# Patient Record
Sex: FEMALE | Race: WHITE | NOT HISPANIC OR LATINO | Employment: FULL TIME | URBAN - NONMETROPOLITAN AREA
[De-identification: names, ages, dates, MRNs, and addresses within clinical notes are randomized per-mention and may not be internally consistent; named-entity substitution may affect disease eponyms.]

---

## 2017-01-10 ENCOUNTER — AMBULATORY - GICH (OUTPATIENT)
Dept: OBGYN | Facility: OTHER | Age: 27
End: 2017-01-10

## 2017-01-10 DIAGNOSIS — Z30.46 ENCOUNTER FOR SURVEILLANCE OF IMPLANTABLE SUBDERMAL CONTRACEPTIVE: ICD-10-CM

## 2017-01-10 DIAGNOSIS — Z30.011 ENCOUNTER FOR INITIAL PRESCRIPTION OF CONTRACEPTIVE PILLS: ICD-10-CM

## 2017-01-10 ASSESSMENT — PATIENT HEALTH QUESTIONNAIRE - PHQ9: SUM OF ALL RESPONSES TO PHQ QUESTIONS 1-9: 4

## 2017-01-23 ENCOUNTER — AMBULATORY - GICH (OUTPATIENT)
Dept: FAMILY MEDICINE | Facility: OTHER | Age: 27
End: 2017-01-23

## 2017-01-30 ENCOUNTER — AMBULATORY - GICH (OUTPATIENT)
Dept: LAB | Facility: OTHER | Age: 27
End: 2017-01-30

## 2017-01-30 DIAGNOSIS — M08.00 JUVENILE RHEUMATOID ARTHRITIS (H): ICD-10-CM

## 2017-01-31 ENCOUNTER — AMBULATORY - GICH (OUTPATIENT)
Dept: LAB | Facility: OTHER | Age: 27
End: 2017-01-31

## 2017-01-31 DIAGNOSIS — M08.00 JUVENILE RHEUMATOID ARTHRITIS (H): ICD-10-CM

## 2017-01-31 LAB
AST SERPL-CCNC: 9 IU/L (ref 13–39)
CREAT SERPL-MCNC: 0.69 MG/DL (ref 0.7–1.3)
ERYTHROCYTE [DISTWIDTH] IN BLOOD BY AUTOMATED COUNT: 11.5 % (ref 11.5–15.5)
GFR IF NOT AFRICAN AMERICAN - HISTORICAL: >60 ML/MIN/1.73M2
HCT VFR BLD AUTO: 36.6 % (ref 33–51)
HEMOGLOBIN: 13 G/DL (ref 12–16)
MCH RBC QN AUTO: 32.2 PG (ref 26–34)
MCHC RBC AUTO-ENTMCNC: 35.4 G/DL (ref 32–36)
MCV RBC AUTO: 91 FL (ref 80–100)
PLATELET # BLD AUTO: 242 THOU/CU MM (ref 140–440)
PMV BLD: 8.1 FL (ref 6.5–11)
RED BLOOD COUNT - HISTORICAL: 4.03 MIL/CU MM (ref 4–5.2)
WHITE BLOOD COUNT - HISTORICAL: 6.2 THOU/CU MM (ref 4.5–11)

## 2017-02-01 ENCOUNTER — OFFICE VISIT - GICH (OUTPATIENT)
Dept: FAMILY MEDICINE | Facility: OTHER | Age: 27
End: 2017-02-01

## 2017-02-01 ENCOUNTER — HISTORY (OUTPATIENT)
Dept: FAMILY MEDICINE | Facility: OTHER | Age: 27
End: 2017-02-01

## 2017-02-01 ENCOUNTER — AMBULATORY - GICH (OUTPATIENT)
Dept: FAMILY MEDICINE | Facility: OTHER | Age: 27
End: 2017-02-01

## 2017-02-01 DIAGNOSIS — F32.9 MAJOR DEPRESSIVE DISORDER, SINGLE EPISODE: ICD-10-CM

## 2017-02-01 DIAGNOSIS — Z00.00 ENCOUNTER FOR GENERAL ADULT MEDICAL EXAMINATION WITHOUT ABNORMAL FINDINGS: ICD-10-CM

## 2017-02-01 DIAGNOSIS — F90.2 ATTENTION-DEFICIT HYPERACTIVITY DISORDER, COMBINED TYPE: ICD-10-CM

## 2017-02-01 DIAGNOSIS — M77.8 OTHER ENTHESOPATHIES, NOT ELSEWHERE CLASSIFIED: ICD-10-CM

## 2017-02-01 DIAGNOSIS — M08.00 JUVENILE RHEUMATOID ARTHRITIS (H): ICD-10-CM

## 2017-02-01 DIAGNOSIS — Z12.4 ENCOUNTER FOR SCREENING FOR MALIGNANT NEOPLASM OF CERVIX: ICD-10-CM

## 2017-02-10 LAB — HPV RESULTS - HISTORICAL: NEGATIVE

## 2017-04-14 ENCOUNTER — HISTORY (OUTPATIENT)
Dept: FAMILY MEDICINE | Facility: OTHER | Age: 27
End: 2017-04-14

## 2017-04-14 ENCOUNTER — OFFICE VISIT - GICH (OUTPATIENT)
Dept: FAMILY MEDICINE | Facility: OTHER | Age: 27
End: 2017-04-14

## 2017-04-14 DIAGNOSIS — J98.8 OTHER SPECIFIED RESPIRATORY DISORDERS: ICD-10-CM

## 2017-04-14 DIAGNOSIS — B97.89 OTHER VIRAL AGENTS AS THE CAUSE OF DISEASES CLASSIFIED ELSEWHERE: ICD-10-CM

## 2017-04-14 DIAGNOSIS — J02.9 ACUTE PHARYNGITIS: ICD-10-CM

## 2017-04-14 LAB — STREP A ANTIGEN - HISTORICAL: NEGATIVE

## 2017-07-25 ENCOUNTER — COMMUNICATION - GICH (OUTPATIENT)
Dept: FAMILY MEDICINE | Facility: OTHER | Age: 27
End: 2017-07-25

## 2017-07-25 DIAGNOSIS — M08.00 JUVENILE RHEUMATOID ARTHRITIS (H): ICD-10-CM

## 2017-11-20 ENCOUNTER — COMMUNICATION - GICH (OUTPATIENT)
Dept: FAMILY MEDICINE | Facility: OTHER | Age: 27
End: 2017-11-20

## 2017-11-20 DIAGNOSIS — Z30.011 ENCOUNTER FOR INITIAL PRESCRIPTION OF CONTRACEPTIVE PILLS: ICD-10-CM

## 2017-12-28 NOTE — TELEPHONE ENCOUNTER
Patient Information     Patient Name MRNikky Mcmanus [id number] Female 1990      Telephone Encounter by Genesis Williamson RN at 2017  8:19 AM     Author:  Genesis Williamson RN Service:  (none) Author Type:  NURS- Registered Nurse     Filed:  2017  8:20 AM Encounter Date:  2017 Status:  Signed     :  Genesis Williamson RN (NURS- Registered Nurse)            Hormones    Office visit in the past 12 months or per provider note.    Last visit with CAMILLA TAVAREZ was on: 2017 in [facility]  Next visit with CAMILLA TAVAREZ is on: No future appointment listed with this provider  Next visit with Family Practice is on: No future appointment listed in this department    Max refill for 12 months from last office visit or per provider note.    Limited refill given as patient is due for annual exam in February.    Prescription refilled per RN Medication Refill Policy.................... Genesis Williamson RN ....................  2017   8:20 AM

## 2017-12-28 NOTE — TELEPHONE ENCOUNTER
Patient Information     Patient Name MRN Nikky Brody [id number] Female 1990      Telephone Encounter by Navya Almeida RN at 2017  2:19 PM     Author:  Navya Almeida RN Service:  (none) Author Type:  NURS- Registered Nurse     Filed:  2017  2:26 PM Encounter Date:  2017 Status:  Signed     :  Navya Almeida RN (NURS- Registered Nurse)            Pt requesting Enbrel.  Called PT to see if she had established with another rheumatologist.  LM for pt to call back.  Navya Almeida RN ....................  2017   2:22 PM

## 2017-12-28 NOTE — TELEPHONE ENCOUNTER
Patient Information     Patient Name MRN Nikky Brody [id number] Female 1990      Telephone Encounter by Navya Almeida RN at 2017  1:49 PM     Author:  Navya Almeida RN Service:  (none) Author Type:  NURS- Registered Nurse     Filed:  2017  1:51 PM Encounter Date:  2017 Status:  Signed     :  Navya Almeida RN (NURS- Registered Nurse)            Spoke with pt who states she established care with  rheumatology.  RX faxed to new provider.    Navya Almeida RN ....................  2017   1:50 PM

## 2018-01-02 NOTE — NURSING NOTE
Patient Information     Patient Name MRN Nikky Brody [id number] Female 1990      Nursing Note by Deirdre Gonzalez at 1/10/2017  8:30 AM     Author:  Deirdre Gonzalez Service:  (none) Author Type:  (none)     Filed:  1/10/2017  8:58 AM Encounter Date:  1/10/2017 Status:  Signed     :  Deirdre Gonzalez            Patient presents today for a nexplanon removal from left arm. Patient states she would like to switch birthcontrol.  Deirdre Gonzalez LPN ....................  1/10/2017   8:22 AM

## 2018-01-03 ENCOUNTER — AMBULATORY - GICH (OUTPATIENT)
Dept: LAB | Facility: OTHER | Age: 28
End: 2018-01-03

## 2018-01-03 DIAGNOSIS — M08.3 SERONEGATIVE JUVENILE RHEUMATOID ARTHRITIS (H): ICD-10-CM

## 2018-01-03 LAB
ALT (SGPT) - HISTORICAL: 11 IU/L (ref 7–52)
AST SERPL-CCNC: 14 IU/L (ref 13–39)
CREAT SERPL-MCNC: 0.82 MG/DL (ref 0.7–1.3)
ERYTHROCYTE [DISTWIDTH] IN BLOOD BY AUTOMATED COUNT: 12 % (ref 11.5–15.5)
ERYTHROCYTE [SEDIMENTATION RATE] IN BLOOD: 5 MM/HR
GFR IF NOT AFRICAN AMERICAN - HISTORICAL: >60 ML/MIN/1.73M2
HCT VFR BLD AUTO: 39.5 % (ref 33–51)
HEMOGLOBIN: 13.5 G/DL (ref 12–16)
MCH RBC QN AUTO: 31.4 PG (ref 26–34)
MCHC RBC AUTO-ENTMCNC: 34.2 G/DL (ref 32–36)
MCV RBC AUTO: 92 FL (ref 80–100)
PLATELET # BLD AUTO: 274 THOU/CU MM (ref 140–440)
PMV BLD: 9.7 FL (ref 6.5–11)
RED BLOOD COUNT - HISTORICAL: 4.3 MIL/CU MM (ref 4–5.2)
WHITE BLOOD COUNT - HISTORICAL: 6.1 THOU/CU MM (ref 4.5–11)

## 2018-01-03 NOTE — ADDENDUM NOTE
Patient Information     Patient Name MRN Nikky Brody [id number] Female 1990      Addendum Note by Ebony Wong at 2017  2:56 PM     Author:  Ebony Wong Service:  (none) Author Type:  (none)     Filed:  2017  2:56 PM Encounter Date:  2017 Status:  Signed     :  Ebony Wong       Addended by: EBONY WONG on: 2017 02:56 PM        Modules accepted: Orders

## 2018-01-03 NOTE — PATIENT INSTRUCTIONS
Patient Information     Patient Name MRN Nikky Brody [id number] Female 1990      Patient Instructions by Cris Friedman MD at 2017 11:41 AM     Author:  Cris Friedman MD  Service:  (none) Author Type:  Physician     Filed:  2017 11:41 AM  Encounter Date:  2017 Status:  Addendum     :  Cris Friedman MD (Physician)        Related Notes: Original Note by Cris Friedman MD (Physician) filed at 2017 11:41 AM               Index Armenian All languages   Attention Deficit/Hyperactivity Disorder (ADHD) in Adults   ________________________________________________________________________  KEY POINTS    Attention deficit/hyperactivity disorder is a condition that causes problems paying attention, being unable to sit still, and doing things without thinking first.    Treatment may include learning coping skills, behavioral training, and medicines.    Exercising, learning ways to relax, yoga, and meditation may also be helpful when used along with medicines and therapy.  ________________________________________________________________________  What is ADHD?   Attention deficit/hyperactivity disorder (ADHD) is a condition that causes problems paying attention (inattentive), trouble sitting still (hyperactive), and doing things without thinking first (impulsive). ADHD is usually diagnosed in childhood, but it can continue into adulthood.  ADHD used to be called attention deficit disorder (ADD).  What is the cause?   The exact cause of this disorder is not known. ADHD seems to run in families. People with this disorder may have physical changes in their brain. These changes may mean that some parts of the brain are more active or less active than in other people.  There may be many other factors involved, such as smoking during pregnancy and low birth weight. There is no evidence that ADHD is caused by sugar or things added to foods such as preservatives and coloring.  Allergies are not a common factor in causing ADHD either.  What are the symptoms?   There are 3 main types of ADHD:     Problems paying attention (inattentive). Symptoms may include:    Being distracted by what is going on around you    Starting many projects but not finishing them    Having trouble learning new tasks or following instructions    Forgetting or losing things    Daydreaming and getting confused easily    Problems sitting still and doing things without thinking first (hyperactive and impulsive). Symptoms may include:    Fidgeting and getting bored very quickly    Acting or reacting to things quickly and without thinking of the outcome    Talking nonstop, interrupting other people who are talking, or speaking without thinking    Being impatient or unable to wait your turn    Being very restless or unable to keep still    Combined type symptoms may include a combination of being inattentive, impulsive, and hyperactive.  Symptoms may change from childhood to adulthood. The most common changes during the teen years are less hyperactivity and better self-control. Being restless and very easily distracted are the most common features of the adult disorder. Adults who had behavior problems in childhood may continue to have problems with aggression. Some adults with ADHD who had learning problems in childhood continue to have problems with reading, writing, or math.  People with ADHD may also have problems with:    Depression or thoughts of suicide    Anxiety    Substance abuse    Impulse control disorders such as gambling addiction or compulsive eating  How is it diagnosed?   ADHD is often, but not always, first diagnosed in childhood.   Your healthcare provider or therapist will ask about your symptoms, medical and family history, and any medicines you are taking. He will make sure you do not have a medical illness or drug or alcohol problem that could cause the symptoms. You may have tests or scans to help  "make a diagnosis.  You and others close to you may be asked to answer questions about ADHD symptoms. You may see a mental health professional for tests to check for problems in addition to ADHD.  How is it treated?   The treatment of ADHD may involve:    Learning coping skills: You will learn to manage situations that distract and over-excite you. You may need more structure and daily routines than other people. You may want to use a day planner or a tablet computer to organize your life.    Behavioral training: Behavior therapy may help you learn how to pay attention for longer periods and set up routines to help.    Medicine: The same medicines used for children are effective for adults. Stimulant medicines appear to increase activity in those areas of the brain so that you can pay attention better and focus on activities. If these medicines are not effective or cause unwanted side effects, there are other medicines that can help with ADHD.  Claims have been made that certain herbal and dietary products help control ADHD symptoms. No herb or dietary supplement has been proven to consistently or completely relieve the symptoms of ADHD. However, omega-3 fatty acids and certain vitamins and minerals may help to reduce some symptoms of ADHD. Supplements are not tested or standardized and may vary in strengths and effects. They may have side effects and are not always safe. Before you take any supplement, talk with your healthcare provider.   Exercising and learning ways to relax may help. Yoga and meditation may also be helpful. You may want to talk with your healthcare provider about using these methods along with medicines and therapy.   Some people with ADHD seem to \"grow out of it\" by their early twenties. However, you may continue to have relationship problems, or trouble keeping up with the things you need to do at home and at work. However, people with ADHD can do well at jobs that reward high energy and " "multi-tasking.  How can I take care of myself?   There are many ways to help manage ADHD:    When you need to read or concentrate, try to stay away from the sounds of television, radio, or others talking. You might try playing low-level background sound such as white noise or instrumental music.    Do tasks in short blocks of time with breaks in between. Use timers or alarms to help you keep on task.    Follow a structured daily routine at home and work. Get help organizing your work, chores, and other activities.    If you have trouble slowing down at bedtime, a planned quiet time before bedtime and background music when falling asleep are often helpful.    Keep a stress ball, worry beads, worry stones, or a doodle pad with you that you can \"fidget with\" to help you stay focused.    Get support. Talk with family and friends. Consider joining a support group in your area.    Learn to manage stress. Ask for help at home and work when the load is too great to handle. Find ways to relax. For example take up a hobby, listen to music, watch movies, or take walks. Try yoga, meditation, or deep breathing exercises when you feel stressed.    Take care of your physical health. Try to get at least 7 to 9 hours of sleep each night. Eat a healthy diet. Limit caffeine. If you smoke, quit. Avoid alcohol and drugs, because they can make your symptoms worse. Exercise according to your healthcare provider's instructions.    Check your medicines. To help prevent problems, tell your healthcare provider and pharmacist about all of the medicines, natural remedies, vitamins, and other supplements that you take. Take all medicines as directed by your provider or therapist. It is very important to take your medicine even when you are feeling and thinking well. Without the medicine, your symptoms may not improve or may get worse. Talk to your provider if you have problems taking your medicine or if the medicines don't seem to be " working.    Contact your healthcare provider or therapist if you have any questions or your symptoms seem to be getting worse.  For more information, contact:    Children and Adults with Attention-Deficit/Hyperactivity Disorder (LEONARDO)   [phone]  http://www.leonardo.org  Developed by [employer].  Adult Advisor 2016.2 published by [employer].  Last modified: 2016-02-17  Last reviewed: 2015-09-29  This content is reviewed periodically and is subject to change as new health information becomes available. The information is intended to inform and educate and is not a replacement for medical evaluation, advice, diagnosis or treatment by a healthcare professional.  References   Adult Advisor 2016.2 Index    Copyright   2016 [employer], a division of [employer] All rights reserved.

## 2018-01-03 NOTE — NURSING NOTE
Patient Information     Patient Name MRN Sex Nikky Catalan [id number] Female 1990      Nursing Note by Deirdre Gonzalez at 1/10/2017  8:30 AM     Author:  Deirdre Gonzalez Service:  (none) Author Type:  (none)     Filed:  1/10/2017  8:58 AM Encounter Date:  1/10/2017 Status:  Signed     :  Deirdre Gonzalez            [place] Protocol    A. Pre-procedure verification complete yes  1-relevant information / documentation available, reviewed and properly matched to the patient; 2-consent accurate and complete, 3-equipment and supplies available    B. Site marking complete N/A  Site marked if not in continuous attendance with patient    C. TIME OUT completed yes  Time Out was conducted just prior to starting procedure to verify the eight required elements: 1-patient identity, 2-consent accurate and complete, 3-position, 4-correct side/site marked (if applicable), 5-procedure, 6-relevant images / results properly labeled and displayed (if applicable), 7-antibiotics / irrigation fluids (if applicable), 8-safety precautions.

## 2018-01-03 NOTE — PROGRESS NOTES
Patient Information     Patient Name MRN Nikky Brody [id number] Female 1990      Progress Notes by Cris Friedman MD at 2017 11:19 AM     Author:  Cris Friedman MD Service:  (none) Author Type:  Physician     Filed:  2017 12:46 PM Encounter Date:  2017 Status:  Signed     :  Cris Friedman MD (Physician)              SUBJECTIVE:    Nikky Singletray is a 26 y.o. female who presents for annual physical and refills of medications. She had been seeing Dr Farmer in [place] and he has retired and wanted her to come here for follow-up visit with me until she could be seen in [place] again. She is on Enbrel and had her labs done yesterday after orders from [facility] were obtained in our lab and were normal. She had some point will be establishing care with a new rheumatologist in [place] but that has not happened yet. She has had JRA since age 3 years and was followed by Rad in childhood.     Would like breast exam today with her visit due to noting a bumpiness on the right medial breast which she thinks may be her ribs but is not sure and has not really been started in self breast exams in the past.     She just had her Nexplanon removed and has started on OCPs and has not yet had a menses on her birth control pills. She was having very irregular bleeding and was not happy with that form of birth control. Denies STD risk and does not want screening today.     Has a mild right elbow tendinitis which seems to come and go. 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 Last year about this time she thinks Dr. Farmer dated injection in that although she's had this done once before. We discussed options of providers that can do a joint injection of the elbow. PT/OT has not been helpful for this in the past.       PROBLEM LIST:  Patient Active Problem List     Diagnosis  Code     ACNE, MILD L70.8     JRA (juvenile rheumatoid arthritis) (HC) M08.00     IBS (irritable bowel  syndrome) K58.9     Attention deficit hyperactivity disorder (ADHD), combined type F90.2     Tendinitis of right elbow M77.8     PAST MEDICAL HISTORY:  Past Medical History     Diagnosis  Date     Chicken pox As a child     JRA (juvenile rheumatoid arthritis) (HC)      SURGICAL HISTORY:  Past Surgical History      Procedure  Laterality Date     Lando teeth extraction         SOCIAL HISTORY:  Social History     Social History        Marital status:  Single     Spouse name: N/A     Number of children:  N/A     Years of education:  N/A     Occupational History      Not on file.     Social History Main Topics          Smoking status:   Passive Smoke Exposure - Never Smoker      Smokeless tobacco:   Former User      Types:  Chew       Comment: Rarely, with driving, roomate smokes       Alcohol use   0.0 oz/week     0 Standard drinks or equivalent per week        Comment: Rare       Drug use:   No      Sexual activity:   Yes      Partners:  Male      Birth control/ protection:  Pill      Other Topics  Concern     Caffeine Concern No     Seat Belt Yes     Social History Narrative     Single.    Attended [employer] [facility] [employer]    Now working at [employer] doing scribe work 2014.     Grew up in [place].    Has a steady boyfriend for about 3 years. He works at [employer].    Has PGM here that she is staying with and she smokes.      FAMILY HISTORY:  Family History       Problem   Relation Age of Onset     Good Health  Mother      Good Health  Father      Cancer-breast  Maternal Grandmother      Other  Maternal Aunt      Kidney stones       Other  Maternal Uncle      Kidney stones       Ankylosing spondylitis  Paternal Grandmother      Glaucoma  Paternal Grandfather       CURRENT MEDICATIONS:   Current Outpatient Prescriptions       Medication  Sig Dispense Refill     buPROPion (WELLBUTRIN SR) 150 mg Sustained-Release tablet TAKE ONE TABLET BY MOUTH EVERY MORNING 90 tablet 0     etanercept (ENBREL  "SURECLICK) 50 mg/mL (0.98 mL) injection Inject 1 mL subcutaneous once weekly. 12 Syringe 1     norgestimate-ethinyl estradiol, 0.25-35 mg-mcg, (ORTHO-CYCLEN) 0.25-35 mg-mcg tablet Take 1 tablet by mouth once daily. 3 Package 3     sulindac (CLINORIL) 200 mg tablet TAKE 1 TAB BY MOUTH TWO TIMES A DAY.  2     No current facility-administered medications for this visit.      Medications have been reviewed by me and are current to the best of my knowledge and ability.    ALLERGIES:  Review of patient's allergies indicates no known allergies.     REVIEW OF SYSTEMS:  General: denies any general problems.  Eyes: denies problems  does regular dental visits.  Ears/Nose/Throat: denies problems  Cardiovascular: denies problems  Respiratory: denies problems  Gastrointestinal: denies problems  Genitourinary: denies problems  Musculoskeletal: See history of present illness  Skin: denies problems  Neurologic: denies problems  Psychiatric: Has had some problems with depression in the past which has resolved. She therefore stopped her Wellbutrin but it was also helping with her ADHD and we discussed going back on the medication for that reason.  Endocrine: denies problems  Heme/Lymphatic: denies problems  Allergic/Immunologic: denies problems  PHQ Depression Screening 2/1/2017   Date of PHQ exam (doc flow) 2/1/2017   1. Lack of interest/pleasure 0 - Not at all   2. Feeling down/depressed 0 - Not at all   PHQ-2 TOTAL SCORE 0   3. Trouble sleeping -   4. Decreased energy -   5. Appetite change -   6. Feelings of failure -   7. Trouble concentrating -   8. Activity level -   9. Hurting yourself -   PHQ-9 TOTAL SCORE -   PHQ-9 Severity Level -   Functional Impairment -      OBJECTIVE:  /70  Pulse 78  Ht 1.64 m (5' 4.57\")  Wt 62.6 kg (138 lb)  LMP 01/01/2017  BMI 23.27 kg/m2  EXAM:   General Appearance: Pleasant, alert, appropriate appearance for age. No acute distress  Head Exam: Normal. Normocephalic, atraumatic.  Eye Exam:  " Normal external eye, conjunctiva, lids, cornea. SHARYN.  Fundoscopic Exam: Normal red reflex and fundoscopic exam.  Ear Exam: Normal TM's bilaterally. Normal auditory canals and external ears. Non-tender.  Nose Exam: Normal external nose, mucus membranes, and septum.  OroPharynx Exam:  Dental hygiene adequate. Normal buccal mucose. Normal pharynx.  Neck Exam:  Supple, no masses or nodes.  Thyroid Exam: No nodules or enlargement.  Chest/Respiratory Exam: Normal chest wall and respirations. Clear to auscultation.  Breast Exam: No dimpling, nipple retraction or discharge. No masses or nodes. Instructed in self breast exams.  Cardiovascular Exam: Regular rate and rhythm. S1, S2, no murmur, click, gallop, or rubs.  Gastrointestinal Exam: Soft, non-tender, no masses or organomegaly.  Rectal Exam: Normal sphincter tone. No masses noted.  Genitourinary Exam Female: External genitalia, vulva and vagina appear normal. Scant white physiologic appearing discharge. Cervix is normal and nulliparous in appearance. Pap was obtained. Bimanual exam reveals normal uterus and adnexa, nontender urethra and bladder.   Lymphatic Exam: Non-palpable nodes in neck, clavicular, axillary, or inguinal regions.  Musculoskeletal Exam: Back is straight and non-tender, full ROM of upper and lower extremities.  Foot Exam: Left and right foot: good pedal pulses, no lesions, nail hygiene good.  Skin: no rash or abnormalities  Neurologic Exam: Nonfocal, symmetric DTRs, normal gross motor, tone coordination and no tremor.  Psychiatric Exam: Alert and oriented - appropriate affect.    ASSESSMENT/PLAN    ICD-10-CM    1. Health maintenance examination Z00.00    2. JRA (juvenile rheumatoid arthritis) (HC) M08.00 etanercept (ENBREL SURECLICK) 50 mg/mL (0.98 mL) injection      sulindac (CLINORIL) 200 mg tablet   3. Depression, unspecified depression type F32.9 buPROPion (WELLBUTRIN SR) 150 mg Sustained-Release tablet      DISCONTINUED: buPROPion (WELLBUTRIN SR)  150 mg Sustained-Release tablet   4. Attention deficit hyperactivity disorder (ADHD), combined type F90.2    5. Tendinitis of right elbow M77.8    6. Pap smear for cervical cancer screening Z12.4 GYN THIN PREP PAP SCREEN IMAGED      GYN THIN PREP PAP SCREEN IMAGED     Ms. Downing Body mass index is 23.27 kg/(m^2). This is within the normal range for a 26 y.o. Normal range for ages 18-64 is between 18.5 and 24.9; normal range for ages 65+ is 23-30.  BP Readings from Last 1 Encounters:02/01/17 : 120/70  Ms. Downing blood pressure is out of the normal range for adults. Per JNC-8 guidelines normal adult blood pressure is < 120/80, pre-hypertensive is between 120/80 and 139/89, and hypertension is 140/90 or greater. Risks of hypertension were discussed. Patient's strategy will be to recheck blood pressure in 12 months.    Plan:  Refill of Wellbutrin given and she is using this for her ADHD as depression has improved.  Consider seeing orthopedic provider at [facility] for injection and right elbow. She also needs to establish care with rheumatology there.  Labs her use of Enbrel would be in 6 months.  STD screening declined.  Influenza vaccine was given at her place of employment.  Reassured regarding breast exam in the area of concern is actually part of her rib.  Follow up annually.  Cris Friedman MD  12:45 PM 2/1/2017     I spent approximately 25 minutes with the patient (exclusive of separately billed services/procedures), with greater than 50% spent in Counseling, Prognosis, Risks and benefits of management or follow-up, Importance of compliance with chosen management options, Instructions of management (treatment) and/or follow-up, Risk factor reduction and Patient education and Coordinating Care, Establishing and/or reviewing the patient's medical record.    Portions of this dictation were created using the Dragon Nuance voice recognition system. Proofreading was completed but there may be errors in  text.

## 2018-01-03 NOTE — PROGRESS NOTES
Patient Information     Patient Name MRN Nikky Brody [id number] Female 1990      Progress Notes by Kendell Leonard MD at 1/10/2017  8:30 AM     Author:  Kendell Leonard MD Service:  (none) Author Type:  Physician     Filed:  1/10/2017  9:04 AM Encounter Date:  1/10/2017 Status:  Signed     :  Kendell Leonard MD (Physician)            Nikky Singletary is a 26 y.o. patient who presents for Nexplanon removal.  Alternative forms of birth control have previously been discussed and she wishes to return to birth control pills..  She has had the Nexplanon in place for over a year and continues to have irregular and unpredictable bleeding.  No questions or concerns today. Consent for Nexplanon removal was obtained.    OBJECTIVE:   Visit Vitals       /70     Pulse 68     Wt 63.2 kg (139 lb 6.4 oz)     Breastfeeding No     BMI 23.38 kg/m2       General Appearance: Normal., Pleasant, alert, appropriate appearance for age. No acute distress  Left arm examined with an removal site selected subdermal inferior to the bicep on the medial surface.  Removal site was marked and sterilized with a chlorhexidine pad.  The site was infiltrated with 5ml of 1% Lidocaine and a Nexplanon capsule was easily removed subcutaneously.  The removal site was closed with Benzoin and steri strips and a bandage.  EBL was < 2 ml and was well tolerated by the patient with no complications.      ASSESSMENT :  1. Breakthrough bleeding on Nexplanon    PLAN: Prescribe Sprintec birth control pills per her request.  She will start these on .  Follow up next annual exam or prn.    Kendell Leonard MD ....................  1/10/2017   8:59 AM

## 2018-01-04 NOTE — PROGRESS NOTES
Patient Information     Patient Name MRN Sex Nikky Catalan [id number] Female 1990      Progress Notes by Robina Srinivasan NP at 2017  2:00 PM     Author:  Robina Srinivasan NP Service:  (none) Author Type:  PHYS- Nurse Practitioner     Filed:  2017  4:24 PM Encounter Date:  2017 Status:  Signed     :  Robina Srinivasan NP (PHYS- Nurse Practitioner)            Nursing Notes:   Barbara Mccray  2017  2:36 PM  Signed  Patient presents with sore throat for 1 week, swollen lymph nodes, dry cough. Patient has had bronchitis before. Patient is lethargic. Patient has a HX of Mono. Patient would like a strep test. Barbara Mccray LPN .............2017  2:17 PM  SUBJECTIVE:    Nikky Singletary is a 26 y.o. female who presents for Sore throat    Pharyngitis    This is a new problem. The current episode started in the past 7 days. The problem has been unchanged. Neither side of throat is experiencing more pain than the other. There has been no fever. The pain is at a severity of 2/10. The pain is mild. Associated symptoms include swollen glands. Pertinent negatives include no congestion, coughing, drooling, ear discharge, ear pain, headaches, hoarse voice, plugged ear sensation, shortness of breath, stridor, trouble swallowing or vomiting. She has had no exposure to strep or mono. She has tried nothing for the symptoms. The treatment provided no relief.       Current Outpatient Prescriptions on File Prior to Visit       Medication  Sig Dispense Refill     buPROPion (WELLBUTRIN SR) 150 mg Sustained-Release tablet Take 1 tablet by mouth every morning. 90 tablet 4     etanercept (ENBREL SURECLICK) 50 mg/mL (0.98 mL) injection Inject 1 mL subcutaneous once weekly. 12 Syringe 1     norgestimate-ethinyl estradiol, 0.25-35 mg-mcg, (ORTHO-CYCLEN) 0.25-35 mg-mcg tablet Take 1 tablet by mouth once daily. 3 Package 3     sulindac (CLINORIL) 200 mg tablet Take 1 tablet by  "mouth 2 times daily with meals. 180 tablet 4     No current facility-administered medications on file prior to visit.        REVIEW OF SYSTEMS:  Review of Systems   HENT: Negative for congestion, drooling, ear discharge, ear pain, hoarse voice and trouble swallowing.    Respiratory: Negative for cough, shortness of breath and stridor.    Gastrointestinal: Negative for vomiting.   Neurological: Negative for headaches.       OBJECTIVE:  /76  Pulse 77  Temp 97.5  F (36.4  C) (Tympanic)  Ht 1.655 m (5' 5.16\")  Wt 59.5 kg (131 lb 3.2 oz)  Breastfeeding? No  BMI 21.73 kg/m2    EXAM:   Physical Exam   Constitutional: She is well-developed, well-nourished, and in no distress.   HENT:   Head: Normocephalic and atraumatic.   Right Ear: Tympanic membrane and ear canal normal.   Left Ear: Tympanic membrane and ear canal normal.   Nose: Rhinorrhea present.   Mouth/Throat: Uvula is midline and mucous membranes are normal. Posterior oropharyngeal erythema present. No oropharyngeal exudate or posterior oropharyngeal edema.   Eyes: Conjunctivae are normal.   Neck: Neck supple.   Cardiovascular: Normal rate, regular rhythm and normal heart sounds.    Pulmonary/Chest: Effort normal and breath sounds normal. No respiratory distress. She has no wheezes. She has no rales.   Skin: Skin is warm and dry. No rash noted.   Nursing note and vitals reviewed.      ASSESSMENT/PLAN:    ICD-10-CM    1. Sore throat J02.9 RAPID STREP WITH REFLEX CULTURE      RAPID STREP WITH REFLEX CULTURE   2. Viral respiratory illness J98.8      B97.89         Plan:  Home cares and OTC gone over. Asked several times for abx. Symptoms likely due to virus. No antibiotic is needed at this time. Symptoms typically worse on days 2-5 and then stabilize and you are sick for days 5-12. Days 12-14 there is slow resolution and if there is a cough, studies show it can linger longer, however one is not as ill as in the beginning. If symptoms begin worsening or fail " to improve after 14 days, return to clinic for reevaluation. All questions were answered and she is in agreement with plan.       YANELY WHEELER NP ....................  4/14/2017   4:24 PM

## 2018-01-04 NOTE — PATIENT INSTRUCTIONS
Patient Information     Patient Name MRNikky Mcmanus [id number] Female 1990      Patient Instructions by Robina Srinivasan NP at 2017  2:00 PM     Author:  Robina Srinivasan NP Service:  (none) Author Type:  PHYS- Nurse Practitioner     Filed:  2017  2:41 PM Encounter Date:  2017 Status:  Signed     :  Robina Srinivasan NP (PHYS- Nurse Practitioner)            Sore Throat   ________________________________________________________________________  KEY POINTS    Sore throat is a common symptom that ranges in severity from just a sense of scratchiness to severe pain.    A sore throat caused by a virus infection usually gets better on its own within 5 to 7 days. If it is caused by bacteria, your healthcare provider may prescribe an antibiotic.    Follow the full course of treatment prescribed by your healthcare provider. Ask your healthcare provider how long it will take to recover, and how to take care of yourself at home.  ________________________________________________________________________  What is a sore throat?  Sore throat is a common symptom that ranges in severity from just a sense of scratchiness to severe pain.  What is the cause?  A sore throat can be caused by bacteria (such as strep) or a virus (such as a cold virus). It can also happen when an infection of the airways, sinuses, or mouth spreads to the throat.  Other causes of sore throat include:    Hay fever    Cigarette smoking or secondhand smoke    Breathing heavily polluted air or chemical fumes    Swallowing sharp foods that hurt the lining of the throat, such as a tortilla chip    Dry air    Heartburn (acid reflux)    Irritation of your throat from vomiting    Fluid draining down the back of your throat (postnasal drip)  What are the symptoms?  Symptoms may include:    A raw feeling in the throat that makes breathing, swallowing, or speaking painful    Redness of the  throat    Fever    Hoarseness    Pain or difficulty swallowing because of swollen tonsils    Pus in your throat    Tender, swollen glands in your neck    Earache (you may feel pain in your ears even though the problem is in your throat)  How is it diagnosed?  Your healthcare provider will ask about your symptoms and medical history and examine you. Your provider may swab your throat to test for strep infection. If your symptoms are more severe, you may need blood tests to check for signs of infection.  How is it treated?  A sore throat caused by a virus infection usually gets better on its own within 5 to 7 days. If your sore throat is caused by bacteria, your healthcare provider may prescribe an antibiotic. You will feel better after you have taken antibiotics for 2 to 3 days. You must take all of your antibiotic even when you are feeling better. If you don't take all of it, your sore throat could come back.  If another health problem is causing the sore throat, such as hay fever, acid reflux, or sinusitis, treatment for that problem will also treat the sore throat.  How can I take care of myself?  Follow the full course of treatment prescribed by your healthcare provider. In addition:    Take nonprescription pain medicine, such as acetaminophen, ibuprofen, or naproxen. Read the label and take as directed. Unless recommended by your healthcare provider, you should not take these medicines for more than 10 days.    Nonsteroidal anti-inflammatory medicines (NSAIDs), such as ibuprofen, naproxen, and aspirin, may cause stomach bleeding and other problems. These risks increase with age.    Acetaminophen may cause liver damage or other problems. Unless recommended by your provider, don't take more than 3000 milligrams (mg) in 24 hours. To make sure you don t take too much, check other medicines you take to see if they also contain acetaminophen. Ask your provider if you need to avoid drinking alcohol while taking this  medicine.    Don t smoke, and stay away from others who are smoking.    Avoid breathing dust and chemical fumes.    Get plenty of rest.    You may want to rest your throat by talking less and eating a diet that is mostly liquid or soft for a day or two. Avoid salty or spicy foods and citrus fruits. Drink extra fluids, such as water, fruit juice, and tea.    Use a humidifier to put more moisture in the air. Avoid steam vaporizers because they can cause burns. Be sure to keep the humidifier clean, as recommended in the 's instructions. It's important to keep bacteria and mold from growing in the water container.    Cough drops may help relieve the soreness.    Gargling with warm saltwater may help. (You can make a saltwater solution by adding 1/4 teaspoon of salt to 8 ounces, or 240 mL, of warm water.)  If a sore throat lasts for more than a few days, call your healthcare provider. Serious causes of sore throat include strep throat and mononucleosis (mono). It is important to know if one of these is causing your sore throat.  Ask your healthcare provider:    How and when you will get your test results    How long it will take to recover    If there are activities you should avoid and when you can return to your normal activities    How to take care of yourself at home    What symptoms or problems you should watch for and what to do if you have them  Make sure you know when you should come back for a checkup. Keep all appointments for provider visits or tests.  How can I prevent a sore throat?  The following suggestions may help prevent a sore throat:    Wash your hands often and especially after using the restroom, coughing, sneezing, or blowing your nose. Also wash your hands before eating or touching your eyes.    Stay at least 6 feet away from people who are sick, if you can.    Stay indoors as much as possible on high-pollution days.    If you have frequent heartburn or reflux disease, see your  healthcare provider about preventing or treating these problems.    Take care of your health. Try to get at least 7 to 9 hours of sleep each night. Eat a healthy diet and try to keep a healthy weight. If you smoke, try to quit. If you want to drink alcohol, ask your healthcare provider how much is safe for you to drink. Learn ways to manage stress. Exercise according to your healthcare provider's instructions.

## 2018-01-26 VITALS
DIASTOLIC BLOOD PRESSURE: 76 MMHG | BODY MASS INDEX: 21.86 KG/M2 | SYSTOLIC BLOOD PRESSURE: 104 MMHG | TEMPERATURE: 97.5 F | HEIGHT: 65 IN | WEIGHT: 131.2 LBS | HEART RATE: 77 BPM

## 2018-01-26 VITALS
HEART RATE: 78 BPM | SYSTOLIC BLOOD PRESSURE: 120 MMHG | DIASTOLIC BLOOD PRESSURE: 70 MMHG | BODY MASS INDEX: 22.99 KG/M2 | HEIGHT: 65 IN | WEIGHT: 138 LBS

## 2018-01-27 VITALS
SYSTOLIC BLOOD PRESSURE: 112 MMHG | HEART RATE: 68 BPM | BODY MASS INDEX: 23.38 KG/M2 | WEIGHT: 139.4 LBS | DIASTOLIC BLOOD PRESSURE: 70 MMHG

## 2018-02-02 ENCOUNTER — DOCUMENTATION ONLY (OUTPATIENT)
Dept: FAMILY MEDICINE | Facility: OTHER | Age: 28
End: 2018-02-02

## 2018-02-02 PROBLEM — M77.8 TENDINITIS OF RIGHT ELBOW: Status: ACTIVE | Noted: 2017-02-01

## 2018-02-02 PROBLEM — F90.2 ATTENTION DEFICIT HYPERACTIVITY DISORDER (ADHD), COMBINED TYPE: Status: ACTIVE | Noted: 2017-02-01

## 2018-02-02 RX ORDER — SULINDAC 200 MG/1
200 TABLET ORAL 2 TIMES DAILY WITH MEALS
COMMUNITY
Start: 2017-02-01 | End: 2018-02-20

## 2018-02-02 RX ORDER — NORGESTIMATE AND ETHINYL ESTRADIOL 0.25-0.035
1 KIT ORAL DAILY
COMMUNITY
Start: 2017-11-20 | End: 2018-02-12

## 2018-02-02 RX ORDER — BUPROPION HYDROCHLORIDE 150 MG/1
150 TABLET, EXTENDED RELEASE ORAL EVERY MORNING
COMMUNITY
Start: 2017-02-01 | End: 2018-02-20

## 2018-02-04 ENCOUNTER — HEALTH MAINTENANCE LETTER (OUTPATIENT)
Age: 28
End: 2018-02-04

## 2018-02-04 ASSESSMENT — PATIENT HEALTH QUESTIONNAIRE - PHQ9: SUM OF ALL RESPONSES TO PHQ QUESTIONS 1-9: 4

## 2018-02-12 DIAGNOSIS — Z30.011 ENCOUNTER FOR INITIAL PRESCRIPTION OF CONTRACEPTIVE PILLS: Primary | ICD-10-CM

## 2018-02-15 RX ORDER — NORGESTIMATE AND ETHINYL ESTRADIOL 0.25-0.035
KIT ORAL
Qty: 28 TABLET | Refills: 0 | Status: SHIPPED | OUTPATIENT
Start: 2018-02-15 | End: 2018-02-20

## 2018-02-15 NOTE — TELEPHONE ENCOUNTER
"Prescription approved per [facility] Refill Protocol.  Last refill:  11/20/2017 stated: \"Limited refill given as patient is due for annual exam in February.\"    Upcoming appointment noted:  2/20/2018 with PCP.    Limited refill given to get patient through to appointment.    Isabelle Gutierres RN  ....................  2/15/2018   12:40 PM      "

## 2018-02-19 ENCOUNTER — DOCUMENTATION ONLY (OUTPATIENT)
Dept: FAMILY MEDICINE | Facility: OTHER | Age: 28
End: 2018-02-19

## 2018-02-20 ENCOUNTER — OFFICE VISIT (OUTPATIENT)
Dept: FAMILY MEDICINE | Facility: OTHER | Age: 28
End: 2018-02-20
Attending: FAMILY MEDICINE
Payer: COMMERCIAL

## 2018-02-20 VITALS
WEIGHT: 141.4 LBS | HEIGHT: 65 IN | BODY MASS INDEX: 23.56 KG/M2 | HEART RATE: 84 BPM | SYSTOLIC BLOOD PRESSURE: 118 MMHG | DIASTOLIC BLOOD PRESSURE: 82 MMHG

## 2018-02-20 DIAGNOSIS — M08.00 JRA (JUVENILE RHEUMATOID ARTHRITIS) (H): ICD-10-CM

## 2018-02-20 DIAGNOSIS — Z30.011 ENCOUNTER FOR INITIAL PRESCRIPTION OF CONTRACEPTIVE PILLS: ICD-10-CM

## 2018-02-20 DIAGNOSIS — Z00.00 ENCOUNTER FOR HEALTH MAINTENANCE EXAMINATION: Primary | ICD-10-CM

## 2018-02-20 DIAGNOSIS — F90.2 ATTENTION DEFICIT HYPERACTIVITY DISORDER (ADHD), COMBINED TYPE: ICD-10-CM

## 2018-02-20 PROBLEM — R76.8 ANA POSITIVE: Status: ACTIVE | Noted: 2017-07-11

## 2018-02-20 PROBLEM — Z79.899 HIGH RISK MEDICATION USE: Status: ACTIVE | Noted: 2017-07-11

## 2018-02-20 PROCEDURE — 99395 PREV VISIT EST AGE 18-39: CPT | Performed by: FAMILY MEDICINE

## 2018-02-20 RX ORDER — BUPROPION HYDROCHLORIDE 150 MG/1
150 TABLET, EXTENDED RELEASE ORAL EVERY MORNING
Qty: 90 TABLET | Refills: 3 | Status: SHIPPED | OUTPATIENT
Start: 2018-02-20 | End: 2019-08-15

## 2018-02-20 RX ORDER — SULINDAC 200 MG/1
200 TABLET ORAL 2 TIMES DAILY WITH MEALS
Qty: 180 TABLET | Refills: 3 | Status: SHIPPED | OUTPATIENT
Start: 2018-02-20 | End: 2019-12-10

## 2018-02-20 RX ORDER — NORGESTIMATE AND ETHINYL ESTRADIOL 0.25-0.035
KIT ORAL
Qty: 84 TABLET | Refills: 3 | Status: SHIPPED | OUTPATIENT
Start: 2018-02-20 | End: 2019-01-18

## 2018-02-20 NOTE — PROGRESS NOTES
Annual exam  Nikky Singletary 27 year old G 0 P0  presents for annual exam.  Current complaints are need for refills.  She sees a rheumatologist yearly and they keep changing the provider that she sees at [facility].  She is requesting that I follow her for her Enbrel refills in the future if she remains on a stable dose since she has been very stable for about 10 years.  She has no current active synovitis.  She does have some overuse symptoms in her right wrist from her current job situation but is able to tolerate it.  Pap testing is up-to-date and does not require recheck today.  She would like refill of her OCPs.  She did have a new sexual partner within the last year but denies need for STD screening.    HPI:  Pt is not new to clinic  Body mass index is 23.53 kg/(m^2).  Wt Readings from Last 3 Encounters:   02/20/18 141 lb 6.4 oz (64.1 kg)   04/14/17 131 lb 3.2 oz (59.5 kg)   02/01/17 138 lb (62.6 kg)       Reviewed preventative issues as listed by clinical assistant  Exercises: 3 per week  Seat belt 100 % use,   Texting while driving:Discouraged    Alcohol use Yes 0.0 oz/week   Comment: Alcoholic Drinks/day: occasional     Allergies: Review of patient's allergies indicates no known allergies.  Meds:   Current Outpatient Prescriptions   Medication Sig Dispense Refill     norgestimate-ethinyl estradiol (SPRINTEC 28) 0.25-35 MG-MCG per tablet TAKE 1 TABLET BY MOUTH ONCE DAILY. 84 tablet 3     buPROPion (WELLBUTRIN SR) 150 MG 12 hr tablet Take 1 tablet (150 mg) by mouth every morning 90 tablet 3     sulindac (CLINORIL) 200 MG tablet Take 1 tablet (200 mg) by mouth 2 times daily (with meals) 180 tablet 3     Etanercept (ENBREL SURECLICK) 50 MG/ML autoinjector Inject 50 mg Subcutaneous       Etanercept (ENBREL SURECLICK) 50 MG/ML autoinjector Inject 50 mg Subcutaneous once a week       [DISCONTINUED] SPRINTEC 28 0.25-35 MG-MCG per tablet TAKE 1 TABLET BY MOUTH ONCE DAILY. 28 tablet 0     [DISCONTINUED]  "buPROPion (WELLBUTRIN SR) 150 MG 12 hr tablet Take 150 mg by mouth every morning       [DISCONTINUED] sulindac (CLINORIL) 200 MG tablet Take 200 mg by mouth 2 times daily (with meals)           Review of systems is negative except as listed in the HPI       Physical Exam   /82 (BP Location: Right arm, Patient Position: Sitting, Cuff Size: Adult Regular)  Pulse 84  Ht 5' 5\" (1.651 m)  Wt 141 lb 6.4 oz (64.1 kg)  LMP 01/20/2018 (Approximate)  BMI 23.53 kg/m2  Wt Readings from Last 3 Encounters:   02/20/18 141 lb 6.4 oz (64.1 kg)   04/14/17 131 lb 3.2 oz (59.5 kg)   02/01/17 138 lb (62.6 kg)       Appearance: well developed, well nourished, no acute distress  Head Exam normocephalic, no lesionsor deformities  Thyroid: no nodules, masses, tenderness, or enlargement  Breast exam: no masses or nipple discharge  Lungs: no rales, rhonchi, or wheezes  Heart: S1, S2, no murmur, rub, or gallop  Abdomen:soft, non-tender, no masses, no organomegaly, no hernia  Skin: no ulcers or lesions, no abnormal nevi  Lymph: no cervical, axillary, or inguinal adenopathy  Extremities: no edema  Psych: orientated x3  Impression:   1. Encounter for health maintenance examination    2. Encounter for initial prescription of contraceptive pills    3. Attention deficit hyperactivity disorder (ADHD), combined type    4. JRA (juvenile rheumatoid arthritis) (H)        Assessment/Plan:  1. Encounter for health maintenance examination    2. Encounter for initial prescription of contraceptive pills    3. Attention deficit hyperactivity disorder (ADHD), combined type    4. JRA (juvenile rheumatoid arthritis) (H)        Plan:   Refills completed.  Recent labs reviewed and these were for rheumatology and they did refill her Enbrel according to the [facility] notes.  She will be seeing a provider there in July.  Cris Friedman MD  9:07 AM 2/20/2018   Portions of this dictation were created using the Dragon Nuance voice recognition system. " Proofreading was completed but there may be errors in text.

## 2018-02-20 NOTE — MR AVS SNAPSHOT
"              After Visit Summary   2/20/2018    Nikky Singletary    MRN: [id number]           Patient Information     Date Of Birth          1990        Visit Information        Provider Department      2/20/2018 8:00 AM Cris Friedman MD [facility]        Today's Diagnoses     Attention deficit hyperactivity disorder (ADHD), combined type    -  1    Encounter for initial prescription of contraceptive pills        JRA (juvenile rheumatoid arthritis) (H)           Follow-ups after your visit        Who to contact     If you have questions or need follow up information about today's clinic visit or your schedule please contact [facility] directly at [phone].  Normal or non-critical lab and imaging results will be communicated to you by [employer], letter or phone within 4 business days after the clinic has received the results. If you do not hear from us within 7 days, please contact the clinic through [employer] or phone. If you have a critical or abnormal lab result, we will notify you by phone as soon as possible.  Submit refill requests through [employer] or call your pharmacy and they will forward the refill request to us. Please allow 3 business days for your refill to be completed.          Additional Information About Your Visit        MyChart Information     [employer] gives you secure access to your electronic health record. If you see a primary care provider, you can also send messages to your care team and make appointments. If you have questions, please call your primary care clinic.  If you do not have a primary care provider, please call [phone] and they will assist you.        Care EveryWhere ID     This is your Care EveryWhere ID. This could be used by other organizations to access your [place] medical records  [id number]        Your Vitals Were     Pulse Height Last Period BMI (Body Mass Index)          84 1.651 m (5' 5\") 01/20/2018 " (Approximate) 23.53 kg/m2         Blood Pressure from Last 3 Encounters:   02/20/18 118/82   04/14/17 104/76   02/01/17 120/70    Weight from Last 3 Encounters:   02/20/18 64.1 kg (141 lb 6.4 oz)   04/14/17 59.5 kg (131 lb 3.2 oz)   02/01/17 62.6 kg (138 lb)              Today, you had the following     No orders found for display         Today's Medication Changes          These changes are accurate as of 2/20/18  8:39 AM.  If you have any questions, ask your nurse or doctor.               These medicines have changed or have updated prescriptions.        Dose/Directions    norgestimate-ethinyl estradiol 0.25-35 MG-MCG per tablet   Commonly known as:  SPRINTEC 28   This may have changed:  See the new instructions.   Used for:  Encounter for initial prescription of contraceptive pills   Changed by:  Cris Friedman MD        TAKE 1 TABLET BY MOUTH ONCE DAILY.   Quantity:  84 tablet   Refills:  3            Where to get your medicines      These medications were sent to [id number] - [place], [place] - [address]  [address], [facility] [address]     Phone:  [phone]     buPROPion 150 MG 12 hr tablet    norgestimate-ethinyl estradiol 0.25-35 MG-MCG per tablet    sulindac 200 MG tablet                Primary Care Provider Office Phone # Fax #    Cris Friedman [phone]       [address] [facility] [address]        Equal Access to Services     [facility]: Hadii tatiana ugarteo Soslade, waaxda luqadaha, qaybta kaalmada adeegyada, sun loomis. So [facility] [phone].    ATENCIÓN: Si habla español, tiene a hightower disposición servicios gratuitos de asistencia lingüística. Llame al [phone].    We comply with applicable federal civil rights laws and [place] laws. We do not discriminate on the basis of race, color, national origin, age, disability, sex, sexual orientation, or gender identity.             Thank you!     Thank you for choosing [facility] AND [facility]  for your care. Our goal is always to provide you with excellent care. Hearing back from our patients is one way we can continue to improve our services. Please take a few minutes to complete the written survey that you may receive in the mail after your visit with us. Thank you!             Your Updated Medication List - Protect others around you: Learn how to safely use, store and throw away your medicines at www.disposemymeds.org.          This list is accurate as of 2/20/18  8:39 AM.  Always use your most recent med list.                   Brand Name Dispense Instructions for use Diagnosis    buPROPion 150 MG 12 hr tablet    WELLBUTRIN SR    90 tablet    Take 1 tablet (150 mg) by mouth every morning    Attention deficit hyperactivity disorder (ADHD), combined type       * Etanercept 50 MG/ML autoinjector    ENBREL SURECLICK     Inject 50 mg Subcutaneous once a week        * Etanercept 50 MG/ML autoinjector    ENBREL SURECLICK     Inject 50 mg Subcutaneous        norgestimate-ethinyl estradiol 0.25-35 MG-MCG per tablet    SPRINTEC 28    84 tablet    TAKE 1 TABLET BY MOUTH ONCE DAILY.    Encounter for initial prescription of contraceptive pills       sulindac 200 MG tablet    CLINORIL    180 tablet    Take 1 tablet (200 mg) by mouth 2 times daily (with meals)    JRA (juvenile rheumatoid arthritis) (H)       * Notice:  This list has 2 medication(s) that are the same as other medications prescribed for you. Read the directions carefully, and ask your doctor or other care provider to review them with you.

## 2018-02-20 NOTE — NURSING NOTE
Patient presents to clinic for yearly medication refills.  Marion Osuna LPN ...... 2/20/2018 8:12 AM

## 2018-03-16 DIAGNOSIS — F90.2 ATTENTION DEFICIT HYPERACTIVITY DISORDER (ADHD), COMBINED TYPE: ICD-10-CM

## 2018-03-21 RX ORDER — BUPROPION HYDROCHLORIDE 150 MG/1
TABLET, EXTENDED RELEASE ORAL
Qty: 90 TABLET | Refills: 3 | OUTPATIENT
Start: 2018-03-21

## 2018-03-21 NOTE — TELEPHONE ENCOUNTER
Refill request inappropriate. Filled 2/20/2018 #90 x 3 to [facility]. [facility] alerted. Unable to complete prescription refill per RNMedication Refill Policy.................... Luanne Antoine ....................  3/21/2018   3:30 PM      buPROPion (WELLBUTRIN SR) 150 MG 12 hr tablet 90 tablet 3 2/20/2018  No   Sig: Take 1 tablet (150 mg) by mouth every morning   Class: E-Prescribe   Route: Oral   Order: [id number]   E-Prescribing Status: Receipt confirmed by pharmacy (2/20/2018  8:30 AM CST)

## 2018-06-06 DIAGNOSIS — M25.531 RIGHT WRIST PAIN: Primary | ICD-10-CM

## 2018-06-12 ENCOUNTER — HOSPITAL ENCOUNTER (OUTPATIENT)
Dept: GENERAL RADIOLOGY | Facility: OTHER | Age: 28
Discharge: HOME | End: 2018-06-12
Attending: ORTHOPAEDIC SURGERY | Admitting: ORTHOPAEDIC SURGERY
Payer: OTHER MISCELLANEOUS

## 2018-06-12 ENCOUNTER — OFFICE VISIT (OUTPATIENT)
Dept: ORTHOPEDICS | Facility: OTHER | Age: 28
End: 2018-06-12
Attending: ORTHOPAEDIC SURGERY
Payer: OTHER MISCELLANEOUS

## 2018-06-12 VITALS
SYSTOLIC BLOOD PRESSURE: 120 MMHG | HEIGHT: 65 IN | BODY MASS INDEX: 23.49 KG/M2 | DIASTOLIC BLOOD PRESSURE: 74 MMHG | WEIGHT: 141 LBS | HEART RATE: 88 BPM

## 2018-06-12 DIAGNOSIS — M25.531 RIGHT WRIST PAIN: ICD-10-CM

## 2018-06-12 DIAGNOSIS — M77.8 TENDINITIS OF EXTENSOR TENDON OF RIGHT HAND: Primary | ICD-10-CM

## 2018-06-12 PROCEDURE — 99203 OFFICE O/P NEW LOW 30 MIN: CPT | Performed by: ORTHOPAEDIC SURGERY

## 2018-06-12 PROCEDURE — 73110 X-RAY EXAM OF WRIST: CPT | Mod: RT

## 2018-06-12 ASSESSMENT — PAIN SCALES - GENERAL: PAINLEVEL: MODERATE PAIN (5)

## 2018-06-12 NOTE — PROGRESS NOTES
Nikky Singletary was seen in consultation for a chief complaint of pain about the right wrist and right hand ulnar aspect..    CHIEF COMPLAINT: iNkky Singletary is a 27 year old  female  Chief Complaint   Patient presents with     Work Comp     Right wrist doi- 3/1/18       HISTORY OF PRESENTING INJURY   History of presenting injury, patient works as a scribe at [employer] and does significant amount of typing and writing she is notes increased pain along her right wrist ulnar aspect.  She does suffer with rheumatoid arthritis as well.  She is noticed since approximately 3/1/2018 there is been more bothersome she has been able to do her job and she states she does work through it even always been uncomfortable.  She previously had tried splints over a year ago with minimal relief.  She has had no therapy.  She is on anti-inflammatories for the rheumatoid arthritis..  Description of pain: mild.  Radiation of pain: Yes.  Pain course: stable.  Worse with: Increased activity especially hand motions.  Improved by: Rest.  History of injection: no.  Any PT: no.      PAST MEDICAL HISTORY:  Past Medical History:   Diagnosis Date     Acne, mild 11/18/2010     HEATH positive 7/11/2017     Attention deficit hyperactivity disorder (ADHD), combined type 2/1/2017    Overview:  Diagnosed in elementary age. Used some medications but cannot recall what at that time.      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 Followed at [facility] as a child.  Sees Dr Farmer twice a year     IBS (irritable bowel syndrome) 6/10/2015     Juvenile rheumatoid arthritis (H) 06/09/2014    Diagnosed at age 3 years, followed by [facility] as a child.  Follows Dr Farmer twice a year.     Myopia 3/23/2005     Tendinitis of extensor tendon of right hand 6/12/2018     Tendinitis of right elbow 2/1/2017     Varicella without complication     As a child       PAST  "SURGICAL HISTORY:  Past Surgical History:   Procedure Laterality Date     EXTRACTION(S) DENTAL         ALLERGIES:  No Known Allergies    CURRENT MEDICATIONS:  Current Outpatient Prescriptions   Medication Sig Dispense Refill     buPROPion (WELLBUTRIN SR) 150 MG 12 hr tablet Take 1 tablet (150 mg) by mouth every morning 90 tablet 3     Etanercept (ENBREL SURECLICK) 50 MG/ML autoinjector Inject 50 mg Subcutaneous       norgestimate-ethinyl estradiol (SPRINTEC 28) 0.25-35 MG-MCG per tablet TAKE 1 TABLET BY MOUTH ONCE DAILY. 84 tablet 3     sulindac (CLINORIL) 200 MG tablet Take 1 tablet (200 mg) by mouth 2 times daily (with meals) 180 tablet 3     [DISCONTINUED] Etanercept (ENBREL SURECLICK) 50 MG/ML autoinjector Inject 50 mg Subcutaneous once a week         SOCIAL HISTORY:  Marital Status: single (never ).  Children: no.  Occupation: Works as a [employer].  Alcohol use:Occassional.  Tobacco use: Smoker: no.  Are you or have you used illicit drugs:  no.    FAMILY HISTORY:  Family History   Problem Relation Age of Onset     Family History Negative Mother      Good Health     Family History Negative Father      Good Health     Breast Cancer Maternal Grandmother      Cancer-breast     Other - See Comments Maternal Aunt      Kidney stones     Other - See Comments Maternal Uncle      Kidney stones     Ankylosing Spondylitis Paternal Grandmother      Ankylosing spondylitis     Glaucoma Paternal Grandfather      Glaucoma       REVIEW OF SYSTEMS:  The review of systems as documented in the HPI and on the intake questionnaire, completed by the patient on 6/12/2018 have been reviewed by myself and the pertinentpositives and negatives addressed.  The remainder of the complete review of systems was non-contributory.    PHYSICAL EXAM:   /74  Pulse 88  Ht 1.651 m (5' 5\")  Wt 64 kg (141 lb)  BMI 23.46 kg/m2 Body mass index is 23.46 kg/(m^2).    General Appearance: Pleasant 27 year old female in good appearance, mood and " affect.  Alert and orientated times three ( time, date and location).    Skin: Intact no breakdown noted.    Wrist:  Motion: Full motion.  Tenderness at the scaphoid: negative.  Tenderness: Tenderness along the ulnar aspect of her right wrist that flares up with motions that have increased tension placed onto ulnar deviation.    Hand:  Thenar wasting: Negative.  Hypothenar wasting: Negative.  Sensation: Intact.  Radial and ulnar blood flow: Good blood flow.  Tinel's test negative.  Phalen's test negative.  Compression test negative.    Elbow:  Motion: Full motion.    Shoulder:  Motion: Full motion.    Eyes: Pupils are round.    Ears: Hearing: Intact.    Heart: Good capillary refill and her hands pulses are regular.    Lungs: Clear.    Radiographic images from 6/12/2018 where independently reviewed and discussed with the patient.      Xray:    X-rays demonstrate maintained joint spaces there is no fractures or osteophytic changes noted.    PROCEDURE: XR WRIST RT G/E 3 VW    HISTORY: ; Right wrist pain    COMPARISON: MRI 1/5/2016    TECHNIQUE: 4 views of the right wrist were obtained.    FINDINGS: No fracture or dislocation is identified. The joint spaces  are preserved.     IMPRESSION: No acute fracture.     SILVER TOBIN MD      IMPRESSION:    Right extensor carpi ulnaris tendinitis.  History of rheumatoid arthritis.    PLAN:  Risks, benefits, conservative, surgical and alternatives to treatment where discussed and the patient would like to proceed with conservative measures.  She should work with occupational therapy order was placed.  I would avoid splint use at present time.  I would encourage a change in her job this is more related to her rheumatoid arthritis and this does cause increased synovial reaction therefore something with less repetition would be better in her lifetime she understands this.  Medications through her primary and rheumatologist.  No further orthopedic surgical intervention is  needed.  She has had a previous EMG done she believes last year they came back negative per her words.    Gianni Mosley D.O.  Orthopaedic Surgeon    [facility]  [address] [place] [address]  Phone [phone] (KNEE)  Fax [phone]    Disclaimer:  This note consists of words and symbols derived from keyboarding, dictation, or using voice recognition software. As a result, there may be errors in the script that have gone undetected. Please consider this when interpreting information found in this note.    1:10 PM 6/12/2018

## 2018-06-12 NOTE — MR AVS SNAPSHOT
After Visit Summary   6/12/2018    Nikky Singletary    MRN: [id number]           Patient Information     Date Of Birth          1990        Visit Information        Provider Department      6/12/2018 12:45 PM Gianni Mosley DO [facility]        Today's Diagnoses     Tendinitis of extensor tendon of right hand    -  1       Follow-ups after your visit        Additional Services     OCCUPATIONAL THERAPY REFERRAL       Would like to see OT at [facility]                  Follow-up notes from your care team     Return if symptoms worsen or fail to improve.      Who to contact     If you have questions or need follow up information about today's clinic visit or your schedule please contact [facility] directly at [phone].  Normal or non-critical lab and imaging results will be communicated to you by Affaredelgiornohart, letter or phone within 4 business days after the clinic has received the results. If you do not hear from us within 7 days, please contact the clinic through Affaredelgiornohart or phone. If you have a critical or abnormal lab result, we will notify you by phone as soon as possible.  Submit refill requests through [employer] or call your pharmacy and they will forward the refill request to us. Please allow 3 business days for your refill to be completed.          Additional Information About Your Visit        MyChart Information     [employer] gives you secure access to your electronic health record. If you see a primary care provider, you can also send messages to your care team and make appointments. If you have questions, please call your primary care clinic.  If you do not have a primary care provider, please call [phone] and they will assist you.        Care EveryWhere ID     This is your Care EveryWhere ID. This could be used by other organizations to access your [place] medical records  [id number]        Your Vitals Were     Pulse Height BMI (Body Mass  "Index)             88 1.651 m (5' 5\") 23.46 kg/m2          Blood Pressure from Last 3 Encounters:   06/12/18 120/74   02/20/18 118/82   04/14/17 104/76    Weight from Last 3 Encounters:   06/12/18 64 kg (141 lb)   02/20/18 64.1 kg (141 lb 6.4 oz)   04/14/17 59.5 kg (131 lb 3.2 oz)              We Performed the Following     OCCUPATIONAL THERAPY REFERRAL        Primary Care Provider Office Phone # Fax #    Cris Betty Friedman [phone] [address] [facility] [address]        Equal Access to Services     CAROLYN BALL : Hadii tatiana Ricci, wachintan zarate, carlos kaalmada sekou, sun souza . So [facility] [phone].    ATENCIÓN: Si habla español, tiene a hightower disposición servicios gratuitos de asistencia lingüística. Llame al [phone].    We comply with applicable federal civil rights laws and [place] laws. We do not discriminate on the basis of race, color, national origin, age, disability, sex, sexual orientation, or gender identity.            Thank you!     Thank you for choosing [facility] [place] [facility]  for your care. Our goal is always to provide you with excellent care. Hearing back from our patients is one way we can continue to improve our services. Please take a few minutes to complete the written survey that you may receive in the mail after your visit with us. Thank you!             Your Updated Medication List - Protect others around you: Learn how to safely use, store and throw away your medicines at www.disposemymeds.org.          This list is accurate as of 6/12/18  1:15 PM.  Always use your most recent med list.                   Brand Name Dispense Instructions for use Diagnosis    buPROPion 150 MG 12 hr tablet    WELLBUTRIN SR    90 tablet    Take 1 tablet (150 mg) by mouth every morning    Attention deficit hyperactivity disorder (ADHD), combined type       Etanercept 50 MG/ML autoinjector    ENBREL SURECLICK "     Inject 50 mg Subcutaneous        norgestimate-ethinyl estradiol 0.25-35 MG-MCG per tablet    SPRINTEC 28    84 tablet    TAKE 1 TABLET BY MOUTH ONCE DAILY.    Encounter for initial prescription of contraceptive pills       sulindac 200 MG tablet    CLINORIL    180 tablet    Take 1 tablet (200 mg) by mouth 2 times daily (with meals)    JRA (juvenile rheumatoid arthritis) (H)

## 2018-06-26 ENCOUNTER — TRANSFERRED RECORDS (OUTPATIENT)
Dept: HEALTH INFORMATION MANAGEMENT | Facility: OTHER | Age: 28
End: 2018-06-26

## 2018-07-10 ENCOUNTER — TRANSFERRED RECORDS (OUTPATIENT)
Dept: HEALTH INFORMATION MANAGEMENT | Facility: OTHER | Age: 28
End: 2018-07-10

## 2018-07-22 ENCOUNTER — OFFICE VISIT (OUTPATIENT)
Dept: FAMILY MEDICINE | Facility: OTHER | Age: 28
End: 2018-07-22
Attending: NURSE PRACTITIONER
Payer: COMMERCIAL

## 2018-07-22 ENCOUNTER — HOSPITAL ENCOUNTER (OUTPATIENT)
Dept: GENERAL RADIOLOGY | Facility: OTHER | Age: 28
Discharge: HOME | End: 2018-07-22
Attending: NURSE PRACTITIONER | Admitting: NURSE PRACTITIONER
Payer: COMMERCIAL

## 2018-07-22 VITALS
BODY MASS INDEX: 23.04 KG/M2 | HEART RATE: 80 BPM | HEIGHT: 65 IN | DIASTOLIC BLOOD PRESSURE: 80 MMHG | WEIGHT: 138.3 LBS | SYSTOLIC BLOOD PRESSURE: 116 MMHG | TEMPERATURE: 97.3 F

## 2018-07-22 DIAGNOSIS — S99.922A FOOT INJURY, LEFT, INITIAL ENCOUNTER: ICD-10-CM

## 2018-07-22 DIAGNOSIS — M25.475 SWELLING OF FOOT JOINT, LEFT: ICD-10-CM

## 2018-07-22 DIAGNOSIS — S92.354A CLOSED NONDISPLACED FRACTURE OF FIFTH METATARSAL BONE OF RIGHT FOOT, INITIAL ENCOUNTER: Primary | ICD-10-CM

## 2018-07-22 PROCEDURE — 99214 OFFICE O/P EST MOD 30 MIN: CPT | Performed by: NURSE PRACTITIONER

## 2018-07-22 PROCEDURE — 73630 X-RAY EXAM OF FOOT: CPT | Mod: LT

## 2018-07-22 RX ORDER — TRAMADOL HYDROCHLORIDE 50 MG/1
50 TABLET ORAL EVERY 6 HOURS PRN
Qty: 20 TABLET | Refills: 0 | Status: SHIPPED | OUTPATIENT
Start: 2018-07-22 | End: 2018-09-05

## 2018-07-22 ASSESSMENT — PAIN SCALES - GENERAL: PAINLEVEL: MODERATE PAIN (4)

## 2018-07-22 NOTE — MR AVS SNAPSHOT
After Visit Summary   7/22/2018    Nikky Singletary    MRN: [id number]           Patient Information     Date Of Birth          1990        Visit Information        Provider Department      7/22/2018 1:30 PM Rin Philippe NP [facility]        Today's Diagnoses     Closed displaced fracture of fifth metatarsal bone of left foot, initial encounter    -  1    Foot injury, left, initial encounter        Swelling of foot joint, left          Care Instructions    Xray - likely fracture of the fifth metatarsal bone in your left foot.      No weight bearing on left foot    Elevate as much as possible    Ice 10 to 15 minutes 3 x day     Use crutches    Leave splint in place    Referral to orthopedics - need to be seen this week    Tylenol up to 3 x day for pain    Tramadol every 6 hours as needed for severe pain          Follow-ups after your visit        Additional Services     ORTHOPEDICS ADULT REFERRAL       Your provider has referred you to:  [facility], [place], [place]    Please be aware that coverage of these services is subject to the terms and limitations of your health insurance plan.  Call member services at your health plan with any benefit or coverage questions.      Please bring the following to your appointment:    >>   Any x-rays, CTs or MRIs which have been performed.  Contact the facility where they were done to arrange for  prior to your scheduled appointment.    >>   List of current medications   >>   This referral request   >>   Any documents/labs given to you for this referral                  Future tests that were ordered for you today     Open Future Orders        Priority Expected Expires Ordered    XR Foot Left G/E 3 Views Routine 7/22/2018 7/22/2019 7/22/2018            Who to contact     If you have questions or need follow up information about today's clinic visit or your schedule please contact [facility] directly at [phone].  Normal or non-critical lab and imaging results will be communicated to you by Debitoshart, letter or phone within 4 business days after the clinic has received the results. If you do not hear from us within 7 days, please contact the clinic through Debitoshart or phone. If you have a critical or abnormal lab result, we will notify you by phone as soon as possible.  Submit refill requests through [employer] or call your pharmacy and they will forward the refill request to us. Please allow 3 business days for your refill to be completed.          Additional Information About Your Visit        Debitoshart Information     [employer] gives you secure access to your electronic health record. If you see a primary care provider, you can also send messages to your care team and make appointments. If you have questions, please call your primary care clinic.  If you do not have a primary care provider, please call [phone] and they will assist you.        Care EveryWhere ID     This is your Care EveryWhere ID. This could be used by other organizations to access your [place] medical records  [id number]        Your Vitals Were     Pulse Temperature Height BMI (Body Mass Index)          80 97.3  F (36.3  C) (Tympanic) 5' 5\" (1.651 m) 23.01 kg/m2         Blood Pressure from Last 3 Encounters:   07/22/18 116/80   06/12/18 120/74   02/20/18 118/82    Weight from Last 3 Encounters:   07/22/18 138 lb 4.8 oz (62.7 kg)   06/12/18 141 lb (64 kg)   02/20/18 141 lb 6.4 oz (64.1 kg)              We Performed the Following     Aluminum Crutches Adult     ORTHOPEDICS ADULT REFERRAL     Splint application          Today's Medication Changes          These changes are accurate as of 7/22/18  2:25 PM.  If you have any questions, ask your nurse or doctor.               Start taking these medicines.        Dose/Directions    traMADol 50 MG tablet   Commonly known as:  ULTRAM   Used for:  Closed displaced fracture of fifth " metatarsal bone of left foot, initial encounter   Started by:  Rin Philippe NP        Dose:  50 mg   Take 1 tablet (50 mg) by mouth every 6 hours as needed for severe pain   Quantity:  20 tablet   Refills:  0            Where to get your medicines      Some of these will need a paper prescription and others can be bought over the counter.  Ask your nurse if you have questions.     Bring a paper prescription for each of these medications     traMADol 50 MG tablet               Information about OPIOIDS     PRESCRIPTION OPIOIDS: WHAT YOU NEED TO KNOW   We gave you an opioid (narcotic) pain medicine. It is important to manage your pain, but opioids are not always the best choice. You should first try all the other options your care team gave you. Take this medicine for as short a time (and as few doses) as possible.     These medicines have risks:    DO NOT drive when on new or higher doses of pain medicine. These medicines can affect your alertness and reaction times, and you could be arrested for driving under the influence (DUI). If you need to use opioids long-term, talk to your care team about driving.    DO NOT operate heave machinery    DO NOT do any other dangerous activities while taking these medicines.     DO NOT drink any alcohol while taking these medicines.      If the opioid prescribed includes acetaminophen, DO NOT take with any other medicines that contain acetaminophen. Read all labels carefully. Look for the word  acetaminophen  or  Tylenol.  Ask your pharmacist if you have questions or are unsure.    You can get addicted to pain medicines, especially if you have a history of addiction (chemical, alcohol or substance dependence). Talk to your care team about ways to reduce this risk.    Store your pills in a secure place, locked if possible. We will not replace any lost or stolen medicine. If you don t finish your medicine, please throw away (dispose) as directed by your pharmacist. The  [place] Pollution Control Agency has more information about safe disposal: https://www.pca.[facility].mn.us/living-green/managing-unwanted-medications.     All opioids tend to cause constipation. Drink plenty of water and eat foods that have a lot of fiber, such as fruits, vegetables, prune juice, apple juice and high-fiber cereal. Take a laxative (Miralax, milk of magnesia, Colace, Senna) if you don t move your bowels at least every other day.          Primary Care Provider Office Phone # Fax #    Cris Betty Friedman [phone] [address] [facility] [address]        Equal Access to Services     RADHA BALL : Avila Ricci, wachintan zarate, carlos sapp, sun loomis. So [facility] [phone].    ATENCIÓN: Si habla español, tiene a hightower disposición servicios gratuitos de asistencia lingüística. Llame al [phone].    We comply with applicable federal civil rights laws and [place] laws. We do not discriminate on the basis of race, color, national origin, age, disability, sex, sexual orientation, or gender identity.            Thank you!     Thank you for choosing [facility]  for your care. Our goal is always to provide you with excellent care. Hearing back from our patients is one way we can continue to improve our services. Please take a few minutes to complete the written survey that you may receive in the mail after your visit with us. Thank you!             Your Updated Medication List - Protect others around you: Learn how to safely use, store and throw away your medicines at www.disposemymeds.org.          This list is accurate as of 7/22/18  2:25 PM.  Always use your most recent med list.                   Brand Name Dispense Instructions for use Diagnosis    buPROPion 150 MG 12 hr tablet    WELLBUTRIN SR    90 tablet    Take 1 tablet (150 mg) by mouth every morning    Attention deficit  hyperactivity disorder (ADHD), combined type       Etanercept 50 MG/ML autoinjector    ENBREL SURECLICK     Inject 50 mg Subcutaneous        norgestimate-ethinyl estradiol 0.25-35 MG-MCG per tablet    SPRINTEC 28    84 tablet    TAKE 1 TABLET BY MOUTH ONCE DAILY.    Encounter for initial prescription of contraceptive pills       sulindac 200 MG tablet    CLINORIL    180 tablet    Take 1 tablet (200 mg) by mouth 2 times daily (with meals)    JRA (juvenile rheumatoid arthritis) (H)       traMADol 50 MG tablet    ULTRAM    20 tablet    Take 1 tablet (50 mg) by mouth every 6 hours as needed for severe pain    Closed displaced fracture of fifth metatarsal bone of left foot, initial encounter

## 2018-07-22 NOTE — PATIENT INSTRUCTIONS
Xray - likely fracture of the fifth metatarsal bone in your left foot.      No weight bearing on left foot    Elevate as much as possible    Ice 10 to 15 minutes 3 x day     Use crutches    Leave splint in place    Referral to orthopedics - need to be seen this week    Tylenol up to 3 x day for pain    Tramadol every 6 hours as needed for severe pain

## 2018-07-22 NOTE — NURSING NOTE
Patient presents in the clinic with concerns of an injury to her left foot that occurred yesterday while playing volleyball.  Arielle Sultana LPN 7/22/2018 1:34 PM

## 2018-07-22 NOTE — LETTER
[facility]  [address]  [place] [address]  Phone: [phone]  Fax: [phone]    July 22, 2018        Nikky Singletary  [address]  [facility] [address]          To whom it may concern:    RE: Nikky Singletary    Patient was seen and treated today at our clinic for ankle injury.  She is unable to participate in any gym or work outs at this time and anticipate no return to activities for 6 to 8 weeks.      Please contact me for questions or concerns.      Sincerely,        Rin Philippe NP

## 2018-07-22 NOTE — PROGRESS NOTES
HPI:    Nikky Singletary is a 27 year old female  who presents to clinic today for left foot injury.    She was playing volleyball on uneven grass yesterday and felt her left foot give out.  She then had immediate swelling and pain followed by bruising about an hour after.  No pain or swelling of the ankle.  Painful to walk on the left foot.  No numbness.  Mild occasional tingling when not elevated.    Iced.  No compression.  Some elevation.   Ibuprofen 400 mg x 1 yesterday and once today.        Past Medical History:   Diagnosis Date     Acne, mild 11/18/2010     HEATH positive 7/11/2017     Attention deficit hyperactivity disorder (ADHD), combined type 2/1/2017    Overview:  Diagnosed in elementary age. Used some medications but cannot recall what at that time.      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 Followed at [facility] as a child.  Sees Dr Farmer twice a year     IBS (irritable bowel syndrome) 6/10/2015     Juvenile rheumatoid arthritis (H) 06/09/2014    Diagnosed at age 3 years, followed by [facility] as a child.  Follows Dr Farmer twice a year.     Myopia 3/23/2005     Tendinitis of extensor tendon of right hand 6/12/2018     Tendinitis of right elbow 2/1/2017     Varicella without complication     As a child     Past Surgical History:   Procedure Laterality Date     EXTRACTION(S) DENTAL       Social History   Substance Use Topics     Smoking status: Never Smoker     Smokeless tobacco: Former User     Types: Chew      Comment: Quit smoking: Rarely, with driving, roomate smokes     Alcohol use 0.0 oz/week      Comment: Alcoholic Drinks/day: occasional     Current Outpatient Prescriptions   Medication Sig Dispense Refill     buPROPion (WELLBUTRIN SR) 150 MG 12 hr tablet Take 1 tablet (150 mg) by mouth every morning 90 tablet 3     Etanercept (ENBREL SURECLICK) 50 MG/ML autoinjector Inject 50 mg Subcutaneous        "norgestimate-ethinyl estradiol (SPRINTEC 28) 0.25-35 MG-MCG per tablet TAKE 1 TABLET BY MOUTH ONCE DAILY. 84 tablet 3     sulindac (CLINORIL) 200 MG tablet Take 1 tablet (200 mg) by mouth 2 times daily (with meals) 180 tablet 3     traMADol (ULTRAM) 50 MG tablet Take 1 tablet (50 mg) by mouth every 6 hours as needed for severe pain 20 tablet 0     No Known Allergies      Past medical history, past surgical history, current medications and allergies reviewed and accurate to the best of my knowledge.        ROS:  Refer to HPI    /80 (BP Location: Right arm, Patient Position: Sitting, Cuff Size: Adult Regular)  Pulse 80  Temp 97.3  F (36.3  C) (Tympanic)  Ht 5' 5\" (1.651 m)  Wt 138 lb 4.8 oz (62.7 kg)  BMI 23.01 kg/m2    EXAM:  General Appearance: Well appearing adult female, appropriate appearance for age. No acute distress  Respiratory: normal chest wall and respirations.  Normal effort. No cough appreciated.   Cardiovascular:  CMS intact to left lower extremity, brisk capillary refill, palpable pedal pulse  Musculoskeletal:  Left dorsal and lateral foot foot with swelling and tenderness to palpation over the 3rd thru 5th metatarsals, most significantly over the dorsal anterior/lateral foot/5th metatarsal.  Left medial and lateral malleolus without swelling or tenderness to palpation.  No swelling or tenderness over the left achilles tendon.  Normal ROM of left ankle without difficulty, mild discomfort.  Able to wiggle left toes.   Dermatological: skin intact to left lower extremity, significant bruising noted over the left dorsal lateral foot - metatarsal area, no warmth, no erythema, no rash or abrasion, no drainage or bleeding, no foreign body appreciated.   Psychological: normal affect, alert and pleasant      Xray:    XR FOOT LT G/E 3 VW    HISTORY: 27 yearsFemale ; Foot injury, left, initial encounter;  Swelling of foot joint, left    TECHNIQUE: Left foot 3 views    COMPARISON: None    FINDINGS: " There is a nondisplaced fracture involving the base of the  fifth metatarsal.             Impression             IMPRESSION: Nondisplaced avulsion-type fracture involving the base of  the left fifth metatarsal.    ANGEL TSANG MD            ASSESSMENT/PLAN:    ICD-10-CM    1. Closed nondisplaced fracture of fifth metatarsal bone of right foot, initial encounter S92.354A ORTHOPEDICS ADULT REFERRAL     Aluminum Crutches Adult     Splint application     traMADol (ULTRAM) 50 MG tablet   2. Foot injury, left, initial encounter S99.922A XR Foot Left G/E 3 Views   3. Swelling of foot joint, left M25.475 XR Foot Left G/E 3 Views         Xray of left foot completed and reviewed, fracture noted of the proximal end of the fifth metatarsal, radiologist over read:  Non displaced avulsion-type fracture involving the base of the left fifth metatarsal.      RJ lower extremity splint applied using ortho glass and ace wrap    NWB with crutches    Elevate and ice    Tramadol 50 mg Q 6 hours PRN moderate to severe pain     Tylenol TID PRN    Referral to orthopedics - needs to be seen this week ([facility])

## 2018-07-23 ENCOUNTER — TRANSFERRED RECORDS (OUTPATIENT)
Dept: HEALTH INFORMATION MANAGEMENT | Facility: OTHER | Age: 28
End: 2018-07-23

## 2018-07-23 NOTE — PROGRESS NOTES
Patient Information     Patient Name  Nikky Singletary MRN  [id number] Sex  Female   1990      Letter by Cris Friedman MD at      Author:  Cris Friedman MD Service:  (none) Author Type:  (none)    Filed:   Encounter Date:  2017 Status:  (Other)           Nikky Singletary  [id number] [facility] [address]          2017    Dear Ms. Singletary:    The result from the Pap and HPV (Human Papilloma Virus) test(s) you had done at your recent clinic visit came back as normal.     We recommend that you have an adult physical exam each year.Your next pap can be in 3 years.  If you have any further questions or concerns, please call [phone]. You may also contact us by using medical messaging if you have MyChart.    Thank you for choosing [facility] to participate in your healthcare needs.    Sincerely,    Cris Friedman MD  10:38 AM 2017

## 2018-09-05 ENCOUNTER — OFFICE VISIT (OUTPATIENT)
Dept: FAMILY MEDICINE | Facility: OTHER | Age: 28
End: 2018-09-05
Attending: FAMILY MEDICINE
Payer: COMMERCIAL

## 2018-09-05 VITALS
BODY MASS INDEX: 23.13 KG/M2 | WEIGHT: 139 LBS | HEART RATE: 74 BPM | DIASTOLIC BLOOD PRESSURE: 82 MMHG | SYSTOLIC BLOOD PRESSURE: 122 MMHG | TEMPERATURE: 97.1 F

## 2018-09-05 DIAGNOSIS — J02.9 SORE THROAT: ICD-10-CM

## 2018-09-05 DIAGNOSIS — J02.9 VIRAL PHARYNGITIS: Primary | ICD-10-CM

## 2018-09-05 LAB
DEPRECATED S PYO AG THROAT QL EIA: NORMAL
SPECIMEN SOURCE: NORMAL

## 2018-09-05 PROCEDURE — 87880 STREP A ASSAY W/OPTIC: CPT | Performed by: FAMILY MEDICINE

## 2018-09-05 PROCEDURE — 99213 OFFICE O/P EST LOW 20 MIN: CPT | Performed by: FAMILY MEDICINE

## 2018-09-05 RX ORDER — HYDROCODONE BITARTRATE AND ACETAMINOPHEN 5; 325 MG/1; MG/1
TABLET ORAL
Refills: 0 | COMMUNITY
Start: 2018-07-23 | End: 2018-12-31

## 2018-09-05 ASSESSMENT — PAIN SCALES - GENERAL: PAINLEVEL: MILD PAIN (3)

## 2018-09-05 NOTE — PROGRESS NOTES
Nursing Notes:   Rin Mcdowell  9/5/2018 10:04 AM  Signed  Nikky presents to the clinic today for concerns of strep.     SUBJECTIVE: 27 year old female with sore throat, swollen glands, and headache for 4 days. She has taken OTC cough drops and throat sprays which have both only minimally relieved her throat symptoms. She further described drinking cool water as a relieving factor. She states she has not recently been ill or had any sick contacts. No history of rheumatic fever.     OBJECTIVE:   /82 (BP Location: Right arm, Patient Position: Sitting, Cuff Size: Adult Regular)  Pulse 74  Temp 97.1  F (36.2  C)  Wt 139 lb (63 kg)  BMI 23.13 kg/m2    Appears well and in no apparent distress.   Ears: Ear canals are free of discharge, erythema, edema, and masses bilaterally. Cone of light is visible on tympanic membranes bilaterally, no perforations or scarring. TM are pearly white bilaterally.   Oropharynx: Oral mucosa is pink and without ulcers, nodules, or lesions. Tonsils are erythematous and edematous bilaterally. No exudate present on tonsils.   Neck: Lymphadenopathy on inspection and palpation of tonsillar lymph nodes bilaterally.   Results for orders placed or performed in visit on 09/05/18 (from the past 24 hour(s))   Strep, Rapid Screen   Result Value Ref Range    Specimen Description Throat     Rapid Strep A Screen       Negative presumptive for Group A Beta Streptococcus     I have personally reviewed the labs listed above.      ASSESSMENT: Viral pharyngitis    PLAN:   Symptomatic treatment recommended.  Consider gargling with salt water or diluted hydrogen peroxide.  Discussed that symptoms may develop into a viral URI with some congestion.  Cris Friedman MD  1:12 PM 9/5/2018   Portions of this dictation were created using the Dragon Nuance voice recognition system. Proofreading was completed but there may be errors in text.      I was present with the physician assistant student who  participated in the service and in the documentation of this note.  I have verified the history and personally performed a physical exam and medical decision making, and have verified the content of the note, which accurately reflects my assessment of the patient and the plan of care.

## 2018-09-05 NOTE — MR AVS SNAPSHOT
After Visit Summary   9/5/2018    Nikky Singletary    MRN: [id number]           Patient Information     Date Of Birth          1990        Visit Information        Provider Department      9/5/2018 10:00 AM Cris Friedman MD [facility]        Today's Diagnoses     Viral pharyngitis    -  1    Sore throat          Care Instructions      Self-Care for Sore Throats    Sore throats happen for many reasons, such as colds, allergies, and infections caused by viruses or bacteria. In any case, your throat becomes red and sore. Your goal for self-care is to reduce your discomfort while giving your throat a chance to heal.  Moisten and soothe your throat  Tips include the following:    Try a sip of water first thing after waking up.    Keep your throat moist by drinking 6 or more glasses of clear liquids every day.    Run a cool-air humidifier in your room overnight.    Avoid cigarette smoke.     Suck on throat lozenges, cough drops, hard candy, ice chips, or frozen fruit-juice bars. Use the sugar-free versions if your diet or medical condition requires them.  Gargle to ease irritation  Gargling every hour or 2 can ease irritation. Try gargling with 1 of these solutions:    1/4 teaspoon of salt in 1/2 cup of warm water    An over-the-counter anesthetic gargle  Use medicine for more relief  Over-the-counter medicine can reduce sore throat symptoms. Ask your pharmacist if you have questions about which medicine to use:    Ease pain with anesthetic sprays. Aspirin or an aspirin substitute also helps. Remember, never give aspirin to anyone 18 or younger, or if you are already taking blood thinners.     For sore throats caused by allergies, try antihistamines to block the allergic reaction.    Remember: unless a sore throat is caused by a bacterial infection, antibiotics won t help you.  Prevent future sore throats  Prevention tips include the following:    Stop smoking or reduce  contact with secondhand smoke. Smoke irritates the tender throat lining.    Limit contact with pets and with allergy-causing substances, such as pollen and mold.    When you re around someone with a sore throat or cold, wash your hands often to keep viruses or bacteria from spreading.    Don t strain your vocal cords.  Call your healthcare provider  Contact your healthcare provider if you have:    A temperature over 101 F (38.3 C)    White spots on the throat    Great difficulty swallowing    Trouble breathing    A skin rash    Recent exposure to someone else with strep bacteria    Severe hoarseness and swollen glands in the neck or jaw   Date Last Reviewed: 8/1/2016 2000-2017 [employer]. [address] [place] [id number]. All rights reserved. This information is not intended as a substitute for professional medical care. Always follow your healthcare professional's instructions.                Follow-ups after your visit        Who to contact     If you have questions or need follow up information about today's clinic visit or your schedule please contact [facility] directly at [phone].  Normal or non-critical lab and imaging results will be communicated to you by iBox[employer], letter or phone within 4 business days after the clinic has received the results. If you do not hear from us within 7 days, please contact the clinic through [facility] or phone. If you have a critical or abnormal lab result, we will notify you by phone as soon as possible.  Submit refill requests through [employer] or call your pharmacy and they will forward the refill request to us. Please allow 3 business days for your refill to be completed.          Additional Information About Your Visit        Twones Information     [employer] gives you secure access to your electronic health record. If you see a primary care provider, you can also send messages to your care team and make appointments. If you  have questions, please call your primary care clinic.  If you do not have a primary care provider, please call [phone] and they will assist you.        Care EveryWhere ID     This is your Care EveryWhere ID. This could be used by other organizations to access your [facility] medical records  [id number]        Your Vitals Were     Pulse Temperature BMI (Body Mass Index)             74 97.1  F (36.2  C) 23.13 kg/m2          Blood Pressure from Last 3 Encounters:   09/05/18 122/82   07/22/18 116/80   06/12/18 120/74    Weight from Last 3 Encounters:   09/05/18 139 lb (63 kg)   07/22/18 138 lb 4.8 oz (62.7 kg)   06/12/18 141 lb (64 kg)              We Performed the Following     Strep, Rapid Screen       Information about OPIOIDS     PRESCRIPTION OPIOIDS: WHAT YOU NEED TO KNOW   We gave you an opioid (narcotic) pain medicine. It is important to manage your pain, but opioids are not always the best choice. You should first try all the other options your care team gave you. Take this medicine for as short a time (and as few doses) as possible.    Some activities can increase your pain, such as bandage changes or therapy sessions. It may help to take your pain medicine 30 to 60 minutes before these activities. Reduce your stress by getting enough sleep, working on hobbies you enjoy and practicing relaxation or meditation. Talk to your care team about ways to manage your pain beyond prescription opioids.    These medicines have risks:    DO NOT drive when on new or higher doses of pain medicine. These medicines can affect your alertness and reaction times, and you could be arrested for driving under the influence (DUI). If you need to use opioids long-term, talk to your care team about driving.    DO NOT operate heavy machinery    DO NOT do any other dangerous activities while taking these medicines.    DO NOT drink any alcohol while taking these medicines.     If the opioid prescribed includes acetaminophen, DO NOT  take with any other medicines that contain acetaminophen. Read all labels carefully. Look for the word  acetaminophen  or  Tylenol.  Ask your pharmacist if you have questions or are unsure.    You can get addicted to pain medicines, especially if you have a history of addiction (chemical, alcohol or substance dependence). Talk to your care team about ways to reduce this risk.    All opioids tend to cause constipation. Drink plenty of water and eat foods that have a lot of fiber, such as fruits, vegetables, prune juice, apple juice and high-fiber cereal. Take a laxative (Miralax, milk of magnesia, Colace, Senna) if you don t move your bowels at least every other day. Other side effects include upset stomach, sleepiness, dizziness, throwing up, tolerance (needing more of the medicine to have the same effect), physical dependence and slowed breathing.    Store your pills in a secure place, locked if possible. We will not replace any lost or stolen medicine. If you don t finish your medicine, please throw away (dispose) as directed by your pharmacist. The [place] Pollution Control Agency has more information about safe disposal: [email or web address]://[email or web address]/living-green/[email or web address]-[email or web address]-medications         Primary Care Provider Office Phone # Fax #    Cris Friedman [phone]       [address] [facility] [address]        Equal Access to Services     RADHA BALL AH: Hadii tatiana mathew hadasho Sosimali, waaxda luqadaha, qaybta kaalmada sekou, sun loomis. So [facility] [phone].    ATENCIÓN: Si habla español, tiene a hightower disposición servicios gratuitos de asistencia lingüística. Llame al [phone].    We comply with applicable federal civil rights laws and [place] laws. We do not discriminate on the basis of race, color, national origin, age, disability, sex, sexual orientation, or gender identity.            Thank you!     Thank you for choosing [facility]  for your care. Our goal is always to provide you with excellent care. Hearing back from our patients is one way we can continue to improve our services. Please take a few minutes to complete the written survey that you may receive in the mail after your visit with us. Thank you!             Your Updated Medication List - Protect others around you: Learn how to safely use, store and throw away your medicines at www.disposemymeds.org.          This list is accurate as of 9/5/18  1:13 PM.  Always use your most recent med list.                   Brand Name Dispense Instructions for use Diagnosis    buPROPion 150 MG 12 hr tablet    WELLBUTRIN SR    90 tablet    Take 1 tablet (150 mg) by mouth every morning    Attention deficit hyperactivity disorder (ADHD), combined type       Etanercept 50 MG/ML autoinjector    ENBREL SURECLICK     Inject 50 mg Subcutaneous        HYDROcodone-acetaminophen 5-325 MG per tablet    NORCO     TAKE 1 OR 2 TABLETS BY MOUTH NIGHTLY AT BEDTIME AS NEEDED        norgestimate-ethinyl estradiol 0.25-35 MG-MCG per tablet    SPRINTEC 28    84 tablet    TAKE 1 TABLET BY MOUTH ONCE DAILY.    Encounter for initial prescription of contraceptive pills       sulindac 200 MG tablet    CLINORIL    180 tablet    Take 1 tablet (200 mg) by mouth 2 times daily (with meals)    JRA (juvenile rheumatoid arthritis) (H)

## 2018-09-05 NOTE — PATIENT INSTRUCTIONS
Self-Care for Sore Throats    Sore throats happen for many reasons, such as colds, allergies, and infections caused by viruses or bacteria. In any case, your throat becomes red and sore. Your goal for self-care is to reduce your discomfort while giving your throat a chance to heal.  Moisten and soothe your throat  Tips include the following:    Try a sip of water first thing after waking up.    Keep your throat moist by drinking 6 or more glasses of clear liquids every day.    Run a cool-air humidifier in your room overnight.    Avoid cigarette smoke.     Suck on throat lozenges, cough drops, hard candy, ice chips, or frozen fruit-juice bars. Use the sugar-free versions if your diet or medical condition requires them.  Gargle to ease irritation  Gargling every hour or 2 can ease irritation. Try gargling with 1 of these solutions:    1/4 teaspoon of salt in 1/2 cup of warm water    An over-the-counter anesthetic gargle  Use medicine for more relief  Over-the-counter medicine can reduce sore throat symptoms. Ask your pharmacist if you have questions about which medicine to use:    Ease pain with anesthetic sprays. Aspirin or an aspirin substitute also helps. Remember, never give aspirin to anyone 18 or younger, or if you are already taking blood thinners.     For sore throats caused by allergies, try antihistamines to block the allergic reaction.    Remember: unless a sore throat is caused by a bacterial infection, antibiotics won t help you.  Prevent future sore throats  Prevention tips include the following:    Stop smoking or reduce contact with secondhand smoke. Smoke irritates the tender throat lining.    Limit contact with pets and with allergy-causing substances, such as pollen and mold.    When you re around someone with a sore throat or cold, wash your hands often to keep viruses or bacteria from spreading.    Don t strain your vocal cords.  Call your healthcare provider  Contact your healthcare provider if  you have:    A temperature over 101 F (38.3 C)    White spots on the throat    Great difficulty swallowing    Trouble breathing    A skin rash    Recent exposure to someone else with strep bacteria    Severe hoarseness and swollen glands in the neck or jaw   Date Last Reviewed: 8/1/2016 2000-2017 [employer]. [address] [place], [place] [id number]. All rights reserved. This information is not intended as a substitute for professional medical care. Always follow your healthcare professional's instructions.

## 2018-09-14 ENCOUNTER — MYC MEDICAL ADVICE (OUTPATIENT)
Dept: FAMILY MEDICINE | Facility: OTHER | Age: 28
End: 2018-09-14

## 2018-09-24 ENCOUNTER — MYC MEDICAL ADVICE (OUTPATIENT)
Dept: FAMILY MEDICINE | Facility: OTHER | Age: 28
End: 2018-09-24

## 2018-09-24 DIAGNOSIS — M77.8 TENDINITIS OF RIGHT WRIST: Primary | ICD-10-CM

## 2018-10-12 ENCOUNTER — OFFICE VISIT (OUTPATIENT)
Dept: ORTHOPEDICS | Facility: OTHER | Age: 28
End: 2018-10-12
Attending: FAMILY MEDICINE
Payer: OTHER MISCELLANEOUS

## 2018-10-12 DIAGNOSIS — M77.8 TENDINITIS OF RIGHT WRIST: ICD-10-CM

## 2018-10-12 NOTE — MR AVS SNAPSHOT
After Visit Summary   10/12/2018    Nikky Singletary    MRN: [id number]           Patient Information     Date Of Birth          1990        Visit Information        Provider Department      10/12/2018 10:45 AM Kole Badillo MD [facility]        Today's Diagnoses     Tendinitis of right wrist           Follow-ups after your visit        Who to contact     If you have questions or need follow up information about today's clinic visit or your schedule please contact [facility] directly at [phone].  Normal or non-critical lab and imaging results will be communicated to you by [employer], letter or phone within 4 business days after the clinic has received the results. If you do not hear from us within 7 days, please contact the clinic through [employer] or phone. If you have a critical or abnormal lab result, we will notify you by phone as soon as possible.  Submit refill requests through [employer] or call your pharmacy and they will forward the refill request to us. Please allow 3 business days for your refill to be completed.          Additional Information About Your Visit        MyChart Information     [employer] gives you secure access to your electronic health record. If you see a primary care provider, you can also send messages to your care team and make appointments. If you have questions, please call your primary care clinic.  If you do not have a primary care provider, please call [phone] and they will assist you.        Care EveryWhere ID     This is your Care EveryWhere ID. This could be used by other organizations to access your [facility] medical records  [id number]         Blood Pressure from Last 3 Encounters:   09/05/18 122/82   07/22/18 116/80   06/12/18 120/74    Weight from Last 3 Encounters:   09/05/18 63 kg (139 lb)   07/22/18 62.7 kg (138 lb 4.8 oz)   06/12/18 64 kg (141 lb)              Today, you had the following     No  orders found for display       Primary Care Provider Office Phone # Fax #    Cris Betty Friedman [phone] [address]  [place] [address]        Equal Access to Services     RADHA BALL : Hadii aad ku hadbalo Soslade, waaxda luqadaha, qaybta kaalmada adebethyada, sun nidhiin hayaamray gibsonmicaelarayne loomis. So [facility] [phone].    ATENCIÓN: Si habla español, tiene a hightower disposición servicios gratuitos de asistencia lingüística. Llame al [phone].    We comply with applicable federal civil rights laws and [place] laws. We do not discriminate on the basis of race, color, national origin, age, disability, sex, sexual orientation, or gender identity.            Thank you!     Thank you for choosing [facility] AND [facility]  for your care. Our goal is always to provide you with excellent care. Hearing back from our patients is one way we can continue to improve our services. Please take a few minutes to complete the written survey that you may receive in the mail after your visit with us. Thank you!             Your Updated Medication List - Protect others around you: Learn how to safely use, store and throw away your medicines at www.disposemymeds.org.          This list is accurate as of 10/12/18 11:59 PM.  Always use your most recent med list.                   Brand Name Dispense Instructions for use Diagnosis    buPROPion 150 MG 12 hr tablet    WELLBUTRIN SR    90 tablet    Take 1 tablet (150 mg) by mouth every morning    Attention deficit hyperactivity disorder (ADHD), combined type       Etanercept 50 MG/ML autoinjector    ENBREL SURECLICK     Inject 50 mg Subcutaneous        HYDROcodone-acetaminophen 5-325 MG per tablet    NORCO     TAKE 1 OR 2 TABLETS BY MOUTH NIGHTLY AT BEDTIME AS NEEDED        norgestimate-ethinyl estradiol 0.25-35 MG-MCG per tablet    SPRINTEC 28    84 tablet    TAKE 1 TABLET BY MOUTH ONCE DAILY.    Encounter for initial prescription of  contraceptive pills       sulindac 200 MG tablet    CLINORIL    180 tablet    Take 1 tablet (200 mg) by mouth 2 times daily (with meals)    JRA (juvenile rheumatoid arthritis) (H)

## 2018-10-18 NOTE — PROGRESS NOTES
VITALS:   Height:   65  Weight:   140    CHIEF COMPLAINT: Right Wrist Pain    PROBLEMS:   Patient has no noted problems.    PATIENT REPORTED MEDICATIONS:  SULINDAC TABLET (SULINDAC TABS)   BUPROPION HCL TABLET (BUPROPION HCL TABS)   SPRINTEC 28 TABLET (NORGESTIMATE-ETH ESTRADIOL TABS)   ENBREL SURECLICK SOLUTION AUTO-INJECTOR (ETANERCEPT SOAJ)     Medications were reviewed with patient this visit.    PATIENT REPORTED ALLERGIES:   Patient has no noted allergies.    Allergies were reviewed during this visit.    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  She has a history of rheumatoid arthritis, which was diagnosed apparently when she was about age 3.   She describes discomfort about the right wrist.   She thinks this could be improved with ergonomic adjustments in the office and apparently these are being investigated.   She notices that when she is off work her symptoms improve as well.   There is no history of trauma, numbness, or tingling, but she is off for a week and her wrist feels better.    PMH:   Health history form dated 10/12/18 is reviewed and signed.  Past medical history, surgical history, social history, family history, medications, and review of systems is noted and scanned into the chart.     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  The patient is pleasant, alert, oriented x3, appropriate, in no acute distress.    The patient's gait and station are appropriate.  The patient is well groomed, well kempt.  Skin is healthy and intact with no erythema, warmth, rashes, or bruising.   Normal capillary refill.  Pulses are palpable.  Motor is five out of five in all muscle groups tested.   Sensory exam is intact.    Musculoskeletal:        Examination of both upper extremities reveals full and symmetric range of motion of elbows, wrists, hands.   There is no evidence of obvious extensor or flexor tenosynovitis.   She has minimal tenderness about the extensor carpi ulnaris.  The pisotriquetral joint, hamulus, distal radioulnar joint are all nontender.    X-RAY:  I did review x-rays of the right wrist.  These show uniform mineralization, well maintained joint spaces, no evidence of fracture or dislocation.    ASSESSMENT:    History rheumatoid arthritis with ulnar-sided right wrist pain    PLAN:   At this point we had a long discussion.   Her x-rays show no evidence of any erosive changes.   She does not show any significant swelling about the extensor tendon.  I told her that antiinflammatory medications could be used and injection could be considered if an area of local tenderness was identified, but there is no swelling.   Job modifications may be her best alternative at this point.   Should she elect to proceed with an injection we could proceed with this.    Dictated by Kole Badillo M.D.  D:  10/12/18  T:   10/18/18    Copy to:  Elder WING, Cris SALAS     Typed and/or reviewed and corrected by signing  below, and sent to the Physician for final review and signature.     This report was created using voice recording software and computer-generated templates. Although every effort has  been made to review for and eliminate errors, some errors may still occur.     Electronically signed by Kellen Fitzpatrick  on 10/18/2018 at 9:18 AM    Electronically signed by Kole Badillo MD on 10/18/2018 at 9:44 AM  ________________________________________________________________________

## 2018-12-31 ENCOUNTER — OFFICE VISIT (OUTPATIENT)
Dept: FAMILY MEDICINE | Facility: OTHER | Age: 28
End: 2018-12-31
Attending: NURSE PRACTITIONER
Payer: COMMERCIAL

## 2018-12-31 VITALS
DIASTOLIC BLOOD PRESSURE: 76 MMHG | WEIGHT: 145 LBS | SYSTOLIC BLOOD PRESSURE: 122 MMHG | TEMPERATURE: 97.7 F | HEART RATE: 88 BPM | BODY MASS INDEX: 24.13 KG/M2

## 2018-12-31 DIAGNOSIS — B97.89 ACUTE VIRAL SINUSITIS: Primary | ICD-10-CM

## 2018-12-31 DIAGNOSIS — J01.90 ACUTE VIRAL SINUSITIS: Primary | ICD-10-CM

## 2018-12-31 PROCEDURE — 99213 OFFICE O/P EST LOW 20 MIN: CPT | Performed by: NURSE PRACTITIONER

## 2018-12-31 ASSESSMENT — PAIN SCALES - GENERAL: PAINLEVEL: MODERATE PAIN (4)

## 2018-12-31 NOTE — PROGRESS NOTES
"HPI:    Nikky Singletary is a 28 year old female  who presents to clinic today for sinus.    Nasal and sinus congestion and pressure in cheeks for the past 3 to 4 days.  Nose started draining today.  Mild post nasal drainage sensation.  Bilateral ears ringing, popping.  Headaches yesterday and today.  Minimal occasional cough just starting today.  Dry cough.  No chest tightness.  No shortness of breath.  Intermittent throat discomfort, states she \"never gets strep.\"  Feeling fatigued.  General body aches.  No fevers.  No chills.      Taking OTC sinus decongestant without much improvement.  On Enbrel and Sulindac for RA.      Past Medical History:   Diagnosis Date     Acne, mild 11/18/2010     HEATH positive 7/11/2017     Attention deficit hyperactivity disorder (ADHD), combined type 2/1/2017    Overview:  Diagnosed in elementary age. Used some medications but cannot recall what at that time.      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 Followed at [facility] as a child.  Sees Dr Farmer twice a year     IBS (irritable bowel syndrome) 6/10/2015     Juvenile rheumatoid arthritis (H) 06/09/2014    Diagnosed at age 3 years, followed by [facility] as a child.  Follows Dr Farmer twice a year.     Myopia 3/23/2005     Tendinitis of extensor tendon of right hand 6/12/2018     Tendinitis of right elbow 2/1/2017     Varicella without complication     As a child     Past Surgical History:   Procedure Laterality Date     EXTRACTION(S) DENTAL       Social History     Tobacco Use     Smoking status: Never Smoker     Smokeless tobacco: Former User     Types: Chew     Tobacco comment: Quit smoking: Rarely, with driving, roomate smokes   Substance Use Topics     Alcohol use: Yes     Alcohol/week: 0.0 oz     Comment: Alcoholic Drinks/day: occasional     Current Outpatient Medications   Medication Sig Dispense Refill     buPROPion (WELLBUTRIN SR) 150 MG " 12 hr tablet Take 1 tablet (150 mg) by mouth every morning 90 tablet 3     Etanercept (ENBREL SURECLICK) 50 MG/ML autoinjector Inject 50 mg Subcutaneous       norgestimate-ethinyl estradiol (SPRINTEC 28) 0.25-35 MG-MCG per tablet TAKE 1 TABLET BY MOUTH ONCE DAILY. 84 tablet 3     sulindac (CLINORIL) 200 MG tablet Take 1 tablet (200 mg) by mouth 2 times daily (with meals) 180 tablet 3     No Known Allergies      Past medical history, past surgical history, current medications and allergies reviewed and accurate to the best of my knowledge.        ROS:  Refer to HPI    /76 (BP Location: Right arm, Patient Position: Sitting, Cuff Size: Adult Regular)   Pulse 88   Temp 97.7  F (36.5  C) (Tympanic)   Wt 65.8 kg (145 lb)   BMI 24.13 kg/m      EXAM:  General Appearance: Well appearing adult female, appropriate appearance for age. No acute distress  Head: normocephalic, atraumatic  Ears: Left TM grey, translucent with bony landmarks appreciated, no erythema, no effusion, no bulging, no purulence.  Right TM grey, translucent with bony landmarks appreciated, no erythema, no effusion, no bulging, no purulence.  Left auditory canal clear.  Right auditory canal clear.  Normal external ears, non tender.  Eyes: conjunctivae normal without erythema or irritation, no drainage or crusting, no eyelid swelling, pupils equal   Orophayrnx: moist mucous membranes, posterior pharynx without erythema, tonsils without hypertrophy, no erythema, no exudates or petechiae, no post nasal drip seen, no trismus.    Sinuses:  Mild bilateral maxillary sinus tenderness upon palpation, non tender frontal sinuses  Nose:  Bilateral nares: no erythema, no edema, no drainage or congestion noted  Neck: Bilateral tonsillar lymph nodes, non tender to palpation   Respiratory: normal chest wall and respirations.  Normal effort.  Clear to auscultation bilaterally, no wheezing, crackles or rhonchi.  No increased work of breathing.  No cough  appreciated.  Cardiac: RRR with no murmurs  Musculoskeletal:  Normal gait.  Equal movement of bilateral upper extremities.  Equal movement of bilateral lower extremities.    Dermatological: no rashes noted of exposed skin  Psychological: normal affect, alert and pleasant          ASSESSMENT/PLAN:  1. Acute viral sinusitis    Discussed with patient that symptoms are consistent with viral illness and more inflammatory than infectious at this time.    Encouraged symptomatic treatment including saline nasal spray, humidifier, steamy shower, etc.    Patient is immunocompromised due to use of Enbrel for RA so she is provided with a written Rx for Augmentin to only be filled if symptoms persist without improvement over the next 5 to 7 days or worsening.      - amoxicillin-clavulanate (AUGMENTIN) 875-125 MG tablet; Take 1 tablet by mouth 2 times daily for 7 days  Dispense: 14 tablet; Refill: 0    Discussed warning signs/symptoms indicative of need to f/u    Follow up if symptoms persist or worsen or concerns

## 2018-12-31 NOTE — NURSING NOTE
"Chief Complaint   Patient presents with     Sinus Problem     Pt present to clinic today for a headaches and sinus pressure she has had since Friday.  Initial /76 (BP Location: Right arm, Patient Position: Sitting, Cuff Size: Adult Regular)   Pulse 88   Temp 97.7  F (36.5  C) (Tympanic)   Wt 65.8 kg (145 lb)   BMI 24.13 kg/m   Estimated body mass index is 24.13 kg/m  as calculated from the following:    Height as of 7/22/18: 1.651 m (5' 5\").    Weight as of this encounter: 65.8 kg (145 lb).  Medication Reconciliation: complete    Salena Patterson LPN  "

## 2018-12-31 NOTE — PATIENT INSTRUCTIONS
Patient Education     Sinusitis (No Antibiotics)    The sinuses are air-filled spaces within the bones of the face. They connect to the inside of the nose. Sinusitis is an inflammation of the tissue that lines the sinuses. Sinusitis can occur during a cold. It can also happen due to allergies to pollens and other particles in the air. It can cause symptoms such as sinus congestion, headache, sore throat, facial swelling, and a feeling of fullness. It may also cause a low-grade fever. Your sinusitis does not include an infection with bacteria. Because of this, antibiotics are not used to treat this problem.  Home care    Drink plenty of water, hot tea, and other liquids. This may help thin nasal mucus. It also may help your sinuses drain fluids.    Heat may help soothe painful areas of your face. Use a towel soaked in hot water. Or,  the shower and direct the warm spray onto your face. Using a vaporizer along with a menthol rub at night may also help soothe symptoms.     An expectorant with guaifenesin may help thin nasal mucus and help your sinuses drain fluids.    You can use an over-the-counter decongestant, unless a similar medicine was prescribed to you. Nasal sprays work the fastest. Use one that contains phenylephrine or oxymetazoline. First blow your nose gently. Then use the spray. Do not use these medicines more often than directed on the label. If you do, your symptoms may get worse. You may also take pills that contain pseudoephedrine. Don t use products that combine multiple medicines. This is because side effects may be increased. Read all medicine labels. You can also ask the pharmacist for help. (People with high blood pressure should not use decongestants. They can raise blood pressure.)    Over-the-counter antihistamines may help if allergies contributed to your sinusitis.      Use acetaminophen or ibuprofen to control pain, unless another pain medicine was prescribed to you. If you have  chronic liver or kidney disease or ever had a stomach ulcer, talk with your healthcare provider before using these medicines. (Aspirin should never be taken by anyone under age 18 who is ill with a fever. It may cause severe liver damage.)    Use nasal rinses or irrigation as instructed by your healthcare provider.    Don't smoke. This can make symptoms worse.  Follow-up care  Follow up with your healthcare provider or our staff if you are NOT better in 1 week.  When to seek medical advice  Call your healthcare provider if any of these occur:    Green or yellow fluid draining from your nose or into your throat    Facial pain or headache that gets worse    Stiff neck    Unusual drowsiness or confusion    Swelling of your forehead or eyelids    Vision problems, such as blurred or double vision    Fever of 100.4 F (38 C) or higher, or as directed by your healthcare provider    Seizure    Breathing problems    Symptoms that don't go away in 10 days  Date Last Reviewed: 11/1/2017 2000-2018 [employer]. [address] [place] [address], [place] [address]. All rights reserved. This information is not intended as a substitute for professional medical care. Always follow your healthcare professional's instructions.

## 2019-03-07 ENCOUNTER — MYC MEDICAL ADVICE (OUTPATIENT)
Dept: FAMILY MEDICINE | Facility: OTHER | Age: 29
End: 2019-03-07

## 2019-03-27 ENCOUNTER — MYC MEDICAL ADVICE (OUTPATIENT)
Dept: FAMILY MEDICINE | Facility: OTHER | Age: 29
End: 2019-03-27

## 2019-03-27 DIAGNOSIS — M25.562 ACUTE PAIN OF LEFT KNEE: Primary | ICD-10-CM

## 2019-03-27 DIAGNOSIS — M25.562 LEFT KNEE PAIN: ICD-10-CM

## 2019-03-27 DIAGNOSIS — Z87.81 HISTORY OF FOOT FRACTURE: ICD-10-CM

## 2019-05-02 DIAGNOSIS — M79.672 LEFT FOOT PAIN: Primary | ICD-10-CM

## 2019-05-02 DIAGNOSIS — M25.562 LEFT KNEE PAIN, UNSPECIFIED CHRONICITY: ICD-10-CM

## 2019-05-07 ENCOUNTER — HOSPITAL ENCOUNTER (OUTPATIENT)
Dept: GENERAL RADIOLOGY | Facility: OTHER | Age: 29
Discharge: HOME | End: 2019-05-07
Attending: ORTHOPAEDIC SURGERY | Admitting: ORTHOPAEDIC SURGERY
Payer: COMMERCIAL

## 2019-05-07 ENCOUNTER — OFFICE VISIT (OUTPATIENT)
Dept: ORTHOPEDICS | Facility: OTHER | Age: 29
End: 2019-05-07
Attending: FAMILY MEDICINE
Payer: COMMERCIAL

## 2019-05-07 ENCOUNTER — HOSPITAL ENCOUNTER (OUTPATIENT)
Dept: GENERAL RADIOLOGY | Facility: OTHER | Age: 29
End: 2019-05-07
Attending: ORTHOPAEDIC SURGERY
Payer: COMMERCIAL

## 2019-05-07 DIAGNOSIS — M79.672 LEFT FOOT PAIN: ICD-10-CM

## 2019-05-07 DIAGNOSIS — M25.562 ACUTE PAIN OF LEFT KNEE: ICD-10-CM

## 2019-05-07 DIAGNOSIS — Z87.81 HISTORY OF FOOT FRACTURE: ICD-10-CM

## 2019-05-07 DIAGNOSIS — M25.562 LEFT KNEE PAIN, UNSPECIFIED CHRONICITY: ICD-10-CM

## 2019-05-07 PROCEDURE — 73560 X-RAY EXAM OF KNEE 1 OR 2: CPT | Mod: RT

## 2019-05-07 PROCEDURE — G0463 HOSPITAL OUTPT CLINIC VISIT: HCPCS | Mod: 25

## 2019-05-07 PROCEDURE — 73630 X-RAY EXAM OF FOOT: CPT

## 2019-05-07 PROCEDURE — G0463 HOSPITAL OUTPT CLINIC VISIT: HCPCS

## 2019-05-10 NOTE — PROGRESS NOTES
VITALS:   Height:   65  Weight:   140    CHIEF COMPLAINT: Left Foot Pain, Left Knee Pain     PROBLEMS:   Patient has no noted problems.    PATIENT REPORTED MEDICATIONS:  SULINDAC TABLET (SULINDAC TABS)   BUPROPION HCL TABLET (BUPROPION HCL TABS)   SPRINTEC 28 TABLET (NORGESTIMATE-ETH ESTRADIOL TABS)   ENBREL SURECLICK SOLUTION AUTO-INJECTOR (ETANERCEPT SOAJ)     Medications were reviewed with patient this visit.    PATIENT REPORTED ALLERGIES:   Patient has no noted allergies.      HISTORY OF PRESENT ILLNESS:    REASON FOR EVALUATION:         1.  Left foot pain.       2.  Left knee pain.    HISTORY OF PRESENT ILLNESS:  Nikky comes in last July fractured her foot after a rolling incident.  Was treated with a long cast boot for a while.  Is reporting now she is still has continuation of pain over the lateral aspect.  Has to be careful with shoe wear, is unable to use flat soles or no shoes without significant pain.  Mostly in the lateral aspect overlying the base of the fifth metatarsal, which is where she fractured.  Wanted to have things checked out and evaluated there.      She also reports left knee pain after doing a lot of volleyball this fall.  Feels like a pop sensation, occasional catch and lock within her joint, as well . Feels like there may be something potentially mobile within that joint.  It seems to get trapped at times. This does cause her pain and discomfort, and activity can exacerbate this.  Is not using any bracing.  Has not undergone any formalized therapy at this time . Pain is mostly in the anteromedial aspect at this point in time.  Is active and does enjoy working out, as well.      She also does have an underlying diagnosis of rheumatoid arthritis.  Was diagnosed as juvenile.  She is on Enbrel for treatment.       PAST MEDICAL HISTORY:  The patient's health history form dated 05/07/19 was reviewed and signed.  Past medical history, medications, allergies, surgical history, social history,  "family history, and review of systems noted and scanned into EMR.  ALLERGIES:  No known medication allergies.     PAST MEDICAL HISTORY:    Juvenile Rheumatoid Arthritis  Depression  ADD  IBS    PAST SURGICAL HISTORY:    LASIK  Tooth Extraction x6    FAMILY HISTORY:    Mother:  Total Hysterectomy    SOCIAL HISTORY:   Marital Status:  Single  Occupation: Scribe/Jhonny  Alcohol Use:  Yes, occasional  Tobacco Use: Former  Secondhand Smoke Exposure:  No  History HIV or Hepatitis:  No    PHYSICAL EXAMINATION:    On physical exam, the patient's height 5'5\", weight 140 pounds, pulse and blood pressure not recorded.  She is alert and oriented x3, cooperative on my exam, in no acute distress.  Does ambulate with a satisfactory gait pattern.  Affect is appropriate.  Examination of the left knee shows range of motion 0-125.  Tenderness across the anteromedial aspect overlying the MPFL.  Appears to have a slight catch as we go through flexion and extension.  No pain with palpation across the medial or lateral joint line.  Stable to varus and valgus stress, as well as anterior and posterior drawer testing.  Does appear to have reasonable laxity to the lateral retinaculum at this point in time.  Patellofemoral tracking is acceptable.  No significant joint effusion seen.  Quad strength is otherwise 5/5.     Left foot exam shows tenderness across the base of the fifth metatarsal.  Peroneal tendon function appears to be intact.  Posterior tib function is intact here, as well.  Light range of motion is tolerable.  Very well at the foot, itself.  A little bit of pain with midfoot rotation.  Neurovascular examination is otherwise without difficulty.  Dorsalis pedis pulse otherwise 2+.      X-RAY:  X-rays are reviewed three views left foot.  Shows a healed fifth metatarsal base fracture, slight decreased overall joint spacing with regard to cartilage here with the articulation of the TMT joint on the fifth metatarsal.     X-rays three " views left knee are also reviewed.  Does show overall proper alignment.  Kneecap is well-seated.      ASSESSMENT:    IMPRESSION:         1.  Left knee pain, possible loose body, cartilaginous secondary to kneecap subluxation.        2.  Left foot fifth metatarsal fracture healed.      PLAN:   As far as the knee is concerned, given the mechanical based symptoms, advised MRI evaluation to evaluate for loose body.  The patient will be contacted once that study is complete.    As far as the foot advised proper shoe wear selection.  Symptoms should continue to get better here with time.  I do think most of this is just remodeling of the bone and continued pain overlying that site of her prior fracture that should only improve with time.      Dictated by:  Rd Singh MD  Copy to:  Cris Friedman MD     D:  05/07/19  T:  05/08/19    Typed and/or reviewed and corrected by signing  below, and sent to the Physician for final review and signature.      This report was created using voice recording software and computer-generated templates. Although every effort has been made to review for and eliminate errors, some errors may still occur.         Electronically signed by Magali Fitzpatrick on 05/08/2019 at 3:44 PM    Electronically signed by Rd Singh MD on 05/08/2019 at 4:11 PM  ________________________________________________________________________

## 2019-05-21 ENCOUNTER — HOSPITAL ENCOUNTER (OUTPATIENT)
Dept: MRI IMAGING | Facility: OTHER | Age: 29
Discharge: HOME | End: 2019-05-21
Attending: ORTHOPAEDIC SURGERY | Admitting: ORTHOPAEDIC SURGERY
Payer: COMMERCIAL

## 2019-05-21 DIAGNOSIS — M25.562 ACUTE PAIN OF LEFT KNEE: ICD-10-CM

## 2019-05-21 PROCEDURE — 73721 MRI JNT OF LWR EXTRE W/O DYE: CPT | Mod: LT

## 2019-05-29 ENCOUNTER — TRANSFERRED RECORDS (OUTPATIENT)
Dept: HEALTH INFORMATION MANAGEMENT | Facility: OTHER | Age: 29
End: 2019-05-29

## 2019-05-29 DIAGNOSIS — M22.8X2 PATELLAR TRACKING DISORDER OF LEFT KNEE: ICD-10-CM

## 2019-05-29 DIAGNOSIS — M22.42 CHONDROMALACIA OF PATELLA, LEFT: Primary | ICD-10-CM

## 2019-05-29 DIAGNOSIS — M22.2X2 PATELLOFEMORAL PAIN SYNDROME OF LEFT KNEE: ICD-10-CM

## 2019-06-18 ENCOUNTER — HOSPITAL ENCOUNTER (OUTPATIENT)
Dept: PHYSICAL THERAPY | Facility: OTHER | Age: 29
Setting detail: THERAPIES SERIES
End: 2019-06-18
Attending: ORTHOPAEDIC SURGERY
Payer: COMMERCIAL

## 2019-06-18 DIAGNOSIS — M22.2X2 PATELLOFEMORAL PAIN SYNDROME OF LEFT KNEE: ICD-10-CM

## 2019-06-18 DIAGNOSIS — M22.8X2 PATELLAR TRACKING DISORDER OF LEFT KNEE: ICD-10-CM

## 2019-06-18 DIAGNOSIS — M22.42 CHONDROMALACIA OF PATELLA, LEFT: ICD-10-CM

## 2019-06-18 PROCEDURE — 97110 THERAPEUTIC EXERCISES: CPT | Mod: GP

## 2019-06-18 PROCEDURE — 97161 PT EVAL LOW COMPLEX 20 MIN: CPT | Mod: GP

## 2019-06-18 NOTE — PROGRESS NOTES
06/18/19 1300   General Information   Type of Visit Initial OP Ortho PT Evaluation   Start of Care Date 06/18/19   Referring Physician Dr. Singh   Patient/Family Goals Statement decrease knee pain   Orders Evaluate and Treat   Orders Comment VMO strengthening   Certification Required? No   Medical Diagnosis chondromalacia, patellofemoral pain, patellar tracking disorder of left knee   Surgical/Medical history reviewed Yes  (RA, depression)   Body Part(s)   Body Part(s) Knee   Presentation and Etiology   Pertinent history of current problem (include personal factors and/or comorbidities that impact the POC) Hx of left knee pain, potentially related breaking her left foot and being in a walking boot last July. Noticed the pain initially when going down for receiving in volleyball. Now is with any knee bending. Is more on the inside of the kneecap. Also has RA but is a different type of pain.   Impairments A. Pain;M. Locking or catching   Functional Limitations perform activities of daily living;perform desired leisure / sports activities   Symptom Location medial kneecap   How/Where did it occur During recreation/sports   Onset date of current episode/exacerbation 01/01/19   Chronicity New   Pain rating (0-10 point scale) Best (/10);Worst (/10)   Best (/10) 0/10   Worst (/10) 3/10   Pain quality C. Aching;H. Other  (catching)   Frequency of pain/symptoms C. With activity   Pain/symptoms are: The same all the time   Pain/symptoms exacerbated by B. Walking;C. Lifting;G. Certain positions   Pain/symptoms eased by A. Sitting;C. Rest   Progression of symptoms since onset: Unchanged   Current / Previous Interventions   Diagnostic Tests: MRI   MRI Results Results   MRI results medial compartment and patellar chondromalacia   Prior Level of Function   Prior Level of Function-Mobility Independent   Current Level of Function   Patient role/employment history A. Employed   Employment Comments  at [employer]    Living environment [place]   Home/community accessibility 1 step   Fall Risk Screen   Fall screen completed by PT   Have you fallen 2 or more times in the past year? No   Have you fallen and had an injury in the past year? No   Abuse Screen (yes response referral indicated)   Feels Unsafe at Home or Work/School no   Feels Threatened by Someone no   Does Anyone Try to Keep You From Having Contact with Others or Doing Things Outside Your Home? no   Physical Signs of Abuse Present no   Knee Objective Findings   Side (if bilateral, select both right and left) Left   Observation patella deviates laterally with squatting; pain unchanged with foot positions during squatting.   Knee/Hip Strength Comments uses hip flexors over gluteals for hip abd   Anterior Drawer Test -   Posterior Drawer Test -   Varus Stress Test -   Valgus Stress Test -   Herber's Test -   Apley's Test -   Knee Special Test Comments patellar compression -   Palpation non tender   Accessory Motion/Joint Mobility mild restriction medial patellar glide and tilt   Left Knee Extension AROM full   Left Knee Flexion AROM 103 before pain   Left Knee Flexion PROM 133   Left Knee Flexion Strength 5-/5   Left Knee Extension Strength 5-/5   Left Hip Abduction Strength 4+/5   L VMO Strength 4+/5   Left ITB Flexibility mild restricted   Planned Therapy Interventions   Planned Therapy Interventions joint mobilization;manual therapy;neuromuscular re-education;ROM;strengthening;stretching   Planned Modality Interventions   Planned Modality Interventions Biofeedback;Cryotherapy;Hot packs;Ultrasound   Clinical Impression   Criteria for Skilled Therapeutic Interventions Met yes, treatment indicated   PT Diagnosis impaired mobility   Influenced by the following impairments joint mobility and patellofemoral mechanics; lower extremity strength   Functional limitations due to impairments squatting, exercise, sitting down, stairs   Clinical Presentation  Stable/Uncomplicated   Clinical Decision Making (Complexity) Low complexity   Therapy Frequency 2 times/Week   Predicted Duration of Therapy Intervention (days/wks) 3 months   Risk & Benefits of therapy have been explained Yes   Patient, Family & other staff in agreement with plan of care Yes   Clinical Impression Comments Pt demonstrates altered patellar tracking and mechanics caused by soft tissue and strength impairments. Pt will benefit from additional services to address limitations.   Education Assessment   Preferred Learning Style Listening;Reading;Demonstration;Pictures/video   Barriers to Learning No barriers   ORTHO GOALS   PT Ortho Eval Goals 1;2;3   Ortho Goal 1   Goal Identifier strength   Goal Description Pt will have improved gluteal strength to 5/5 to allow squatting with appropriate mechanics and pain less than 2/10.   Target Date 08/13/19   Ortho Goal 2   Goal Identifier Volleyball   Goal Description Pt will have improved knee mechanics to allow return to volleyball activities with minimal pain increase.   Target Date 08/13/19   Ortho Goal 3   Goal Identifier HEP   Goal Description Pt will be independent and consistent with performance of a customized home exercise program.   Target Date 09/02/19   Total Evaluation Time   PT Eval, Low Complexity Minutes (12203) 30

## 2019-07-03 ENCOUNTER — HOSPITAL ENCOUNTER (OUTPATIENT)
Dept: PHYSICAL THERAPY | Facility: OTHER | Age: 29
Setting detail: THERAPIES SERIES
End: 2019-07-03
Attending: ORTHOPAEDIC SURGERY
Payer: COMMERCIAL

## 2019-07-03 PROCEDURE — 97530 THERAPEUTIC ACTIVITIES: CPT | Mod: GP

## 2019-07-03 PROCEDURE — 97110 THERAPEUTIC EXERCISES: CPT | Mod: GP

## 2019-07-10 ENCOUNTER — HOSPITAL ENCOUNTER (OUTPATIENT)
Dept: PHYSICAL THERAPY | Facility: OTHER | Age: 29
Setting detail: THERAPIES SERIES
End: 2019-07-10
Attending: ORTHOPAEDIC SURGERY
Payer: COMMERCIAL

## 2019-07-10 PROCEDURE — 97530 THERAPEUTIC ACTIVITIES: CPT | Mod: GP

## 2019-07-10 PROCEDURE — 97110 THERAPEUTIC EXERCISES: CPT | Mod: GP

## 2019-07-15 ENCOUNTER — OFFICE VISIT (OUTPATIENT)
Dept: FAMILY MEDICINE | Facility: OTHER | Age: 29
End: 2019-07-15
Attending: PHYSICIAN ASSISTANT
Payer: COMMERCIAL

## 2019-07-15 VITALS
BODY MASS INDEX: 23.84 KG/M2 | HEIGHT: 65 IN | TEMPERATURE: 98.4 F | OXYGEN SATURATION: 98 % | RESPIRATION RATE: 20 BRPM | HEART RATE: 92 BPM | SYSTOLIC BLOOD PRESSURE: 132 MMHG | WEIGHT: 143.1 LBS | DIASTOLIC BLOOD PRESSURE: 76 MMHG

## 2019-07-15 DIAGNOSIS — J06.9 VIRAL URI WITH COUGH: ICD-10-CM

## 2019-07-15 DIAGNOSIS — J02.9 ACUTE VIRAL PHARYNGITIS: Primary | ICD-10-CM

## 2019-07-15 PROCEDURE — 99213 OFFICE O/P EST LOW 20 MIN: CPT | Performed by: NURSE PRACTITIONER

## 2019-07-15 RX ORDER — CODEINE PHOSPHATE AND GUAIFENESIN 10; 100 MG/5ML; MG/5ML
1-2 SOLUTION ORAL EVERY 6 HOURS PRN
Qty: 180 ML | Refills: 0 | Status: SHIPPED | OUTPATIENT
Start: 2019-07-15 | End: 2019-12-19

## 2019-07-15 RX ORDER — POLYMYXIN B SULFATE AND TRIMETHOPRIM 1; 10000 MG/ML; [USP'U]/ML
SOLUTION OPHTHALMIC
Refills: 0 | COMMUNITY
Start: 2019-05-26 | End: 2019-10-15

## 2019-07-15 ASSESSMENT — MIFFLIN-ST. JEOR: SCORE: 1384.98

## 2019-07-15 NOTE — NURSING NOTE
Patient presents to the clinic for sore throat x 1 week. Patient reports having a cough and sinus drainage previously. She also says her left eye was red and crusty this morning. She has taken delsym cough medicine, cough drops and eye ointment for treatment.  Medication Reconciliation: complete    Yessi Marin, CMA

## 2019-07-15 NOTE — PROGRESS NOTES
HPI:    Nikky Singletary is a 28 year old female  who presents to clinic today for sore throat.    Sore throat for a week.  Mild pain with swallowing.  Painful to talk.  Laryngitis for the past 2 days.    Bilateral tonsil stones the past couple of days.   Mild tenderness over cervical lymph nodes.  Cough for about 5 days.  Cough is both dry and productive.  No chest congestion.  No chest tightness.  No chest heaviness.  Feeling mildly winded with coughing and activity.  Runny and stuffy nose, mild post nasal drainage for a couple of months due to allergies.  Decreased energy the past few days.  Appetite decreased.  Drinking fluids well.  Slight headache this morning, subsided.  No body aches.    Left eye with redness, minimal irritated feeling and mild crusting this morning, not glued shut.  Using some previous Polytrim eye ointment.     Taking Delsym cough.  Taking cough drops.  Switched from Allegra to Zyrtec to Claritin D for allergies.    No tylenol or ibuprofen.    On Enbrel for RA.  Normally takes Sulindac but as hasn't the past few days.  Non smoker.  No asthma.        Past Medical History:   Diagnosis Date     Acne, mild 11/18/2010     HEATH positive 7/11/2017     Attention deficit hyperactivity disorder (ADHD), combined type 2/1/2017    Overview:  Diagnosed in elementary age. Used some medications but cannot recall what at that time.      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 Followed at [facility] as a child.  Sees Dr Farmer twice a year     IBS (irritable bowel syndrome) 6/10/2015     Juvenile rheumatoid arthritis (H) 06/09/2014    Diagnosed at age 3 years, followed by [facility] as a child.  Follows Dr Farmer twice a year.     Myopia 3/23/2005     Tendinitis of extensor tendon of right hand 6/12/2018     Tendinitis of right elbow 2/1/2017     Varicella without complication     As a child     Past Surgical History:  "  Procedure Laterality Date     EXTRACTION(S) DENTAL       Social History     Tobacco Use     Smoking status: Never Smoker     Smokeless tobacco: Former User     Types: Chew     Tobacco comment: Quit smoking: Rarely, with driving, roomate smokes   Substance Use Topics     Alcohol use: Yes     Alcohol/week: 0.0 oz     Comment: Alcoholic Drinks/day: occasional     Current Outpatient Medications   Medication Sig Dispense Refill     buPROPion (WELLBUTRIN SR) 150 MG 12 hr tablet Take 1 tablet (150 mg) by mouth every morning 90 tablet 3     Etanercept (ENBREL SURECLICK) 50 MG/ML autoinjector Inject 50 mg Subcutaneous       norgestimate-ethinyl estradiol (SPRINTEC 28) 0.25-35 MG-MCG tablet TAKE 1 TABLET BY MOUTH ONCE DAILY. 84 tablet 4     sulindac (CLINORIL) 200 MG tablet Take 1 tablet (200 mg) by mouth 2 times daily (with meals) 180 tablet 3     trimethoprim-polymyxin b (POLYTRIM) 96420-8.1 UNIT/ML-% ophthalmic solution INT 1 GTT IN THE OD QID FOR 1 WEEK  0     No Known Allergies      Past medical history, past surgical history, current medications and allergies reviewed and accurate to the best of my knowledge.        ROS:  Refer to HPI    /76 (BP Location: Right arm, Patient Position: Sitting, Cuff Size: Adult Regular)   Pulse 92   Temp 98.4  F (36.9  C) (Tympanic)   Resp 20   Ht 1.651 m (5' 5\")   Wt 64.9 kg (143 lb 1.6 oz)   SpO2 98%   Breastfeeding? No   BMI 23.81 kg/m      EXAM:  General Appearance: Well appearing adult female, appropriate appearance for age. No acute distress  Head: normocephalic, atraumatic  Ears: Left TM grey, translucent with bony landmarks appreciated, no erythema, no effusion, no bulging, no purulence.  Right TM grey, translucent with bony landmarks appreciated, no erythema, no effusion, no bulging, no purulence.  Left auditory canal clear.  Right auditory canal clear.  Normal external ears, non tender.  Eyes: conjunctivae normal without erythema or irritation, no drainage or " crusting, no eyelid swelling, pupils equal   Orophayrnx: moist mucous membranes, posterior pharynx with mild erythema and mild irritation, tonsils without hypertrophy, no erythema, no exudates or petechiae, no post nasal drip seen, no trismus, voice clear.    Sinuses:  No sinus tenderness upon palpation of the frontal or maxillary sinuses  Nose: no drainage or congestion noted  Neck: Mild bilateral tonsillar and anterior cervical lymph nodes with enlargement and tenderness to palpation, no posterior cervical lymph node enlargement.  Respiratory: normal chest wall and respirations.  Normal effort.  Clear to auscultation bilaterally, no wheezing, crackles or rhonchi.  No increased work of breathing.  Occasional mild dry cough appreciated, oxygen saturation 98%  Cardiac: RRR with no murmurs  Musculoskeletal:  Normal gait.  Equal movement of bilateral upper extremities.  Equal movement of bilateral lower extremities.    Psychological: normal affect, alert and pleasant      Labs:  Not clinically indicated    Xray:  Not clinically indicated       ASSESSMENT/PLAN:    ICD-10-CM    1. Acute viral pharyngitis J02.8     B97.89    2. Viral URI with cough J06.9 guaiFENesin-codeine (ROBITUSSIN AC) 100-10 MG/5ML solution    B97.89          Discussed at length with patient viral vs bacterial respiratory illness, and evidence based practice and guidelines for treatment with antibiotics.    Discussed with patient that symptoms and exam are consistent with viral illness.      Patient states that Tessalon pearles are not effective for her.   Robitussin with codeine 5 to 10 ml Q 6 hours PRN cough    Continue Claritin D as needed for allergies    Resume Sulindac or take Ibuprofen for inflammation and pain     Tylenol 2 tabs every 6 hours as needed     Symptomatic treatment - Encouraged fluids, salt water gargles, honey, elevation, humidifier, sinus rinse/netti pot, lozenges, topical vicks vapor rub, etc     Discussed warning  signs/symptoms indicative of need to f/u    Follow up if symptoms persist or worsen or concerns

## 2019-07-15 NOTE — LETTER
[facility]  [address]  [place] [address]  Phone: [phone]  Fax: [phone]    July 15, 2019        Nikky Singletary  [address] [facility] [address]          To whom it may concern:    RE: Nikky Sher was seen and treated today at our clinic and missed work on 7/15/19.    Nikky is able to return to work on 7/16/19 without restrictions.    Please contact me for questions or concerns.      Sincerely,        Rin Philippe, NP

## 2019-07-15 NOTE — PATIENT INSTRUCTIONS
Robitussin with codeine every 6 hours as needed for cough    Honey - take on spoon or mix in warm tea    Cough drops    Continue Claritin D as needed for allergies    May try topical vicks rub on chest for cough    Drink plenty of fluids, especially water    Ibuprofen or Sulindac     Tylenol 2 tabs every 6 hours as needed     Follow up if worsening or concerns

## 2019-07-23 ENCOUNTER — HOSPITAL ENCOUNTER (OUTPATIENT)
Dept: PHYSICAL THERAPY | Facility: OTHER | Age: 29
Setting detail: THERAPIES SERIES
End: 2019-07-23
Attending: ORTHOPAEDIC SURGERY
Payer: COMMERCIAL

## 2019-07-23 PROCEDURE — 97530 THERAPEUTIC ACTIVITIES: CPT | Mod: GP

## 2019-07-23 PROCEDURE — 97110 THERAPEUTIC EXERCISES: CPT | Mod: GP

## 2019-07-29 ENCOUNTER — HOSPITAL ENCOUNTER (OUTPATIENT)
Dept: PHYSICAL THERAPY | Facility: OTHER | Age: 29
Setting detail: THERAPIES SERIES
End: 2019-07-29
Attending: ORTHOPAEDIC SURGERY
Payer: COMMERCIAL

## 2019-07-29 PROCEDURE — 97110 THERAPEUTIC EXERCISES: CPT | Mod: GP

## 2019-07-29 PROCEDURE — 97530 THERAPEUTIC ACTIVITIES: CPT | Mod: GP

## 2019-08-06 ENCOUNTER — HOSPITAL ENCOUNTER (OUTPATIENT)
Dept: PHYSICAL THERAPY | Facility: OTHER | Age: 29
Setting detail: THERAPIES SERIES
End: 2019-08-06
Attending: ORTHOPAEDIC SURGERY
Payer: COMMERCIAL

## 2019-08-06 PROCEDURE — 97110 THERAPEUTIC EXERCISES: CPT | Mod: GP

## 2019-08-13 ENCOUNTER — HOSPITAL ENCOUNTER (OUTPATIENT)
Dept: PHYSICAL THERAPY | Facility: OTHER | Age: 29
Setting detail: THERAPIES SERIES
End: 2019-08-13
Attending: ORTHOPAEDIC SURGERY
Payer: COMMERCIAL

## 2019-08-13 PROCEDURE — 97530 THERAPEUTIC ACTIVITIES: CPT | Mod: GP

## 2019-08-13 PROCEDURE — 97110 THERAPEUTIC EXERCISES: CPT | Mod: GP

## 2019-08-13 PROCEDURE — 97112 NEUROMUSCULAR REEDUCATION: CPT | Mod: GP

## 2019-08-15 DIAGNOSIS — F90.2 ATTENTION DEFICIT HYPERACTIVITY DISORDER (ADHD), COMBINED TYPE: ICD-10-CM

## 2019-08-15 RX ORDER — BUPROPION HYDROCHLORIDE 150 MG/1
TABLET, EXTENDED RELEASE ORAL
Qty: 90 TABLET | Refills: 0 | Status: SHIPPED | OUTPATIENT
Start: 2019-08-15 | End: 2019-10-23

## 2019-08-15 NOTE — TELEPHONE ENCOUNTER
"Requested Prescriptions   Pending Prescriptions Disp Refills     buPROPion (WELLBUTRIN SR) 150 MG 12 hr tablet [Pharmacy Med Name: BUPROPION SR 150MG TABLETS (12 H)] 90 tablet 0     Sig: TAKE 1 TABLET(150 MG) BY MOUTH EVERY MORNING       SSRIs Protocol Passed - 8/15/2019  7:43 AM        Passed - Recent (12 mo) or future (30 days) visit within the authorizing provider's specialty     Patient had office visit in the last 12 months or has a visit in the next 30 days with authorizing provider or within the authorizing provider's specialty.  See \"Patient Info\" tab in inbasket, or \"Choose Columns\" in Meds & Orders section of the refill encounter.              Passed - Medication is Bupropion     If the medication is Bupropion (Wellbutrin), and the patient is taking for smoking cessation; OK to refill.          Passed - Medication is active on med list        Passed - Patient is age 18 or older        Passed - No active pregnancy on record        Passed - No positive pregnancy test in last 12 months        LOV 9/5/18  Prescription approved per [facility] [place] [place] Refill Protocol.  Brenda J. Goodell, RN on 8/15/2019 at 11:24 AM    "

## 2019-08-21 ENCOUNTER — HOSPITAL ENCOUNTER (OUTPATIENT)
Dept: PHYSICAL THERAPY | Facility: OTHER | Age: 29
Setting detail: THERAPIES SERIES
End: 2019-08-21
Attending: ORTHOPAEDIC SURGERY
Payer: COMMERCIAL

## 2019-08-21 PROCEDURE — 97112 NEUROMUSCULAR REEDUCATION: CPT | Mod: GP

## 2019-08-21 PROCEDURE — 97110 THERAPEUTIC EXERCISES: CPT | Mod: GP

## 2019-08-28 ENCOUNTER — HOSPITAL ENCOUNTER (OUTPATIENT)
Dept: PHYSICAL THERAPY | Facility: OTHER | Age: 29
Setting detail: THERAPIES SERIES
End: 2019-08-28
Attending: ORTHOPAEDIC SURGERY
Payer: COMMERCIAL

## 2019-08-28 PROCEDURE — 97110 THERAPEUTIC EXERCISES: CPT | Mod: GP

## 2019-08-28 NOTE — PROGRESS NOTES
Outpatient Physical Therapy Progress Note     Patient: Nikky Singletary  : 1990    Beginning/End Dates of Reporting Period:  19 to 2019    Referring Provider: Dr. Singh    Therapy Diagnosis: impaired mobility     Client Self Report: Nikky reports not much new since last week. Was really sore yesterday but might have been the weather. Rates pain today 3/10.     Objective Measurements:  Objective Measure: pelvic symmetry  Details: FFT neg; leg length equal, equal pubes and pelvis                                    Outcome Measures (most recent score):  NT    Goals:  Goal Identifier strength   Goal Description Pt will have improved gluteal strength to 5/5 to allow squatting with appropriate mechanics and pain less than 2/10.   Target Date 19   Date Met      Progress: Improved strength and squatting mechanics but minimal change in pain.     Goal Identifier Volleyball   Goal Description Pt will have improved knee mechanics to allow return to volleyball activities with minimal pain increase.   Target Date 19   Date Met      Progress: Improved knee mechanics but minimal change in pain.     Goal Identifier HEP   Goal Description Pt will be independent and consistent with performance of a customized home exercise program.   Target Date 19   Date Met  19   Progress:       Progress Toward Goals:   Progress this reporting period: Pt demonstrates minimal improvement in symptoms with PT despite improved strength and knee mechanics with squatting. Recommend return to orthopedic dr to assess further treatment options.          Plan:  Other: HEP x3 weeks, return to Dr. Singh for further recommendations. Hold chart open until after ortho appt.    Discharge:  No

## 2019-09-18 ENCOUNTER — OFFICE VISIT (OUTPATIENT)
Dept: ORTHOPEDICS | Facility: OTHER | Age: 29
End: 2019-09-18
Attending: ORTHOPAEDIC SURGERY
Payer: COMMERCIAL

## 2019-09-18 DIAGNOSIS — M25.531 RIGHT WRIST PAIN: Primary | ICD-10-CM

## 2019-09-18 PROCEDURE — G0463 HOSPITAL OUTPT CLINIC VISIT: HCPCS

## 2019-09-24 NOTE — PROGRESS NOTES
CHIEF COMPLAINT: Left Knee Pain and Right Wrist Pain    PROBLEMS:   Patient has no noted problems.    PATIENT REPORTED MEDICATIONS:  SULINDAC TABLET (SULINDAC TABS)   BUPROPION HCL TABLET (BUPROPION HCL TABS)   SPRINTEC 28 TABLET (NORGESTIMATE-ETH ESTRADIOL TABS)   ENBREL SURECLICK SOLUTION AUTO-INJECTOR (ETANERCEPT SOAJ)     PATIENT REPORTED ALLERGIES:   Patient has no noted allergies.      HISTORY OF PRESENT ILLNESS:    REASON FOR EVALUATION:  1.  Left knee pain.  2.  Right wrist pain.    HISTORY OF PRESENT ILLNESS:  Nikky comes in.  Her left knee is still symptomatic in spite of therapy.  He does have some underlying patellofemoral chondromalacia and maltracking.  She also gets a little bit of pain on the medial side.  She wants to look at her next steps there.  She also reports the continuation of right wrist pain.  She has seen Dr. Badillo in the past.  I looked at her x-rays and she is ulnar positive.  Most of her pain is on the ulnar aspect.  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  She has done a lot of modifications at her job site.  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  She is right hand dominant.  In spite of all these changes she has not seen a significant improvement there.  She is wondering whether an injection would be warranted or helpful as well.    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  Tightness of the lateral retinaculum.  Mild swelling seen.  Kneecap tracking is slightly off center  as well.  She does have pain on palpation across the plica on the medial side.  Right wrist exam does show pain with TFCC grind test, as well as ulnar grind being positive.  Full range of motion noted.  No instability is seen ligamentously at this time.  She is not painful here with palpation across her lateral epicondyle as well.    ASSESSMENT:    1.  Left knee pain secondary to patellofemoral maltracking, plus plical.  2.  Right wrist pain, possible triangular fibrocartilage complex pathology.    PLAN:   1.  As far as the left knee is concerned, advised that she does consider to move forward with knee arthroscopy, lateral release and patellofemoral chondroplasty, in addition to plical resection.  2.  As far as the right wrist is concerned, an MRI arthrogram as been ordered and recommended for the patient at this time.    Dictated by Rd Singh MD  cc:  MD Dr. Samantha Gutierrez at [facility]    D:  09/18/2019  T:  09/20/2019    Typed and/or reviewed and corrected by signing  below, and sent to the Physician for final review and signature.    This report was created using voice recording software and computer-generated templates. Although every effort has been made to review for and eliminate errors, some errors may still occur.         Electronically signed by Brayan Varela -  on 09/20/2019 at 2:12 PM    Electronically signed by Rd Singh MD on 09/21/2019 at 8:23 AM  ________________________________________________________________________

## 2019-10-10 ENCOUNTER — OFFICE VISIT (OUTPATIENT)
Dept: FAMILY MEDICINE | Facility: OTHER | Age: 29
End: 2019-10-10
Attending: NURSE PRACTITIONER
Payer: COMMERCIAL

## 2019-10-10 VITALS
WEIGHT: 146 LBS | HEIGHT: 65 IN | TEMPERATURE: 97.9 F | HEART RATE: 85 BPM | BODY MASS INDEX: 24.32 KG/M2 | OXYGEN SATURATION: 99 % | SYSTOLIC BLOOD PRESSURE: 126 MMHG | DIASTOLIC BLOOD PRESSURE: 78 MMHG | RESPIRATION RATE: 16 BRPM

## 2019-10-10 DIAGNOSIS — B97.89 VIRAL SINUSITIS: Primary | ICD-10-CM

## 2019-10-10 DIAGNOSIS — J32.9 VIRAL SINUSITIS: Primary | ICD-10-CM

## 2019-10-10 PROCEDURE — 99213 OFFICE O/P EST LOW 20 MIN: CPT | Performed by: NURSE PRACTITIONER

## 2019-10-10 ASSESSMENT — MIFFLIN-ST. JEOR: SCORE: 1398.13

## 2019-10-10 ASSESSMENT — PAIN SCALES - GENERAL: PAINLEVEL: SEVERE PAIN (6)

## 2019-10-10 NOTE — NURSING NOTE
Patient presents to clinic today for sinus symptoms starting this past Sunday. She states she is experiencing tooth pain/facial pain.     No LMP recorded. (Menstrual status: UNKNOWN).  Medication Reconciliation: complete    Diamond Mcgill LPN  10/10/2019 9:47 AM

## 2019-10-10 NOTE — PATIENT INSTRUCTIONS
Patient Education     Sinusitis (No Antibiotics)    The sinuses are air-filled spaces within the bones of the face. They connect to the inside of the nose. Sinusitis is an inflammation of the tissue that lines the sinuses. Sinusitis can occur during a cold. It can also happen due to allergies to pollens and other particles in the air. It can cause symptoms such as sinus congestion, headache, sore throat, facial swelling, and a feeling of fullness. It may also cause a low-grade fever. Your sinusitis does not include an infection with bacteria. Because of this, antibiotics are not used to treat this problem.  Home care    Drink plenty of water, hot tea, and other liquids. This may help thin nasal mucus. It also may help your sinuses drain fluids.    Heat may help soothe painful areas of your face. Use a towel soaked in hot water. Or,  the shower and direct the warm spray onto your face. Using a vaporizer along with a menthol rub at night may also help soothe symptoms.     An expectorant with guaifenesin may help thin nasal mucus and help your sinuses drain fluids.    You can use an over-the-counter decongestant, unless a similar medicine was prescribed to you. Nasal sprays work the fastest. Use one that contains phenylephrine or oxymetazoline. First blow your nose gently. Then use the spray. Do not use these medicines more often than directed on the label. If you do, your symptoms may get worse. You may also take pills that contain pseudoephedrine. Don t use products that combine multiple medicines. This is because side effects may be increased. Read all medicine labels. You can also ask the pharmacist for help. (People with high blood pressure should not use decongestants. They can raise blood pressure.)    Over-the-counter antihistamines may help if allergies contributed to your sinusitis.      Use acetaminophen or ibuprofen to control pain, unless another pain medicine was prescribed to you. If you have  chronic liver or kidney disease or ever had a stomach ulcer, talk with your healthcare provider before using these medicines. (Aspirin should never be taken by anyone under age 18 who is ill with a fever. It may cause severe liver damage.)    Use nasal rinses or irrigation as instructed by your healthcare provider.    Don't smoke. This can make symptoms worse.  Follow-up care  Follow up with your healthcare provider or our staff if you are NOT better in 1 week.  When to seek medical advice  Call your healthcare provider if any of these occur:    Green or yellow fluid draining from your nose or into your throat    Facial pain or headache that gets worse    Stiff neck    Unusual drowsiness or confusion    Swelling of your forehead or eyelids    Vision problems, such as blurred or double vision    Fever of 100.4 F (38 C) or higher, or as directed by your healthcare provider    Seizure    Breathing problems    Symptoms that don't go away in 10 days  Date Last Reviewed: 11/1/2017 2000-2018 [employer]. [address] [place] [address], [place] [address]. All rights reserved. This information is not intended as a substitute for professional medical care. Always follow your healthcare professional's instructions.

## 2019-10-10 NOTE — PROGRESS NOTES
HPI:    Nikky Singletary is a 28 year old female who presents to clinic today for sinus concerns.  She reports her symptoms started approximately 5 days ago.  She has had mild sore throat, sinus congestion, cough.  She had increased sinus drainage over the last couple days as well as a low-grade temp 2 days ago.  She states over the last 2 days she has had the feeling of her left cheek being swollen, teeth pain and headaches.  She also feels her ears are plugged.  She does not have any history of recurrent sinus infections.  She is taking DayQuil for symptomatic management.    Past Medical History:   Diagnosis Date     Acne, mild 11/18/2010     HEATH positive 7/11/2017     Attention deficit hyperactivity disorder (ADHD), combined type 2/1/2017    Overview:  Diagnosed in elementary age. Used some medications but cannot recall what at that time.      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 Followed at [facility] as a child.  Sees Dr Farmer twice a year     IBS (irritable bowel syndrome) 6/10/2015     Juvenile rheumatoid arthritis (H) 06/09/2014    Diagnosed at age 3 years, followed by [facility] as a child.  Follows Dr Farmer twice a year.     Myopia 3/23/2005     Tendinitis of extensor tendon of right hand 6/12/2018     Tendinitis of right elbow 2/1/2017     Varicella without complication     As a child         Social History     Socioeconomic History     Marital status: Single     Spouse name: Not on file     Number of children: Not on file     Years of education: Not on file     Highest education level: Not on file   Occupational History     Not on file   Social Needs     Financial resource strain: Not on file     Food insecurity:     Worry: Not on file     Inability: Not on file     Transportation needs:     Medical: Not on file     Non-medical: Not on file   Tobacco Use     Smoking status: Never Smoker     Smokeless tobacco:  Former User     Types: Chew     Tobacco comment: Quit smoking: Rarely, with driving, roomate smokes   Substance and Sexual Activity     Alcohol use: Yes     Alcohol/week: 0.0 standard drinks     Comment: Alcoholic Drinks/day: occasional     Drug use: No     Sexual activity: Yes     Partners: Male     Birth control/protection: Pill   Lifestyle     Physical activity:     Days per week: Not on file     Minutes per session: Not on file     Stress: Not on file   Relationships     Social connections:     Talks on phone: Not on file     Gets together: Not on file     Attends Protestant service: Not on file     Active member of club or organization: Not on file     Attends meetings of clubs or organizations: Not on file     Relationship status: Not on file     Intimate partner violence:     Fear of current or ex partner: Not on file     Emotionally abused: Not on file     Physically abused: Not on file     Forced sexual activity: Not on file   Other Topics Concern     Parent/sibling w/ CABG, MI or angioplasty before 65F 55M? Not Asked   Social History Narrative    Single.  Attended [employer] and [employer].  Now working at [employer] doing [employer] work 2014.   Grew up in [place].        Has her own home in [place].     Has new significant other 2/2018. He lives in [place].        Current Outpatient Medications   Medication Sig Dispense Refill     buPROPion (WELLBUTRIN SR) 150 MG 12 hr tablet TAKE 1 TABLET(150 MG) BY MOUTH EVERY MORNING 90 tablet 0     Etanercept (ENBREL SURECLICK) 50 MG/ML autoinjector Inject 50 mg Subcutaneous       guaiFENesin-codeine (ROBITUSSIN AC) 100-10 MG/5ML solution Take 5-10 mLs by mouth every 6 hours as needed for cough 180 mL 0     norgestimate-ethinyl estradiol (SPRINTEC 28) 0.25-35 MG-MCG tablet TAKE 1 TABLET BY MOUTH ONCE DAILY. 84 tablet 4     sulindac (CLINORIL) 200 MG tablet Take 1 tablet (200 mg) by mouth 2 times daily (with meals) 180 tablet 3     trimethoprim-polymyxin b (POLYTRIM)  "21628-2.1 UNIT/ML-% ophthalmic solution INT 1 GTT IN THE OD QID FOR 1 WEEK  0       No Known Allergies    ROS:  Pertinent positives and negatives are noted in HPI.    EXAM:  /78 (BP Location: Right arm, Patient Position: Sitting, Cuff Size: Adult Regular)   Pulse 85   Temp 97.9  F (36.6  C) (Tympanic)   Resp 16   Ht 1.651 m (5' 5\")   Wt 66.2 kg (146 lb)   LMP  (LMP Unknown)   SpO2 99%   Breastfeeding? No   BMI 24.30 kg/m    General appearance: well appearing female, in no acute distress  Head: normocephalic, atraumatic, left maxillary sinus tenderness with palpation  Ears: TM's with cone of light, no erythema, canals clear bilaterally  Eyes: conjunctivae normal  Oropharynx: moist mucous membranes, tonsils without erythema, exudates or petechiae, no post nasal drip seen  Nose: Stringy discharge noted in nares  Neck: supple without adenopathy  Respiratory: clear to auscultation bilaterally  Cardiac: RRR with no murmurs  Psychological: normal affect, alert and pleasant    ASSESSMENT AND PLAN:    1. Viral sinusitis    Symptoms and exam are consistent with viral sinusitis.  Recommend Flonase nasal spray as well as increase fluids, warm packs and over-the-counter cough and cold medications.  Discussed symptomatic management as well as signs and symptoms that would warrant follow-up.  She does have her preop scheduled for the middle of next week.  This would be a good time to have her follow-up to make sure symptoms are improving.  No antibiotics are warranted at this time.        GAGAN Solomon CNP..................10/10/2019 9:45 AM      This document was prepared using voice generated software.  While every attempt was made for accuracy, grammatical errors may exist.  "

## 2019-10-15 ENCOUNTER — OFFICE VISIT (OUTPATIENT)
Dept: FAMILY MEDICINE | Facility: OTHER | Age: 29
End: 2019-10-15
Attending: FAMILY MEDICINE
Payer: COMMERCIAL

## 2019-10-15 VITALS
OXYGEN SATURATION: 99 % | DIASTOLIC BLOOD PRESSURE: 66 MMHG | HEIGHT: 66 IN | RESPIRATION RATE: 16 BRPM | SYSTOLIC BLOOD PRESSURE: 116 MMHG | HEART RATE: 74 BPM | BODY MASS INDEX: 23.33 KG/M2 | WEIGHT: 145.2 LBS | TEMPERATURE: 98.1 F

## 2019-10-15 DIAGNOSIS — M08.3 CHRONIC POLYARTICULAR JUVENILE RHEUMATOID ARTHRITIS (H): ICD-10-CM

## 2019-10-15 DIAGNOSIS — Z01.818 PREOP GENERAL PHYSICAL EXAM: Primary | ICD-10-CM

## 2019-10-15 DIAGNOSIS — Z79.899 HIGH RISK MEDICATION USE: ICD-10-CM

## 2019-10-15 PROBLEM — G89.29 CHRONIC PAIN OF LEFT KNEE: Status: ACTIVE | Noted: 2019-10-15

## 2019-10-15 PROBLEM — M25.562 CHRONIC PAIN OF LEFT KNEE: Status: ACTIVE | Noted: 2019-10-15

## 2019-10-15 LAB
ANION GAP SERPL CALCULATED.3IONS-SCNC: 8 MMOL/L (ref 3–14)
BASOPHILS # BLD AUTO: 0 10E9/L (ref 0–0.2)
BASOPHILS NFR BLD AUTO: 0.8 %
BUN SERPL-MCNC: 11 MG/DL (ref 7–25)
CALCIUM SERPL-MCNC: 9.2 MG/DL (ref 8.6–10.3)
CHLORIDE SERPL-SCNC: 103 MMOL/L (ref 98–107)
CO2 SERPL-SCNC: 27 MMOL/L (ref 21–31)
CREAT SERPL-MCNC: 0.69 MG/DL (ref 0.6–1.2)
DIFFERENTIAL METHOD BLD: NORMAL
EOSINOPHIL # BLD AUTO: 0.1 10E9/L (ref 0–0.7)
EOSINOPHIL NFR BLD AUTO: 2.3 %
ERYTHROCYTE [DISTWIDTH] IN BLOOD BY AUTOMATED COUNT: 12.2 % (ref 10–15)
GFR SERPL CREATININE-BSD FRML MDRD: >90 ML/MIN/{1.73_M2}
GLUCOSE SERPL-MCNC: 84 MG/DL (ref 70–105)
HCG UR QL: NEGATIVE
HCT VFR BLD AUTO: 41.4 % (ref 35–47)
HGB BLD-MCNC: 13.5 G/DL (ref 11.7–15.7)
IMM GRANULOCYTES # BLD: 0 10E9/L (ref 0–0.4)
IMM GRANULOCYTES NFR BLD: 0 %
LYMPHOCYTES # BLD AUTO: 2.4 10E9/L (ref 0.8–5.3)
LYMPHOCYTES NFR BLD AUTO: 46 %
MCH RBC QN AUTO: 29.9 PG (ref 26.5–33)
MCHC RBC AUTO-ENTMCNC: 32.6 G/DL (ref 31.5–36.5)
MCV RBC AUTO: 92 FL (ref 78–100)
MONOCYTES # BLD AUTO: 0.5 10E9/L (ref 0–1.3)
MONOCYTES NFR BLD AUTO: 10.5 %
NEUTROPHILS # BLD AUTO: 2.1 10E9/L (ref 1.6–8.3)
NEUTROPHILS NFR BLD AUTO: 40.4 %
PLATELET # BLD AUTO: 279 10E9/L (ref 150–450)
POTASSIUM SERPL-SCNC: 4.3 MMOL/L (ref 3.5–5.1)
RBC # BLD AUTO: 4.51 10E12/L (ref 3.8–5.2)
SODIUM SERPL-SCNC: 138 MMOL/L (ref 134–144)
WBC # BLD AUTO: 5.2 10E9/L (ref 4–11)

## 2019-10-15 PROCEDURE — 80048 BASIC METABOLIC PNL TOTAL CA: CPT | Mod: ZL | Performed by: FAMILY MEDICINE

## 2019-10-15 PROCEDURE — 36415 COLL VENOUS BLD VENIPUNCTURE: CPT | Mod: ZL | Performed by: FAMILY MEDICINE

## 2019-10-15 PROCEDURE — 85025 COMPLETE CBC W/AUTO DIFF WBC: CPT | Mod: ZL | Performed by: FAMILY MEDICINE

## 2019-10-15 PROCEDURE — 81025 URINE PREGNANCY TEST: CPT | Mod: ZL | Performed by: FAMILY MEDICINE

## 2019-10-15 PROCEDURE — 99214 OFFICE O/P EST MOD 30 MIN: CPT | Performed by: FAMILY MEDICINE

## 2019-10-15 ASSESSMENT — PAIN SCALES - GENERAL: PAINLEVEL: MODERATE PAIN (4)

## 2019-10-15 ASSESSMENT — MIFFLIN-ST. JEOR: SCORE: 1410.37

## 2019-10-15 NOTE — PROGRESS NOTES
[facility]  [address]  [place] [address]  [phone]  Dept: [phone]    PRE-OP EVALUATION:  Today's date: 10/15/2019    Nikky Singletary (: 1990) presents for pre-operative evaluation assessment as requested by Dr. Singh.  She requires evaluation and anesthesia risk assessment prior to undergoing surgery/procedure for treatment of left knee scope .    Proposed Surgery/ Procedure: Left knee scope   Date of Surgery/ Procedure: 10/30/19  Time of Surgery/ Procedure: unknown   Hospital[facility]Surgical Facility: [facility]   Fax number for surgical facility:     Primary Physician: No Ref-Primary, Physician  Type of Anesthesia Anticipated: to be determined    Patient has a Health Care Directive or Living Will:  NO    1. NO - Do you have a history of heart attack, stroke, stent, bypass or surgery on an artery in the head, neck, heart or legs?  2. YES - DO YOU EVER HAVE ANY PAIN OR DISCOMFORT IN YOUR CHEST? Yes when she has been sick.   3. NO - Do you have a history of  Heart Failure?  4. NO - Are you troubled by shortness of breath when: walking on the level, up a slight hill or at night?  5. YES - Do you currently have a cold, bronchitis or other respiratory infection?  6. YES - Do you have a cough, shortness of breath or wheezing?  7. NO - Do you sometimes get pains in the calves of your legs when you walk?  8. Doesn't know - Do you or anyone in your family have previous history of blood clots?  9. NO - Do you or does anyone in your family have a serious bleeding problem such as prolonged bleeding following surgeries or cuts?  10. NO - Have you ever had problems with anemia or been told to take iron pills? Did 10 years ago, but not since.   11. NO - Have you had any abnormal blood loss such as black, tarry or bloody stools, or abnormal vaginal bleeding?  12. NO - Have you ever had a blood transfusion?  13. NO - Have you or any of your relatives  ever had problems with anesthesia?  14. NO - Do you have sleep apnea, excessive snoring or daytime drowsiness?  15. NO - Do you have any prosthetic heart valves?  16. NO - Do you have prosthetic joints?  17. NO - Is there any chance that you may be pregnant?      HPI:     HPI related to upcoming procedure:       See problem list for active medical problems.  Problems all longstanding and stable, except as noted/documented.  See ROS for pertinent symptoms related to these conditions.      MEDICAL HISTORY:     Patient Active Problem List    Diagnosis Date Noted     Chronic pain of left knee 10/15/2019     Priority: Medium     Tendinitis of extensor tendon of right hand 06/12/2018     Priority: Medium     HEATH positive 07/11/2017     Priority: Medium     High risk medication use 07/11/2017     Priority: Medium     Attention deficit hyperactivity disorder (ADHD), combined type 02/01/2017     Priority: Medium     Overview:   Diagnosed in elementary age. Used some medications but cannot recall what at that time.        Tendinitis of right elbow 02/01/2017     Priority: Medium     IBS (irritable bowel syndrome) 06/10/2015     Priority: Medium     Chronic polyarticular juvenile rheumatoid arthritis (H) 06/09/2014     Priority: Medium     Overview:   IMO Update 10/11  Overview:   Diagnosed at age 3 years. Followed at [facility] as a child.   Sees Dr Farmer twice a year       Acne, mild 11/18/2010     Priority: Medium     Myopia 03/23/2005     Priority: Medium      Past Medical History:   Diagnosis Date     Acne, mild 11/18/2010     HEATH positive 7/11/2017     Attention deficit hyperactivity disorder (ADHD), combined type 2/1/2017    Overview:  Diagnosed in elementary age. Used some medications but cannot recall what at that time.      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 Followed at [facility] as a child.  Sees Dr Farmer twice a year      "IBS (irritable bowel syndrome) 6/10/2015     Juvenile rheumatoid arthritis (H) 06/09/2014    Diagnosed at age 3 years, followed by [facility] as a child.  Follows Dr Farmer twice a year.     Myopia 3/23/2005     Tendinitis of extensor tendon of right hand 6/12/2018     Tendinitis of right elbow 2/1/2017     Varicella without complication     As a child     Past Surgical History:   Procedure Laterality Date     EXTRACTION(S) DENTAL       Current Outpatient Medications   Medication Sig Dispense Refill     buPROPion (WELLBUTRIN SR) 150 MG 12 hr tablet TAKE 1 TABLET(150 MG) BY MOUTH EVERY MORNING 90 tablet 0     Etanercept (ENBREL SURECLICK) 50 MG/ML autoinjector Inject 50 mg Subcutaneous       guaiFENesin-codeine (ROBITUSSIN AC) 100-10 MG/5ML solution Take 5-10 mLs by mouth every 6 hours as needed for cough 180 mL 0     norgestimate-ethinyl estradiol (SPRINTEC 28) 0.25-35 MG-MCG tablet TAKE 1 TABLET BY MOUTH ONCE DAILY. 84 tablet 4     sulindac (CLINORIL) 200 MG tablet Take 1 tablet (200 mg) by mouth 2 times daily (with meals) 180 tablet 3     OTC products: None, except as noted above    No Known Allergies   Latex Allergy: NO    Social History     Tobacco Use     Smoking status: Never Smoker     Smokeless tobacco: Former User     Types: Chew     Tobacco comment: Quit smoking: Rarely, with driving, roomate smokes   Substance Use Topics     Alcohol use: Yes     Alcohol/week: 0.0 standard drinks     Comment: Alcoholic Drinks/day: occasional     History   Drug Use No       REVIEW OF SYSTEMS:   CONSTITUTIONAL: NEGATIVE for fever, chills, change in weight  ENT/MOUTH: NEGATIVE for ear, mouth and throat problems  RESP: NEGATIVE for significant cough or SOB  CV: NEGATIVE for chest pain, palpitations or peripheral edema    EXAM:   /66 (BP Location: Right arm, Patient Position: Sitting, Cuff Size: Adult Large)   Pulse 74   Temp 98.1  F (36.7  C) (Tympanic)   Resp 16   Ht 1.676 m (5' 6\")   Wt 65.9 kg (145 lb 3.2 oz)   LMP " 10/09/2019   SpO2 99%   Breastfeeding? No   BMI 23.44 kg/m      GENERAL APPEARANCE: healthy, alert and no distress     EYES: EOMI, PERRL     HENT: ear canals and TM's normal and nose and mouth without ulcers or lesions     NECK: no adenopathy, no asymmetry, masses, or scars and thyroid normal to palpation     RESP: lungs clear to auscultation - no rales, rhonchi or wheezes     CV: regular rates and rhythm, normal S1 S2, no S3 or S4 and no murmur, click or rub     ABDOMEN:  soft, nontender, no HSM or masses and bowel sounds normal     MS: extremities normal- no gross deformities noted, no evidence of inflammation in joints, FROM in all extremities.     SKIN: no suspicious lesions or rashes     NEURO: Normal strength and tone, sensory exam grossly normal, mentation intact and speech normal     PSYCH: mentation appears normal. and affect normal/bright     LYMPHATICS: No cervical adenopathy    DIAGNOSTICS:   No labs or EKG required for low risk surgery (cataract, skin procedure, breast biopsy, etc)    Recent Labs   Lab Test 01/03/18  1311 01/03/18  1250 01/31/17  1325 01/31/17  1304   HGB  --  13.5  --  13.0   PLT  --  274  --  242   CR 0.82  --  0.69*  --       Results for orders placed or performed in visit on 10/15/19   Pregnancy, Urine (HCG)   Result Value Ref Range    HCG Qual Urine Negative NEG^Negative   Basic Metabolic Panel   Result Value Ref Range    Sodium 138 134 - 144 mmol/L    Potassium 4.3 3.5 - 5.1 mmol/L    Chloride 103 98 - 107 mmol/L    Carbon Dioxide 27 21 - 31 mmol/L    Anion Gap 8 3 - 14 mmol/L    Glucose 84 70 - 105 mg/dL    Urea Nitrogen 11 7 - 25 mg/dL    Creatinine 0.69 0.60 - 1.20 mg/dL    GFR Estimate >90 >60 mL/min/[1.73_m2]    GFR Estimate If Black >90 >60 mL/min/[1.73_m2]    Calcium 9.2 8.6 - 10.3 mg/dL   CBC and Differential   Result Value Ref Range    WBC 5.2 4.0 - 11.0 10e9/L    RBC Count 4.51 3.8 - 5.2 10e12/L    Hemoglobin 13.5 11.7 - 15.7 g/dL    Hematocrit 41.4 35.0 - 47.0 %     MCV 92 78 - 100 fl    MCH 29.9 26.5 - 33.0 pg    MCHC 32.6 31.5 - 36.5 g/dL    RDW 12.2 10.0 - 15.0 %    Platelet Count 279 150 - 450 10e9/L    Diff Method Automated Method     % Neutrophils 40.4 %    % Lymphocytes 46.0 %    % Monocytes 10.5 %    % Eosinophils 2.3 %    % Basophils 0.8 %    % Immature Granulocytes 0.0 %    Absolute Neutrophil 2.1 1.6 - 8.3 10e9/L    Absolute Lymphocytes 2.4 0.8 - 5.3 10e9/L    Absolute Monocytes 0.5 0.0 - 1.3 10e9/L    Absolute Eosinophils 0.1 0.0 - 0.7 10e9/L    Absolute Basophils 0.0 0.0 - 0.2 10e9/L    Abs Immature Granulocytes 0.0 0 - 0.4 10e9/L         IMPRESSION:   Reason for surgery/procedure: chronic knee pain  Diagnosis/reason for consult: High risk medication     The proposed surgical procedure is considered LOW risk.    REVISED CARDIAC RISK INDEX  The patient has the following serious cardiovascular risks for perioperative complications such as (MI, PE, VFib and 3  AV Block):  No serious cardiac risks  INTERPRETATION: 0 risks: Class I (very low risk - 0.4% complication rate)    The patient has the following additional risks for perioperative complications:  No identified additional risks      ICD-10-CM    1. Preop general physical exam Z01.818    2. Chronic polyarticular juvenile rheumatoid arthritis (H) M08.3 CBC and Differential     Basic Metabolic Panel     Pregnancy, Urine (HCG)   3. High risk medication use Z79.899        RECOMMENDATIONS:           APPROVAL GIVEN to proceed with proposed procedure, without further diagnostic evaluation       Signed Electronically by: Chance Daly MD    Copy of this evaluation report is provided to requesting physician.    [place] [facility] Preop Guidelines    Revised Cardiac Risk Index

## 2019-10-15 NOTE — NURSING NOTE
"Coming in for a pre-op    Chief Complaint   Patient presents with     Pre-Op Exam     Sanjay Singh, 10/30/2019. Left knee       Initial /66 (BP Location: Right arm, Patient Position: Sitting, Cuff Size: Adult Large)   Pulse 74   Temp 98.1  F (36.7  C) (Tympanic)   Resp 16   Ht 1.676 m (5' 6\")   Wt 65.9 kg (145 lb 3.2 oz)   LMP 10/09/2019   SpO2 99%   Breastfeeding? No   BMI 23.44 kg/m   Estimated body mass index is 23.44 kg/m  as calculated from the following:    Height as of this encounter: 1.676 m (5' 6\").    Weight as of this encounter: 65.9 kg (145 lb 3.2 oz).  Medication Reconciliation: complete    Erin Franco LPN  "

## 2019-10-23 ENCOUNTER — MYC MEDICAL ADVICE (OUTPATIENT)
Dept: FAMILY MEDICINE | Facility: OTHER | Age: 29
End: 2019-10-23

## 2019-10-23 DIAGNOSIS — F90.2 ATTENTION DEFICIT HYPERACTIVITY DISORDER (ADHD), COMBINED TYPE: ICD-10-CM

## 2019-10-23 RX ORDER — BUPROPION HYDROCHLORIDE 150 MG/1
TABLET, EXTENDED RELEASE ORAL
Qty: 90 TABLET | Refills: 3 | Status: SHIPPED | OUTPATIENT
Start: 2019-10-23 | End: 2019-12-19

## 2019-10-23 NOTE — TELEPHONE ENCOUNTER
Patient requesting 3 month fill for Wellbutrin per insurance. Last reorder was 08/15/2019. Refill teed up for you.     Please advise. Thanks!    Ashley Hamm LPN on 10/23/2019 at 10:23 AM

## 2019-10-30 DIAGNOSIS — M25.562 LEFT KNEE PAIN: Primary | ICD-10-CM

## 2019-11-05 ENCOUNTER — OFFICE VISIT (OUTPATIENT)
Dept: ORTHOPEDICS | Facility: OTHER | Age: 29
End: 2019-11-05
Attending: ORTHOPAEDIC SURGERY
Payer: COMMERCIAL

## 2019-11-05 DIAGNOSIS — Z00.00 ROUTINE GENERAL MEDICAL EXAMINATION AT A HEALTH CARE FACILITY: Primary | ICD-10-CM

## 2019-11-05 PROCEDURE — 99024 POSTOP FOLLOW-UP VISIT: CPT | Performed by: ORTHOPAEDIC SURGERY

## 2019-11-11 NOTE — PROGRESS NOTES
CHIEF COMPLAINT: Left Knee Arthroscopy, Lateral Release Postop     PROBLEMS:   Patient has no noted problems.    PATIENT REPORTED MEDICATIONS:  SULINDAC TABLET (SULINDAC TABS)   BUPROPION HCL TABLET (BUPROPION HCL TABS)   SPRINTEC 28 TABLET (NORGESTIMATE-ETH ESTRADIOL TABS)   ENBREL SURECLICK SOLUTION AUTO-INJECTOR (ETANERCEPT SOAJ)     PATIENT REPORTED ALLERGIES:   Patient has no noted allergies.      HISTORY OF PRESENT ILLNESS:    REASON FOR EVALUATION:  Left knee scope, lateral release, patellofemoral debridement.    HISTORY OF PRESENT ILLNESS:  Nikky comes back left knee scope, lateral release, patellofemoral debridement.  Overall doing well, keeping the swelling under control.  Has therapy setup for the 18th.  Is doing desk related duties mostly for her [facility] duties at this time.      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  Incisional site is healing appropriately well.  The sutures were removed and Steri-Strips were applied.  Light range of motion is tolerable at this time from 0 to about 100.      ASSESSMENT:    IMPRESSION:  Left knee scope, lateral release with patellofemoral chondroplasty.     PLAN:   Begin supervised therapy process.  Talked about doing VMO exercises, as well.  Ice, compression, edema control.  See her back in three weeks to assess returnability to full working status at that time.     Dictated by:  Rd Singh MD  Copy to:  Cris  MD Elder     D:  11/05/19  T:  11/11/19    Typed and/or reviewed and corrected by signing  below, and sent to the Physician for final review and signature.      This report was created using voice recording software and computer-generated templates. Although every effort has been made to review for and eliminate errors, some errors may still occur.         Electronically signed by Magali Fitzpatrick on 11/11/2019 at 1:28 PM    Electronically signed by Rd Singh MD on 11/11/2019 at 1:34 PM  ________________________________________________________________________

## 2019-11-18 ENCOUNTER — HOSPITAL ENCOUNTER (OUTPATIENT)
Dept: PHYSICAL THERAPY | Facility: OTHER | Age: 29
Setting detail: THERAPIES SERIES
End: 2019-11-18
Attending: ORTHOPAEDIC SURGERY
Payer: COMMERCIAL

## 2019-11-18 DIAGNOSIS — M25.562 LEFT KNEE PAIN: ICD-10-CM

## 2019-11-18 PROCEDURE — 97110 THERAPEUTIC EXERCISES: CPT | Mod: GP | Performed by: PHYSICAL THERAPIST

## 2019-11-18 PROCEDURE — 97161 PT EVAL LOW COMPLEX 20 MIN: CPT | Mod: GP | Performed by: PHYSICAL THERAPIST

## 2019-11-18 NOTE — PROGRESS NOTES
11/18/19 0700   General Information   Type of Visit Initial OP Ortho PT Evaluation   Start of Care Date 11/18/19   Referring Physician Dr Singh   Patient/Family Goals Statement return to activity   Orders Evaluate and Treat   Date of Order 12/04/19   Certification Required? No   Medical Diagnosis Left knee pain M25.562    Surgical/Medical history reviewed Yes   Precautions/Limitations no known precautions/limitations   Weight-Bearing Status - LLE full weight-bearing   Weight-Bearing Status - RLE full weight-bearing   General Information Comments please refer to pt's medical record for any additional information   Body Part(s)   Body Part(s) Knee   Presentation and Etiology   Pertinent history of current problem (include personal factors and/or comorbidities that impact the POC) Pt underwent a L knee lateral release by Dr Singh on 10/30. States the knee was due to years of wear and tear abdoulaye with volleyball. She is active in both beach and court volleyball   Impairments A. Pain;F. Decreased strength and endurance;G. Impaired balance   Functional Limitations perform required work activities;perform desired leisure / sports activities   Symptom Location L knee   How/Where did it occur With repetition/overuse;During recreation/sports;Other  (wear and tear over the years)   Onset date of current episode/exacerbation 10/30/19  (DOS)   Chronicity New   Pain rating (0-10 point scale) Best (/10);Worst (/10)   Best (/10) 2   Worst (/10) 5   Pain quality C. Aching   Frequency of pain/symptoms A. Constant   Pain/symptoms exacerbated by B. Walking;D. Carrying;G. Certain positions;H. Overhead reach;K. Home tasks;L. Work tasks   Pain/symptoms eased by E. Changing positions;F. Certain positions;C. Rest   Progression of symptoms since onset: Improved   Prior Level of Function   Prior Level of Function-Mobility Ind   Prior Level of Function-ADLs Ind   Current Level of Function   Current Community Support Family/friend caregiver    Patient role/employment history A. Employed   Employment Comments [employer]. She is currently doing desk work and is not walking around much. Her normal duties require her to be on her feet more throughout the day   Living environment House/townhome   Current equipment-Gait/Locomotion None   Current equipment-ADL None   Fall Risk Screen   Fall screen completed by PT   Have you fallen 2 or more times in the past year? No   Have you fallen and had an injury in the past year? No   Is patient a fall risk? No   Abuse Screen (yes response referral indicated)   Feels Unsafe at Home or Work/School no   Feels Threatened by Someone no   Does Anyone Try to Keep You From Having Contact with Others or Doing Things Outside Your Home? no   Physical Signs of Abuse Present no   Functional Scales   Functional Scales Other   Other Scales  PSFS   Knee Objective Findings   Observation pt is pleasant and in no acute distress   Integumentary  some light lingering bruising below the knee joint line   Gait/Locomotion normal gait   Knee ROM Comment full L knee ROM with comparison to uninvolved   Knee/Hip Strength Comments 4/5 MMT at knee and hip on L, all planes; 5/5 on R   Knee Special Test Comments no special tests warranted at this time   Planned Therapy Interventions   Planned Therapy Interventions balance training;joint mobilization;manual therapy;motor coordination training;neuromuscular re-education;ROM;strengthening   Planned Modality Interventions   Planned Modality Interventions Cryotherapy;Electrical stimulation;Hot packs;Ultrasound   Clinical Impression   Criteria for Skilled Therapeutic Interventions Met yes, treatment indicated   PT Diagnosis L knee pain, L knee weakness   Influenced by the following impairments decreased L LE strength and endurance   Functional limitations due to impairments playing volleyball   Clinical Presentation Stable/Uncomplicated   Clinical Decision Making (Complexity) Low complexity    Therapy Frequency 2 times/Week   Predicted Duration of Therapy Intervention (days/wks) 12 weeks   Risk & Benefits of therapy have been explained Yes   Patient, Family & other staff in agreement with plan of care Yes   Education Assessment   Preferred Learning Style Listening;Reading;Demonstration;Pictures/video   Barriers to Learning No barriers   ORTHO GOALS   PT Ortho Eval Goals 1;2;3   Ortho Goal 1   Goal Identifier Pain   Goal Description Pt to report a max pain level of 0/10 throughout the day for improved work and home ADLs.   Target Date 01/13/20   Ortho Goal 2   Goal Identifier Work   Goal Description Pt to return to work and do all her normal work responsibilities without difficulty   Target Date 01/13/20   Ortho Goal 3   Goal Identifier Recreation   Goal Description Pt to return to recreational volleyball without difficulty for improved wellness   Target Date 02/10/20   Total Evaluation Time   PT Christina Low Complexity Minutes (32491) 10

## 2019-11-20 ENCOUNTER — HOSPITAL ENCOUNTER (OUTPATIENT)
Dept: PHYSICAL THERAPY | Facility: OTHER | Age: 29
Setting detail: THERAPIES SERIES
End: 2019-11-20
Attending: ORTHOPAEDIC SURGERY
Payer: COMMERCIAL

## 2019-11-20 PROCEDURE — 97110 THERAPEUTIC EXERCISES: CPT | Mod: GP | Performed by: PHYSICAL THERAPIST

## 2019-11-26 ENCOUNTER — HOSPITAL ENCOUNTER (OUTPATIENT)
Dept: PHYSICAL THERAPY | Facility: OTHER | Age: 29
Setting detail: THERAPIES SERIES
End: 2019-11-26
Attending: ORTHOPAEDIC SURGERY
Payer: COMMERCIAL

## 2019-11-26 ENCOUNTER — OFFICE VISIT (OUTPATIENT)
Dept: ORTHOPEDICS | Facility: OTHER | Age: 29
End: 2019-11-26
Attending: ORTHOPAEDIC SURGERY
Payer: COMMERCIAL

## 2019-11-26 DIAGNOSIS — Z00.00 ROUTINE GENERAL MEDICAL EXAMINATION AT A HEALTH CARE FACILITY: Primary | ICD-10-CM

## 2019-11-26 PROCEDURE — 99024 POSTOP FOLLOW-UP VISIT: CPT | Performed by: ORTHOPAEDIC SURGERY

## 2019-11-26 PROCEDURE — 97110 THERAPEUTIC EXERCISES: CPT | Mod: GP | Performed by: PHYSICAL THERAPIST

## 2019-12-03 ENCOUNTER — HOSPITAL ENCOUNTER (OUTPATIENT)
Dept: PHYSICAL THERAPY | Facility: OTHER | Age: 29
Setting detail: THERAPIES SERIES
End: 2019-12-03
Attending: ORTHOPAEDIC SURGERY
Payer: COMMERCIAL

## 2019-12-03 PROCEDURE — 97110 THERAPEUTIC EXERCISES: CPT | Mod: GP | Performed by: PHYSICAL THERAPIST

## 2019-12-05 ENCOUNTER — HOSPITAL ENCOUNTER (OUTPATIENT)
Dept: PHYSICAL THERAPY | Facility: OTHER | Age: 29
Setting detail: THERAPIES SERIES
End: 2019-12-05
Attending: ORTHOPAEDIC SURGERY
Payer: COMMERCIAL

## 2019-12-05 PROCEDURE — 97110 THERAPEUTIC EXERCISES: CPT | Mod: GP | Performed by: PHYSICAL THERAPIST

## 2019-12-05 NOTE — PROGRESS NOTES
CHIEF COMPLAINT: Left Knee Arthroscopy, Lateral Release Recheck    PROBLEMS:   Patient has no noted problems.    PATIENT REPORTED MEDICATIONS:  SULINDAC TABLET (SULINDAC TABS)   BUPROPION HCL TABLET (BUPROPION HCL TABS)   SPRINTEC 28 TABLET (NORGESTIMATE-ETH ESTRADIOL TABS)   ENBREL SURECLICK SOLUTION AUTO-INJECTOR (ETANERCEPT SOAJ)     PATIENT REPORTED ALLERGIES:   Patient has no noted allergies.      HISTORY OF PRESENT ILLNESS:    REASON FOR EVALUATION:  1.  Follow up left knee scope.  2.  Right wrist pain.    HISTORY OF PRESENT ILLNESS:  Nikky comes in to follow up on a left knee scope and lateral release.  She is doing better.  She is working through therapy.  She is four weeks postsurgical.  She is back to some of her normal work and release is going to be reintroduced this week.  She also reports persistent right wrist pain.  She has undergone bouts of therapy from April of 2019 through July of 2019.  Most of her pain is in the TFCC complex.  We talked about an MRI scan of the wrist.  I am working on getting her set-up for that once again as that has not been authorized at this point in time.    PAST MEDICAL HISTORY:    Juvenile Rheumatoid Arthritis  Depression  ADD  IBS    PAST ORTHOPEDIC SURGICAL HISTORY:    Left Knee Arthroscopy, Lateral Release, Chondroplasty, Plical Resection, Synovectomy 10/30/19    PAST SURGICAL HISTORY:    LASIK  Tooth Extraction x6    FAMILY HISTORY:    Mother:  Total Hysterectomy    SOCIAL HISTORY:   Marital Status:  Single  Occupation: Scribe/  Alcohol Use:  Yes, occasional  Tobacco Use: Former  Secondhand Smoke Exposure:  No  History HIV or Hepatitis:  No    PHYSICAL EXAMINATION:    Examination of the knee shows good range of motion.  Minimal swelling.  Strength is improving.  Neurovascular exam is intact.  Right wrist exam shows full range of motion.  Pain with TFCC grind test.  Mild discomfort with Daily shift test.  Good flexion and extension is otherwise present at this  time.    ASSESSMENT:    1.  Left knee arthroscopy with lateral release and patellofemoral chondroplasty.  2.  Right wrist pain, possible triangular fibrocartilage complex pathology.    PLAN:   Continue VMO exercises.  Increase activity as tolerated.  As far as the wrist is concerned on the right side, I advised an MRI scan to be set-up and evaluate for TFCC cartilage complex issues.  I will have Love work on the authorization.    Dictated by Rd Singh MD  cc:  MD Dr. Samantha Gutierrez at [facility]    D:  11/26/2019  T:  12/05/2019    Typed and/or reviewed and corrected by signing  below, and sent to the Physician for final review and signature.    This report was created using voice recording software and computer-generated templates. Although every effort has been made to review for and eliminate errors, some errors may still occur.

## 2019-12-10 ENCOUNTER — HOSPITAL ENCOUNTER (OUTPATIENT)
Dept: PHYSICAL THERAPY | Facility: OTHER | Age: 29
Setting detail: THERAPIES SERIES
End: 2019-12-10
Attending: ORTHOPAEDIC SURGERY
Payer: COMMERCIAL

## 2019-12-10 DIAGNOSIS — M08.00 JRA (JUVENILE RHEUMATOID ARTHRITIS) (H): Primary | ICD-10-CM

## 2019-12-10 PROCEDURE — 97110 THERAPEUTIC EXERCISES: CPT | Mod: GP | Performed by: PHYSICAL THERAPIST

## 2019-12-12 ENCOUNTER — HOSPITAL ENCOUNTER (OUTPATIENT)
Dept: PHYSICAL THERAPY | Facility: OTHER | Age: 29
Setting detail: THERAPIES SERIES
End: 2019-12-12
Attending: ORTHOPAEDIC SURGERY
Payer: COMMERCIAL

## 2019-12-12 PROCEDURE — 97110 THERAPEUTIC EXERCISES: CPT | Mod: GP | Performed by: PHYSICAL THERAPIST

## 2019-12-13 RX ORDER — SULINDAC 200 MG/1
TABLET ORAL
Qty: 180 TABLET | Refills: 3 | Status: SHIPPED | OUTPATIENT
Start: 2019-12-13 | End: 2019-12-19

## 2019-12-13 NOTE — TELEPHONE ENCOUNTER
[facility] sent Rx request for the following:      SULINDAC 200MG TABLETS  Sig: TAKE 1 TABLET(200 MG) BY MOUTH TWICE DAILY WITH MEALS  Last Prescription Date:   2/20/18  Last Fill Qty/Refills:         180, R-3    Last Office Visit:              10/15/19 (Pre-op physical)  Future Office visit:             Next 5 appointments (look out 90 days)    Dec 19, 2019  9:00 AM CST  SHORT with Chance Daly MD  [facility] ([facility]) [address]  [place] [address]  [phone]      Routing refill request to provider for review/approval because:    NSAID Medications Fxlwav20/13 1:30 PM   Normal ALT on file in past 12 months    Normal AST on file in past 12 months     Break in medication    Noted upcoming appointment. Routing to PCP, for consideration of 90-day refill. Unable to complete prescription refill per RN Medication Refill Policy. Melanie Gasca RN .............. 12/13/2019  2:03 PM

## 2019-12-17 ENCOUNTER — HOSPITAL ENCOUNTER (OUTPATIENT)
Dept: PHYSICAL THERAPY | Facility: OTHER | Age: 29
Setting detail: THERAPIES SERIES
End: 2019-12-17
Attending: ORTHOPAEDIC SURGERY
Payer: COMMERCIAL

## 2019-12-17 PROCEDURE — 97110 THERAPEUTIC EXERCISES: CPT | Mod: GP | Performed by: PHYSICAL THERAPIST

## 2019-12-19 ENCOUNTER — HOSPITAL ENCOUNTER (OUTPATIENT)
Dept: PHYSICAL THERAPY | Facility: OTHER | Age: 29
Setting detail: THERAPIES SERIES
End: 2019-12-19
Attending: ORTHOPAEDIC SURGERY
Payer: COMMERCIAL

## 2019-12-19 ENCOUNTER — OFFICE VISIT (OUTPATIENT)
Dept: FAMILY MEDICINE | Facility: OTHER | Age: 29
End: 2019-12-19
Attending: FAMILY MEDICINE
Payer: COMMERCIAL

## 2019-12-19 VITALS
SYSTOLIC BLOOD PRESSURE: 122 MMHG | TEMPERATURE: 98.6 F | BODY MASS INDEX: 22.63 KG/M2 | DIASTOLIC BLOOD PRESSURE: 66 MMHG | OXYGEN SATURATION: 99 % | RESPIRATION RATE: 16 BRPM | WEIGHT: 140.2 LBS | HEART RATE: 105 BPM

## 2019-12-19 DIAGNOSIS — M08.00 JRA (JUVENILE RHEUMATOID ARTHRITIS) (H): ICD-10-CM

## 2019-12-19 DIAGNOSIS — Z30.011 ENCOUNTER FOR INITIAL PRESCRIPTION OF CONTRACEPTIVE PILLS: ICD-10-CM

## 2019-12-19 DIAGNOSIS — F90.2 ATTENTION DEFICIT HYPERACTIVITY DISORDER (ADHD), COMBINED TYPE: ICD-10-CM

## 2019-12-19 DIAGNOSIS — L71.9 ROSACEA: Primary | ICD-10-CM

## 2019-12-19 PROCEDURE — 99213 OFFICE O/P EST LOW 20 MIN: CPT | Performed by: FAMILY MEDICINE

## 2019-12-19 PROCEDURE — 97110 THERAPEUTIC EXERCISES: CPT | Mod: GP | Performed by: PHYSICAL THERAPIST

## 2019-12-19 RX ORDER — BUPROPION HYDROCHLORIDE 150 MG/1
TABLET, EXTENDED RELEASE ORAL
Qty: 90 TABLET | Refills: 3 | Status: SHIPPED | OUTPATIENT
Start: 2019-12-19 | End: 2021-01-18

## 2019-12-19 RX ORDER — MUPIROCIN 20 MG/G
OINTMENT TOPICAL
Qty: 22 G | Refills: 3 | Status: SHIPPED | OUTPATIENT
Start: 2019-12-19 | End: 2020-05-28

## 2019-12-19 RX ORDER — NORGESTIMATE AND ETHINYL ESTRADIOL 0.25-0.035
KIT ORAL
Qty: 84 TABLET | Refills: 4 | Status: SHIPPED | OUTPATIENT
Start: 2019-12-19 | End: 2021-01-18

## 2019-12-19 RX ORDER — METRONIDAZOLE 7.5 MG/G
GEL TOPICAL 2 TIMES DAILY
Qty: 45 G | Refills: 11 | Status: SHIPPED | OUTPATIENT
Start: 2019-12-19 | End: 2022-12-23

## 2019-12-19 RX ORDER — SULINDAC 200 MG/1
TABLET ORAL
Qty: 180 TABLET | Refills: 3 | Status: SHIPPED | OUTPATIENT
Start: 2019-12-19 | End: 2021-01-18

## 2019-12-19 ASSESSMENT — ANXIETY QUESTIONNAIRES
7. FEELING AFRAID AS IF SOMETHING AWFUL MIGHT HAPPEN: NOT AT ALL
3. WORRYING TOO MUCH ABOUT DIFFERENT THINGS: NOT AT ALL
6. BECOMING EASILY ANNOYED OR IRRITABLE: NOT AT ALL
2. NOT BEING ABLE TO STOP OR CONTROL WORRYING: NOT AT ALL
IF YOU CHECKED OFF ANY PROBLEMS ON THIS QUESTIONNAIRE, HOW DIFFICULT HAVE THESE PROBLEMS MADE IT FOR YOU TO DO YOUR WORK, TAKE CARE OF THINGS AT HOME, OR GET ALONG WITH OTHER PEOPLE: NOT DIFFICULT AT ALL
GAD7 TOTAL SCORE: 0
1. FEELING NERVOUS, ANXIOUS, OR ON EDGE: NOT AT ALL
5. BEING SO RESTLESS THAT IT IS HARD TO SIT STILL: NOT AT ALL

## 2019-12-19 ASSESSMENT — PATIENT HEALTH QUESTIONNAIRE - PHQ9: 5. POOR APPETITE OR OVEREATING: NOT AT ALL

## 2019-12-19 ASSESSMENT — ENCOUNTER SYMPTOMS
FATIGUE: 0
BRUISES/BLEEDS EASILY: 0

## 2019-12-19 ASSESSMENT — PAIN SCALES - GENERAL: PAINLEVEL: MILD PAIN (2)

## 2019-12-19 NOTE — NURSING NOTE
"Coming in with nose problems    Chief Complaint   Patient presents with     Derm Problem     skin changes around the nose        Initial /66   Pulse 105   Temp 98.6  F (37  C) (Tympanic)   Resp 16   Wt 63.6 kg (140 lb 3.2 oz)   LMP 12/10/2019   SpO2 99%   Breastfeeding No   BMI 22.63 kg/m   Estimated body mass index is 22.63 kg/m  as calculated from the following:    Height as of 10/15/19: 1.676 m (5' 6\").    Weight as of this encounter: 63.6 kg (140 lb 3.2 oz).  Medication Reconciliation: complete    Erin Franco LPN  "

## 2019-12-19 NOTE — PROGRESS NOTES
SUBJECTIVE:   Nikky Singletary is a 29 year old female who presents to clinic today for the following health issues:    HPI  Rash on face.  Around nose with redness and swelling at times.  Recent flare about a week, was the 4th flare ni the last 2 months.  Used OCT steroid cream, seemed to dry it out more.  Has a acne face wash which helps. Was told she had rosacea as a youth.    Patient Active Problem List    Diagnosis Date Noted     Rosacea 12/19/2019     Priority: Medium     Chronic pain of left knee 10/15/2019     Priority: Medium     Tendinitis of extensor tendon of right hand 06/12/2018     Priority: Medium     HEATH positive 07/11/2017     Priority: Medium     High risk medication use 07/11/2017     Priority: Medium     Attention deficit hyperactivity disorder (ADHD), combined type 02/01/2017     Priority: Medium     Overview:   Diagnosed in elementary age. Used some medications but cannot recall what at that time.        Tendinitis of right elbow 02/01/2017     Priority: Medium     IBS (irritable bowel syndrome) 06/10/2015     Priority: Medium     Chronic polyarticular juvenile rheumatoid arthritis (H) 06/09/2014     Priority: Medium     Overview:   IMO Update 10/11  Overview:   Diagnosed at age 3 years. Followed at [place] as a child.   Sees Dr Farmer twice a year       Acne, mild 11/18/2010     Priority: Medium     Myopia 03/23/2005     Priority: Medium     Past Surgical History:   Procedure Laterality Date     EXTRACTION(S) DENTAL       Social History     Tobacco Use     Smoking status: Never Smoker     Smokeless tobacco: Former User     Types: Chew     Tobacco comment: Quit smoking: Rarely, with driving, roomate smokes   Substance Use Topics     Alcohol use: Yes     Alcohol/week: 0.0 standard drinks     Comment: Alcoholic Drinks/day: occasional     Current Outpatient Medications   Medication Sig Dispense Refill     buPROPion (WELLBUTRIN SR) 150 MG 12 hr tablet TAKE 1 TABLET(150 MG) BY MOUTH EVERY MORNING  90 tablet 3     Etanercept (ENBREL SURECLICK) 50 MG/ML autoinjector Inject 50 mg Subcutaneous       norgestimate-ethinyl estradiol (SPRINTEC 28) 0.25-35 MG-MCG tablet TAKE 1 TABLET BY MOUTH ONCE DAILY. 84 tablet 4     sulindac (CLINORIL) 200 MG tablet TAKE 1 TABLET(200 MG) BY MOUTH TWICE DAILY WITH MEALS 180 tablet 3     No Known Allergies    Review of Systems   Constitutional: Negative for fatigue.   Skin: Positive for rash.   Hematological: Does not bruise/bleed easily.        OBJECTIVE:     /66   Pulse 105   Temp 98.6  F (37  C) (Tympanic)   Resp 16   Wt 63.6 kg (140 lb 3.2 oz)   LMP 12/10/2019   SpO2 99%   Breastfeeding No   BMI 22.63 kg/m    Body mass index is 22.63 kg/m .  Physical Exam  Constitutional:       Appearance: Normal appearance.   Skin:     Comments: Mild erythema along left nose and into philtrum    Neurological:      General: No focal deficit present.      Mental Status: She is alert and oriented to person, place, and time.         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     (Z30.011) Encounter for initial prescription of contraceptive pills  Comment:    Plan: norgestimate-ethinyl estradiol (SPRINTEC 28)         0.25-35 MG-MCG tablet         refilled    (F90.2) Attention deficit hyperactivity disorder (ADHD), combined type  Comment:    Plan: buPROPion (WELLBUTRIN SR) 150 MG 12 hr tablet         refilled    (M08.00) JRA (juvenile rheumatoid arthritis) (H)  Comment:    Plan: sulindac (CLINORIL) 200 MG tablet         refilled        Chance Daly MD  [facility]

## 2019-12-20 ASSESSMENT — ANXIETY QUESTIONNAIRES: GAD7 TOTAL SCORE: 0

## 2019-12-27 ENCOUNTER — HOSPITAL ENCOUNTER (OUTPATIENT)
Dept: PHYSICAL THERAPY | Facility: OTHER | Age: 29
Setting detail: THERAPIES SERIES
End: 2019-12-27
Attending: ORTHOPAEDIC SURGERY
Payer: COMMERCIAL

## 2019-12-27 PROCEDURE — 97110 THERAPEUTIC EXERCISES: CPT | Mod: GP | Performed by: PHYSICAL THERAPIST

## 2019-12-30 ENCOUNTER — HOSPITAL ENCOUNTER (OUTPATIENT)
Dept: PHYSICAL THERAPY | Facility: OTHER | Age: 29
Setting detail: THERAPIES SERIES
End: 2019-12-30
Attending: ORTHOPAEDIC SURGERY
Payer: COMMERCIAL

## 2019-12-30 PROCEDURE — 97110 THERAPEUTIC EXERCISES: CPT | Mod: GP | Performed by: PHYSICAL THERAPIST

## 2020-01-06 NOTE — PROGRESS NOTES
Outpatient Physical Therapy Discharge Note     Patient: Nikky Singletary  : 1990    Beginning/End Dates of Reporting Period:  2019 to 2020    Referring Provider: Dr. iSngh    Therapy Diagnosis: impaired mobility     Client Self Report: Nikky reports not much new since last week. Was really sore yesterday but might have been the weather. Rates pain today 3/10.     Objective Measurements:  Objective Measure: pelvic symmetry  Details: FFT neg; leg length equal, equal pubes and pelvis         Outcome Measures (most recent score):  PSFS see last progress note from     Goals:  Goal Identifier strength   Goal Description Pt will have improved gluteal strength to 5/5 to allow squatting with appropriate mechanics and pain less than 2/10.   Target Date 19   Date Met      Progress:     Goal Identifier Volleyball   Goal Description Pt will have improved knee mechanics to allow return to volleyball activities with minimal pain increase.   Target Date 19   Date Met      Progress:     Goal Identifier HEP   Goal Description Pt will be independent and consistent with performance of a customized home exercise program.   Target Date 19   Date Met  19   Progress:       Progress Toward Goals:   Not assessed this period.          Plan:  Discharge from therapy.    Discharge:    Reason for Discharge: No further expectation of progress.    Equipment Issued: n/a    Discharge Plan: Patient to continue home program.  Return to orthopedic doctor for further evalu

## 2020-01-29 NOTE — PROGRESS NOTES
Outpatient Physical Therapy Discharge Note     Patient: Nikky Singletary  : 1990    Beginning/End Dates:  19 to 19    Referring Provider: Dr Singh    Therapy Diagnosis: Left knee pain M25.562     Plan:  Discharge from therapy.    Discharge:   Pt has not returned to physical therapy after a trial of self mgmt techniques indicating that she is doing well with HEP. Please refer to pt's chart for most recent information on objective measurements, goals or any additional information. This is an unplanned DC    Reason for Discharge:   Patient has not scheduled further appointments.    Discharge Plan: Patient to continue home program.

## 2020-02-24 ENCOUNTER — OFFICE VISIT (OUTPATIENT)
Dept: FAMILY MEDICINE | Facility: OTHER | Age: 30
End: 2020-02-24
Attending: FAMILY MEDICINE
Payer: COMMERCIAL

## 2020-02-24 VITALS
DIASTOLIC BLOOD PRESSURE: 74 MMHG | RESPIRATION RATE: 16 BRPM | BODY MASS INDEX: 23.76 KG/M2 | WEIGHT: 142.6 LBS | HEART RATE: 92 BPM | SYSTOLIC BLOOD PRESSURE: 122 MMHG | HEIGHT: 65 IN | TEMPERATURE: 98.7 F | OXYGEN SATURATION: 99 %

## 2020-02-24 DIAGNOSIS — Z00.00 ROUTINE GENERAL MEDICAL EXAMINATION AT A HEALTH CARE FACILITY: Primary | ICD-10-CM

## 2020-02-24 PROCEDURE — G0123 SCREEN CERV/VAG THIN LAYER: HCPCS | Performed by: FAMILY MEDICINE

## 2020-02-24 PROCEDURE — 99395 PREV VISIT EST AGE 18-39: CPT | Performed by: FAMILY MEDICINE

## 2020-02-24 PROCEDURE — 40001026 ZZHCL STATISTICAL PAP TEST QC: Performed by: FAMILY MEDICINE

## 2020-02-24 PROCEDURE — 88142 CYTOPATH C/V THIN LAYER: CPT | Performed by: FAMILY MEDICINE

## 2020-02-24 ASSESSMENT — ANXIETY QUESTIONNAIRES
1. FEELING NERVOUS, ANXIOUS, OR ON EDGE: SEVERAL DAYS
3. WORRYING TOO MUCH ABOUT DIFFERENT THINGS: MORE THAN HALF THE DAYS
7. FEELING AFRAID AS IF SOMETHING AWFUL MIGHT HAPPEN: SEVERAL DAYS
6. BECOMING EASILY ANNOYED OR IRRITABLE: NEARLY EVERY DAY
IF YOU CHECKED OFF ANY PROBLEMS ON THIS QUESTIONNAIRE, HOW DIFFICULT HAVE THESE PROBLEMS MADE IT FOR YOU TO DO YOUR WORK, TAKE CARE OF THINGS AT HOME, OR GET ALONG WITH OTHER PEOPLE: VERY DIFFICULT
5. BEING SO RESTLESS THAT IT IS HARD TO SIT STILL: MORE THAN HALF THE DAYS
GAD7 TOTAL SCORE: 12
2. NOT BEING ABLE TO STOP OR CONTROL WORRYING: SEVERAL DAYS

## 2020-02-24 ASSESSMENT — PATIENT HEALTH QUESTIONNAIRE - PHQ9: 5. POOR APPETITE OR OVEREATING: MORE THAN HALF THE DAYS

## 2020-02-24 ASSESSMENT — PAIN SCALES - GENERAL: PAINLEVEL: MODERATE PAIN (4)

## 2020-02-24 ASSESSMENT — MIFFLIN-ST. JEOR: SCORE: 1379.29

## 2020-02-24 NOTE — PROGRESS NOTES
SUBJECTIVE:   CC: Nikky Singletary is an 29 year old woman who presents for preventive health visit.     Healthy Habits:    Do you get at least three servings of calcium containing foods daily (dairy, green leafy vegetables, etc.)? yes    Amount of exercise or daily activities, outside of work: 3-4 day(s) per week    Problems taking medications regularly No    Medication side effects: No    Have you had an eye exam in the past two years? yes    Do you see a dentist twice per year? no    Do you have sleep apnea, excessive snoring or daytime drowsiness?no           Today's PHQ-2 Score:   PHQ-2 ( 1999 Pfizer) 2/24/2020 12/19/2019   Q1: Little interest or pleasure in doing things 0 0   Q2: Feeling down, depressed or hopeless 0 0   PHQ-2 Score 0 0       Abuse: Current or Past(Physical, Sexual or Emotional)- No  Do you feel safe in your environment? Yes        Social History     Tobacco Use     Smoking status: Never Smoker     Smokeless tobacco: Former User     Types: Chew     Tobacco comment: Quit smoking: Rarely, with driving, roomate smokes   Substance Use Topics     Alcohol use: Yes     Alcohol/week: 0.0 standard drinks     Comment: Alcoholic Drinks/day: occasional     If you drink alcohol do you typically have >3 drinks per day or >7 drinks per week? No                     Reviewed orders with patient.  Reviewed health maintenance and updated orders accordingly - Yes  Lab work is in process  Labs reviewed in [facility]  Current Outpatient Medications   Medication Sig Dispense Refill     buPROPion (WELLBUTRIN SR) 150 MG 12 hr tablet TAKE 1 TABLET(150 MG) BY MOUTH EVERY MORNING 90 tablet 3     Etanercept (ENBREL SURECLICK) 50 MG/ML autoinjector Inject 50 mg Subcutaneous       metroNIDAZOLE (METROGEL) 0.75 % external gel Apply topically 2 times daily 45 g 11     mupirocin (BACTROBAN) 2 % external ointment Apply to nares twice daily 22 g 3     norgestimate-ethinyl estradiol (SPRINTEC 28) 0.25-35 MG-MCG tablet TAKE 1 TABLET  BY MOUTH ONCE DAILY. 84 tablet 4     sulindac (CLINORIL) 200 MG tablet TAKE 1 TABLET(200 MG) BY MOUTH TWICE DAILY WITH MEALS 180 tablet 3     No Known Allergies    Mammogram not appropriate for this patient based on age.    Pertinent mammograms are reviewed under the imaging tab.  History of abnormal Pap smear: NO - age 21-29 PAP every 3 years recommended     Reviewed and updated as needed this visit by clinical staff  Tobacco  Allergies  Meds  Med Hx  Soc Hx        Reviewed and updated as needed this visit by Provider        Past Medical History:   Diagnosis Date     Acne, mild 11/18/2010     HEATH positive 7/11/2017     Attention deficit hyperactivity disorder (ADHD), combined type 2/1/2017    Overview:  Diagnosed in elementary age. Used some medications but cannot recall what at that time.      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 Followed at [facility] as a child.  Sees Dr Farmer twice a year     IBS (irritable bowel syndrome) 6/10/2015     Juvenile rheumatoid arthritis (H) 06/09/2014    Diagnosed at age 3 years, followed by [facility] as a child.  Follows Dr Farmer twice a year.     Myopia 3/23/2005     Tendinitis of extensor tendon of right hand 6/12/2018     Tendinitis of right elbow 2/1/2017     Varicella without complication     As a child      Past Surgical History:   Procedure Laterality Date     ARTHROSCOPY KNEE Left 10/2019     EXTRACTION(S) DENTAL         ROS:  CONSTITUTIONAL: NEGATIVE for fever, chills, change in weight  INTEGUMENTARU/SKIN: NEGATIVE for worrisome rashes, moles or lesions  EYES: NEGATIVE for vision changes or irritation  ENT: NEGATIVE for ear, mouth and throat problems  RESP: NEGATIVE for significant cough or SOB  BREAST: NEGATIVE for masses, tenderness or discharge  CV: NEGATIVE for chest pain, palpitations or peripheral edema  GI: NEGATIVE for nausea, abdominal pain, heartburn, or change in  "bowel habits  : NEGATIVE for unusual urinary or vaginal symptoms. Periods are regular.  MUSCULOSKELETAL: NEGATIVE for significant arthralgias or myalgia  NEURO: NEGATIVE for weakness, dizziness or paresthesias  PSYCHIATRIC: more anxiety lately.    OBJECTIVE:   /74   Pulse 92   Temp 98.7  F (37.1  C) (Temporal)   Resp 16   Ht 1.654 m (5' 5.1\")   Wt 64.7 kg (142 lb 9.6 oz)   LMP 02/03/2020 (Exact Date)   SpO2 99%   Breastfeeding No   BMI 23.66 kg/m    EXAM:  GENERAL: healthy, alert and no distress  EYES: Eyes grossly normal to inspection, PERRL and conjunctivae and sclerae normal  HENT: ear canals and TM's normal, nose and mouth without ulcers or lesions  NECK: no adenopathy, no asymmetry, masses, or scars and thyroid normal to palpation  RESP: lungs clear to auscultation - no rales, rhonchi or wheezes  CV: regular rate and rhythm, normal S1 S2, no S3 or S4, no murmur, click or rub, no peripheral edema and peripheral pulses strong  ABDOMEN: soft, nontender, no hepatosplenomegaly, no masses and bowel sounds normal   (female): normal female external genitalia, normal urethral meatus, vaginal mucosa pink, moist, well rugated, and normal cervix/adnexa/uterus without masses or discharge  MS: no gross musculoskeletal defects noted, no edema  SKIN: no suspicious lesions or rashes  NEURO: Normal strength and tone, mentation intact and speech normal  PSYCH: mentation appears normal, affect normal/bright    Diagnostic Test Results:  Labs reviewed in Epic    Nurse present for entire exam.  ASSESSMENT/PLAN:       ICD-10-CM    1. Routine general medical examination at a health care facility Z00.00 Pap Screen Thin Prep only - recommended age 21 - 24 years     CANCELED: HPV High Risk Types DNA Cervical       COUNSELING:   Reviewed preventive health counseling, as reflected in patient instructions       Regular exercise       Healthy diet/nutrition    Estimated body mass index is 23.66 kg/m  as calculated from the " "following:    Height as of this encounter: 1.654 m (5' 5.1\").    Weight as of this encounter: 64.7 kg (142 lb 9.6 oz).         reports that she has never smoked. She has quit using smokeless tobacco.  Her smokeless tobacco use included chew.      Counseling Resources:  ATP IV Guidelines  Pooled Cohorts Equation Calculator  Breast Cancer Risk Calculator  FRAX Risk Assessment  ICSI Preventive Guidelines  Dietary Guidelines for Americans, 2010  USDA's MyPlate  ASA Prophylaxis  Lung CA Screening    Chance Daly MD  [facility]  "

## 2020-02-24 NOTE — PATIENT INSTRUCTIONS
Preventive Health Recommendations  Female Ages 26 - 39  Yearly exam:   See your health care provider every year in order to    Review health changes.     Discuss preventive care.      Review your medicines if you your doctor has prescribed any.    Until age 30: Get a Pap test every three years (more often if you have had an abnormal result).    After age 30: Talk to your doctor about whether you should have a Pap test every 3 years or have a Pap test with HPV screening every 5 years.   You do not need a Pap test if your uterus was removed (hysterectomy) and you have not had cancer.  You should be tested each year for STDs (sexually transmitted diseases), if you're at risk.   Talk to your provider about how often to have your cholesterol checked.  If you are at risk for diabetes, you should have a diabetes test (fasting glucose).  Shots: Get a flu shot each year. Get a tetanus shot every 10 years.   Nutrition:     Eat at least 5 servings of fruits and vegetables each day.    Eat whole-grain bread, whole-wheat pasta and brown rice instead of white grains and rice.    Get adequate Calcium and Vitamin D.     Lifestyle    Exercise at least 150 minutes a week (30 minutes a day, 5 days of the week). This will help you control your weight and prevent disease.    Limit alcohol to one drink per day.    No smoking.     Wear sunscreen to prevent skin cancer.    See your dentist every six months for an exam and cleaning.    [place] counselors/therapists   Telephone Hours Kids? Address   [facility]  (Many counselors) [phone] M-Th 8-5  F 8-12 Yes [address]   [email or web address]   [facility]  (Many counselors) [phone]  Yes [address] [facility] [address] [place]   [email or web address]   [facility]  (Many counselors) [phone]  Yes [facility] [place]  [email or web address]   [facility]  Krish Mendoza [phone]  Yes [place] [address]  [email or web address]   [facility]  Kirit Babak [phone]  Yes [address]   [address]  [email or web address]   Serena Sinclair [phone]   [address] Averika [email or web address]   [facility]  Renaldo Hassan  Nancy Manuel  Cherilacy Shenparth Luther Brandin [phone]   [address] [facility] [place] [facility] [address]  [email or web address]   Anne-Marie Virk [phone]   [address]   Oumou Kellie [phone]   [address] [place] [address]   Michelle Allen [phone]  Yes [address]   Mary Williamson [phone]   [address] [place]    Lewis Jas [phone]   [address] [place] [address]   Radha Mac [phone]   [address] [place] [address]   [email or web address]   Carmen Francisco [phone]   [address] [place] [address] [address]  (Psychiatric)  [email or web address]   [facility]  Darius Nugent [phone]   [address] [facility]  Michele Rinne Cindy Thomas [phone]      [facility]  Brandan Avendano CNP [phone] M-F 8-5 Yes [address], [place]  [email or web address]   [facility] [place] [phone]  Yes ROSANNA Pierre  [address]  [email or web address]   [facility]: [place] [phone]  Yes ROSANNA Daigle  [address]  [email or web address]     Jennifer Venegas as well.

## 2020-02-24 NOTE — NURSING NOTE
"Chief Complaint   Patient presents with     Physical   Patient here for physical. Would like Chance Daly MD to look at her toes and address anxiety concerns.    Initial /74   Pulse 92   Temp 98.7  F (37.1  C) (Temporal)   Resp 16   Ht 1.654 m (5' 5.1\")   Wt 64.7 kg (142 lb 9.6 oz)   LMP 02/03/2020 (Exact Date)   SpO2 99%   Breastfeeding No   BMI 23.66 kg/m   Estimated body mass index is 23.66 kg/m  as calculated from the following:    Height as of this encounter: 1.654 m (5' 5.1\").    Weight as of this encounter: 64.7 kg (142 lb 9.6 oz).  Medication Reconciliation: complete    Essence Escobar LPN  "

## 2020-02-25 ASSESSMENT — ANXIETY QUESTIONNAIRES: GAD7 TOTAL SCORE: 12

## 2020-03-01 DIAGNOSIS — Z30.011 ENCOUNTER FOR INITIAL PRESCRIPTION OF CONTRACEPTIVE PILLS: ICD-10-CM

## 2020-03-04 RX ORDER — NORGESTIMATE AND ETHINYL ESTRADIOL 0.25-0.035
KIT ORAL
Qty: 84 TABLET | Refills: 4 | OUTPATIENT
Start: 2020-03-04

## 2020-03-04 NOTE — TELEPHONE ENCOUNTER
"Requested Prescriptions   Pending Prescriptions Disp Refills     ESTARYLLA 0.25-35 MG-MCG tablet [Pharmacy Med Name: ESTARYLLA TABLETS 28S] 84 tablet 4     Sig: TAKE 1 TABLET BY MOUTH EVERY DAY       Contraceptives Protocol Passed - 3/1/2020  4:06 AM        Passed - Patient is not a current smoker if age is 35 or older        Passed - Recent (12 mo) or future (30 days) visit within the authorizing provider's specialty     Patient has had an office visit with the authorizing provider or a provider within the authorizing providers department within the previous 12 mos or has a future within next 30 days. See \"Patient Info\" tab in inbasket, or \"Choose Columns\" in Meds & Orders section of the refill encounter.              Passed - Medication is active on med list        Passed - No active pregnancy on record        Passed - No positive pregnancy test in past 12 months      Talked with Diane at [facility] pharmacy she states to go head and deny request they will have refill's transferred from [facility].  Refused request.  Shahida Patten RN on 3/4/2020 at 9:25 AM      "

## 2020-03-10 LAB
COPATH REPORT: NORMAL
PAP: NORMAL

## 2020-05-28 ENCOUNTER — MYC REFILL (OUTPATIENT)
Dept: FAMILY MEDICINE | Facility: OTHER | Age: 30
End: 2020-05-28

## 2020-05-28 DIAGNOSIS — L71.9 ROSACEA: Primary | ICD-10-CM

## 2020-05-28 RX ORDER — MUPIROCIN 20 MG/G
OINTMENT TOPICAL
Qty: 22 G | Refills: 3 | Status: SHIPPED | OUTPATIENT
Start: 2020-05-28 | End: 2022-12-23

## 2020-05-28 NOTE — TELEPHONE ENCOUNTER
Patient sent [employer] Message, requesting refill of the following, to be sent to [id number] of [place]:     Patient comment: I seem to have lost this and it has flared up again. Can you please refill this for me?       mupirocin (BACTROBAN) 2 % external ointment      Sig: Apply to nares twice daily    Last Prescription Date:   12/19/19  Last Fill Qty/Refills:         22g, R-3    Last Office Visit:              2/24/20 (Px)  Future Office visit:           None  Routing refill request to provider for review/approval because:  Drug not on the [facility], P or [facility] refill protocol or controlled substance    Unable to complete prescription refill per RN Medication Refill Policy. Melanie Gasca RN .............. 5/28/2020  3:30 PM

## 2020-09-10 ENCOUNTER — OFFICE VISIT (OUTPATIENT)
Dept: FAMILY MEDICINE | Facility: OTHER | Age: 30
End: 2020-09-10
Attending: FAMILY MEDICINE
Payer: COMMERCIAL

## 2020-09-10 VITALS
BODY MASS INDEX: 23.53 KG/M2 | DIASTOLIC BLOOD PRESSURE: 72 MMHG | WEIGHT: 141.2 LBS | RESPIRATION RATE: 16 BRPM | SYSTOLIC BLOOD PRESSURE: 126 MMHG | TEMPERATURE: 97.6 F | HEIGHT: 65 IN | OXYGEN SATURATION: 99 % | HEART RATE: 90 BPM

## 2020-09-10 DIAGNOSIS — K30 UPSET STOMACH: ICD-10-CM

## 2020-09-10 DIAGNOSIS — J02.9 SORE THROAT: Primary | ICD-10-CM

## 2020-09-10 DIAGNOSIS — J34.89 RHINORRHEA: ICD-10-CM

## 2020-09-10 LAB
SPECIMEN SOURCE: NORMAL
STREP GROUP A PCR: NOT DETECTED

## 2020-09-10 PROCEDURE — 99213 OFFICE O/P EST LOW 20 MIN: CPT | Performed by: PHYSICIAN ASSISTANT

## 2020-09-10 PROCEDURE — U0003 INFECTIOUS AGENT DETECTION BY NUCLEIC ACID (DNA OR RNA); SEVERE ACUTE RESPIRATORY SYNDROME CORONAVIRUS 2 (SARS-COV-2) (CORONAVIRUS DISEASE [COVID-19]), AMPLIFIED PROBE TECHNIQUE, MAKING USE OF HIGH THROUGHPUT TECHNOLOGIES AS DESCRIBED BY CMS-2020-01-R: HCPCS | Mod: ZL | Performed by: PHYSICIAN ASSISTANT

## 2020-09-10 PROCEDURE — 87651 STREP A DNA AMP PROBE: CPT | Mod: ZL | Performed by: PHYSICIAN ASSISTANT

## 2020-09-10 ASSESSMENT — MIFFLIN-ST. JEOR: SCORE: 1371.36

## 2020-09-10 ASSESSMENT — PAIN SCALES - GENERAL: PAINLEVEL: MILD PAIN (3)

## 2020-09-10 NOTE — NURSING NOTE
"Chief Complaint   Patient presents with     Pharyngitis       Patient presented with sore throat since Tuesday and along with runny nose. Trinh Titus LPN on 9/10/2020 at 10:49 AM    Initial /72 (BP Location: Right arm, Patient Position: Sitting, Cuff Size: Adult Regular)   Pulse 90   Temp 97.6  F (36.4  C) (Tympanic)   Resp 16   Ht 1.651 m (5' 5\")   Wt 64 kg (141 lb 3.2 oz)   LMP 08/10/2020 (Within Days)   SpO2 99%   BMI 23.50 kg/m   Estimated body mass index is 23.5 kg/m  as calculated from the following:    Height as of this encounter: 1.651 m (5' 5\").    Weight as of this encounter: 64 kg (141 lb 3.2 oz).  Medication Reconciliation: complete    Trinh Titus LPN  "

## 2020-09-10 NOTE — PROGRESS NOTES
"Nursing Notes:   Trinh Titus LPN  9/10/2020 10:53 AM  Signed  Chief Complaint   Patient presents with     Pharyngitis       Patient presented with sore throat since Tuesday and along with runny nose. Trinh Titus LPN on 9/10/2020 at 10:49 AM    Initial /72 (BP Location: Right arm, Patient Position: Sitting, Cuff Size: Adult Regular)   Pulse 90   Temp 97.6  F (36.4  C) (Tympanic)   Resp 16   Ht 1.651 m (5' 5\")   Wt 64 kg (141 lb 3.2 oz)   LMP 08/10/2020 (Within Days)   SpO2 99%   BMI 23.50 kg/m   Estimated body mass index is 23.5 kg/m  as calculated from the following:    Height as of this encounter: 1.651 m (5' 5\").    Weight as of this encounter: 64 kg (141 lb 3.2 oz).  Medication Reconciliation: complete    Trinh Titus LPN    SUBJECTIVE:     HPI  Nikky Singletary is a 29 year old female who presents to clinic today for evaluation of sore throat and rhinorrhea that began on Tuesday. Notes the sore throat woke her up that morning. Over the last few days she has also been having some on and off stomach pain and more frequent stools. She is on a Etanercept which suppresses her immune system somewhat. No known sick contacts. 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 Has been taking an OTC cold medication which has provided minimal relief. Denies fever/chills, cough, shortness of breath, wheezing, ear pain or drainage, sinus pain or pressure, rash.       Review of Systems   Per HPI.     PAST MEDICAL HISTORY:   Past Medical History:   Diagnosis Date     Acne, mild 11/18/2010     HEATH positive 7/11/2017     Attention deficit hyperactivity disorder (ADHD), combined type 2/1/2017    Overview:  Diagnosed in elementary age. Used some medications but cannot recall what at that time.      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 Followed at [facility] as a child.  Sees Dr Farmer twice a year     IBS (irritable " bowel syndrome) 6/10/2015     Juvenile rheumatoid arthritis (H) 06/09/2014    Diagnosed at age 3 years, followed by [facility] as a child.  Follows Dr Farmer twice a year.     Myopia 3/23/2005     Tendinitis of extensor tendon of right hand 6/12/2018     Tendinitis of right elbow 2/1/2017     Varicella without complication     As a child       PAST SURGICAL HISTORY:   Past Surgical History:   Procedure Laterality Date     ARTHROSCOPY KNEE Left 10/2019     EXTRACTION(S) DENTAL         FAMILY HISTORY:   Family History   Problem Relation Age of Onset     Family History Negative Mother         Good Health     Family History Negative Father         Good Health     Breast Cancer Maternal Grandmother         Cancer-breast     Ankylosing Spondylitis Paternal Grandmother         Ankylosing spondylitis     Glaucoma Paternal Grandfather         Glaucoma     Other - See Comments Maternal Aunt         Kidney stones     Other - See Comments Maternal Uncle         Kidney stones       SOCIAL HISTORY:   Social History     Tobacco Use     Smoking status: Never Smoker     Smokeless tobacco: Former User     Types: Chew     Tobacco comment: Quit smoking: Rarely, with driving, roomate smokes   Substance Use Topics     Alcohol use: Yes     Alcohol/week: 0.0 standard drinks     Comment: Alcoholic Drinks/day: occasional      No Known Allergies  Current Outpatient Medications   Medication     buPROPion (WELLBUTRIN SR) 150 MG 12 hr tablet     Etanercept (ENBREL SURECLICK) 50 MG/ML autoinjector     metroNIDAZOLE (METROGEL) 0.75 % external gel     mupirocin (BACTROBAN) 2 % external ointment     norgestimate-ethinyl estradiol (SPRINTEC 28) 0.25-35 MG-MCG tablet     sulindac (CLINORIL) 200 MG tablet     No current facility-administered medications for this visit.          OBJECTIVE:     /72 (BP Location: Right arm, Patient Position: Sitting, Cuff Size: Adult Regular)   Pulse 90   Temp 97.6  F (36.4  C) (Tympanic)   Resp 16   Ht 1.651 m (5'  "5\")   Wt 64 kg (141 lb 3.2 oz)   LMP 08/10/2020 (Within Days)   SpO2 99%   BMI 23.50 kg/m    Body mass index is 23.5 kg/m .  Physical Exam  General: Pleasant, in no apparent distress.  Eyes: Sclera are white and conjunctiva are clear bilaterally. Lacrimal apparatus free of erythema, edema, and discharge bilaterally.  Ears: External ears without erythema or edema. Tympanic membranes are pearly white and without erythema, scarring or perforations bilaterally. External auditory canals are free of foreign bodies, erythema, ulcers, and masses.  Nose: External nose is symmetrical and free of lesions and deformities. Mucosa is soft pink and without erythema, edema, bleeding, or exudate. No septal perforation or deviation.  Oropharynx: Oral mucosa is pink and without ulcers, nodules, and white patches. Tongue is symmetrical, pink, and without masses or lesions. Pharynx is erythematous, symmetrical, and without lesions. Uvula is midline. Tonsils are pink, symmetrical, and without edema, ulcers, or exudates, and 1+ bilaterally.  Neck: No cervical lymphadenopathy on inspection and palpation.  Cardiovascular: Regular rate and rhythm with S1 equal to S2. No murmurs, friction rubs, or gallops.   Respiratory: Lungs are resonant and clear to auscultation bilaterally. No wheezes, crackles, or rhonchi.  Abdomen: Abdomen is non-distended and without bulging flanks, prominent venous markings, or ecchymosis. Active bowel sounds heard in all four quadrants. No tenderness to palpation in all four quadrants. No rebound tenderness or guarding. No palpable masses noted. No hepatosplenomegaly.  Psych: Appropriate mood and affect.    ASSESSMENT/PLAN:     1. Sore throat    2. Upset stomach    3. 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 Results are pending. Will notify patient with results and treat if indicated. In the meantime, encouraged symptomatic " management with Tylenol or ibuprofen, cough drops, warm tea, honey, OTC cough and cold medications. Call or return to the clinic if symptoms persist, worsen, or new symptoms arise.       Maira Becker PA-C  [facility]

## 2020-09-10 NOTE — LETTER
[facility]  [address]  [place] [address]  [phone]  Dept: [phone]      September 10, 2020      Patient: Nikky Singletary   YOB: 1990   Date of Visit: 9/10/2020       To Whom It May Concern:    Nikky Singletary was seen and treated in our clinic today. Please excuse her absence from work until results are received.            Sincerely,           Maira Becker PA-C

## 2020-09-11 LAB
SARS-COV-2 RNA SPEC QL NAA+PROBE: NOT DETECTED
SPECIMEN SOURCE: NORMAL

## 2021-01-18 ENCOUNTER — MYC MEDICAL ADVICE (OUTPATIENT)
Dept: FAMILY MEDICINE | Facility: OTHER | Age: 31
End: 2021-01-18

## 2021-01-18 DIAGNOSIS — M08.00 JRA (JUVENILE RHEUMATOID ARTHRITIS) (H): ICD-10-CM

## 2021-01-18 DIAGNOSIS — Z30.011 ENCOUNTER FOR INITIAL PRESCRIPTION OF CONTRACEPTIVE PILLS: ICD-10-CM

## 2021-01-18 DIAGNOSIS — F90.2 ATTENTION DEFICIT HYPERACTIVITY DISORDER (ADHD), COMBINED TYPE: ICD-10-CM

## 2021-01-18 RX ORDER — NORGESTIMATE AND ETHINYL ESTRADIOL 0.25-0.035
KIT ORAL
Qty: 84 TABLET | Refills: 4 | Status: SHIPPED | OUTPATIENT
Start: 2021-01-18 | End: 2021-09-17

## 2021-01-18 RX ORDER — BUPROPION HYDROCHLORIDE 150 MG/1
TABLET, EXTENDED RELEASE ORAL
Qty: 90 TABLET | Refills: 3 | Status: SHIPPED | OUTPATIENT
Start: 2021-01-18 | End: 2021-09-17

## 2021-01-18 RX ORDER — SULINDAC 200 MG/1
TABLET ORAL
Qty: 180 TABLET | Refills: 3 | Status: SHIPPED | OUTPATIENT
Start: 2021-01-18 | End: 2022-12-23

## 2021-04-25 ENCOUNTER — HEALTH MAINTENANCE LETTER (OUTPATIENT)
Age: 31
End: 2021-04-25

## 2021-04-28 ENCOUNTER — RESULTS ONLY (OUTPATIENT)
Dept: LAB | Age: 31
End: 2021-04-28

## 2021-04-28 LAB
LABORATORY COMMENT REPORT: NORMAL
SARS-COV-2 RNA RESP QL NAA+PROBE: NEGATIVE
SPECIMEN SOURCE: NORMAL

## 2021-04-29 ENCOUNTER — OFFICE VISIT (OUTPATIENT)
Dept: FAMILY MEDICINE | Facility: OTHER | Age: 31
End: 2021-04-29
Attending: FAMILY MEDICINE
Payer: COMMERCIAL

## 2021-04-29 VITALS
OXYGEN SATURATION: 98 % | WEIGHT: 140.4 LBS | DIASTOLIC BLOOD PRESSURE: 64 MMHG | TEMPERATURE: 98 F | BODY MASS INDEX: 23.36 KG/M2 | HEART RATE: 62 BPM | SYSTOLIC BLOOD PRESSURE: 122 MMHG | RESPIRATION RATE: 14 BRPM

## 2021-04-29 DIAGNOSIS — J06.9 VIRAL URI WITH COUGH: Primary | ICD-10-CM

## 2021-04-29 DIAGNOSIS — J34.89 RHINORRHEA: ICD-10-CM

## 2021-04-29 PROBLEM — R29.898 DECREASED GRIP STRENGTH OF RIGHT HAND: Status: ACTIVE | Noted: 2020-08-06

## 2021-04-29 PROBLEM — M25.631 DECREASED RANGE OF MOTION OF RIGHT WRIST: Status: ACTIVE | Noted: 2020-08-06

## 2021-04-29 PROBLEM — M25.521 RIGHT ELBOW PAIN: Status: ACTIVE | Noted: 2020-08-06

## 2021-04-29 PROBLEM — R29.898 DECREASED PINCH STRENGTH: Status: ACTIVE | Noted: 2020-08-06

## 2021-04-29 PROCEDURE — 99213 OFFICE O/P EST LOW 20 MIN: CPT | Performed by: FAMILY MEDICINE

## 2021-04-29 RX ORDER — MONTELUKAST SODIUM 10 MG/1
10 TABLET ORAL AT BEDTIME
Qty: 90 TABLET | Refills: 3 | Status: SHIPPED | OUTPATIENT
Start: 2021-04-29 | End: 2022-12-23

## 2021-04-29 ASSESSMENT — ANXIETY QUESTIONNAIRES
1. FEELING NERVOUS, ANXIOUS, OR ON EDGE: NOT AT ALL
GAD7 TOTAL SCORE: 0
7. FEELING AFRAID AS IF SOMETHING AWFUL MIGHT HAPPEN: NOT AT ALL
3. WORRYING TOO MUCH ABOUT DIFFERENT THINGS: NOT AT ALL
2. NOT BEING ABLE TO STOP OR CONTROL WORRYING: NOT AT ALL
IF YOU CHECKED OFF ANY PROBLEMS ON THIS QUESTIONNAIRE, HOW DIFFICULT HAVE THESE PROBLEMS MADE IT FOR YOU TO DO YOUR WORK, TAKE CARE OF THINGS AT HOME, OR GET ALONG WITH OTHER PEOPLE: NOT DIFFICULT AT ALL
5. BEING SO RESTLESS THAT IT IS HARD TO SIT STILL: NOT AT ALL
6. BECOMING EASILY ANNOYED OR IRRITABLE: NOT AT ALL

## 2021-04-29 ASSESSMENT — PATIENT HEALTH QUESTIONNAIRE - PHQ9: 5. POOR APPETITE OR OVEREATING: NOT AT ALL

## 2021-04-29 ASSESSMENT — PAIN SCALES - GENERAL: PAINLEVEL: MILD PAIN (2)

## 2021-04-29 NOTE — NURSING NOTE
"Coming in for a ? Sinus infection  Pressure on the face  Headache  Sore throat  Nasal congestion    Chief Complaint   Patient presents with     Sinus Problem     pressure headache, sore throat, face hurts, nasal congestion negative covid       Initial /64   Pulse 62   Temp 98  F (36.7  C)   Resp 14   Wt 63.7 kg (140 lb 6.4 oz)   LMP 04/27/2021 (Exact Date)   SpO2 98%   Breastfeeding No   BMI 23.36 kg/m   Estimated body mass index is 23.36 kg/m  as calculated from the following:    Height as of 9/10/20: 1.651 m (5' 5\").    Weight as of this encounter: 63.7 kg (140 lb 6.4 oz).  Medication Reconciliation: complete    Erin Franco LPN  "

## 2021-04-29 NOTE — PROGRESS NOTES
Assessment & Plan   Problem List Items Addressed This Visit     None      Visit Diagnoses     Viral URI with cough    -  Primary         Exam is reassuring at this time.  Of course, this could still be COVID with a prior false negative result.  Is engaged with employee health here for return to work.                  No follow-ups on file.    Chance Daly MD  [facility]    Temo Sher is a 30 year old who presents for the following health issues     HPI illness for 4 days.  No fevers.  Sore throat, tonsils enlarged, sinus pressure. Post nasal drip.  Hoarse voice until today. Also now having productive cough, with yellow sputum.  n blood.  Had severe allergic symptoms this year too.  Works here, had a negative COVID test yesterday.          Review of Systems         Objective    /64   Pulse 62   Temp 98  F (36.7  C)   Resp 14   Wt 63.7 kg (140 lb 6.4 oz)   LMP 04/27/2021 (Exact Date)   SpO2 98%   Breastfeeding No   BMI 23.36 kg/m    Body mass index is 23.36 kg/m .  Physical Exam  Constitutional:       Appearance: Normal appearance.   HENT:      Right Ear: Tympanic membrane normal.      Left Ear: Tympanic membrane normal.      Nose: Congestion and rhinorrhea present.      Mouth/Throat:      Mouth: Mucous membranes are moist.   Eyes:      Pupils: Pupils are equal, round, and reactive to light.   Cardiovascular:      Rate and Rhythm: Normal rate and regular rhythm.   Pulmonary:      Effort: Pulmonary effort is normal. No respiratory distress.      Breath sounds: No stridor.   Neurological:      Mental Status: She is alert.   Psychiatric:         Mood and Affect: Mood normal.         Behavior: Behavior normal.         Thought Content: Thought content normal.

## 2021-04-30 ASSESSMENT — ANXIETY QUESTIONNAIRES: GAD7 TOTAL SCORE: 0

## 2021-05-24 ENCOUNTER — RESULTS ONLY (OUTPATIENT)
Dept: LAB | Age: 31
End: 2021-05-24

## 2021-05-25 ENCOUNTER — MYC MEDICAL ADVICE (OUTPATIENT)
Dept: FAMILY MEDICINE | Facility: OTHER | Age: 31
End: 2021-05-25

## 2021-07-23 ENCOUNTER — OFFICE VISIT (OUTPATIENT)
Dept: FAMILY MEDICINE | Facility: OTHER | Age: 31
End: 2021-07-23
Attending: PHYSICIAN ASSISTANT
Payer: COMMERCIAL

## 2021-07-23 VITALS
RESPIRATION RATE: 18 BRPM | SYSTOLIC BLOOD PRESSURE: 122 MMHG | DIASTOLIC BLOOD PRESSURE: 82 MMHG | HEART RATE: 86 BPM | BODY MASS INDEX: 23.49 KG/M2 | OXYGEN SATURATION: 98 % | WEIGHT: 141 LBS | HEIGHT: 65 IN | TEMPERATURE: 97.4 F

## 2021-07-23 DIAGNOSIS — J32.0 CHRONIC MAXILLARY SINUSITIS: ICD-10-CM

## 2021-07-23 DIAGNOSIS — J03.90 TONSILLITIS: Primary | ICD-10-CM

## 2021-07-23 PROCEDURE — 99213 OFFICE O/P EST LOW 20 MIN: CPT | Performed by: PHYSICIAN ASSISTANT

## 2021-07-23 ASSESSMENT — MIFFLIN-ST. JEOR: SCORE: 1365.45

## 2021-07-23 ASSESSMENT — PAIN SCALES - GENERAL: PAINLEVEL: MODERATE PAIN (5)

## 2021-07-23 NOTE — PROGRESS NOTES
ASSESSMENT/PLAN:    I have reviewed the nursing notes.  I have reviewed the findings, diagnosis, plan and need for follow up with the patient.    (J03.90) Tonsillitis  (primary encounter diagnosis)  (J32.0) Chronic maxillary sinusitis  Comment: see below  Plan: amoxicillin-clavulanate (AUGMENTIN) 875-125 MG tablet        Vital signs stable. PE notable for posterior pharyngeal erythema and tonsil findings include: 3+ tonsillar hypertrophy, left with tonsil stones. Chronic maxillary sinusitis noted as well. Strep test deferred. Discussed that for tonsillitis we typically treat with antibiotics with PCN class being first line unless allergy present. Patient was placed on Augmentin x 10 d. Recommend taking entire course of antibiotic even if feeling better prior to this.  Recommend changing toothbrush on day 2.  Recommend alternating Tylenol and ibuprofen every 4-6 hours as needed (do not exceed daily limits of 4000 mg of Tylenol and 1200 mg of ibuprofen daily).  Also recommend salt water gargles, netti pots/sinus rinses, sudafed, humidifier, throat lozenges if old enough not to be a choking hazard, warm honey if greater than 12 months in age, other home remedies as needed.  May also benefit from using a humidifier.  If changing or worsening symptoms such as: Worsening fevers, pain, inability to handle own secretions, etc., recommend follow-up. Patient is in agreement and understanding of the above treatment plan. All questions and concerns were addressed and answered to patient's satisfaction. AVS reviewed with patient.    Discussed warning signs/symptoms indicative of need to f/u    Follow up if symptoms persist or worsen or concerns    I explained my diagnostic considerations and recommendations to the patient, who voiced understanding and agreement with the treatment plan. All questions were answered. We discussed potential side effects of any prescribed or recommended therapies, as well as expectations for response  to treatments.    Salena Quigley PA-C  7/23/2021  11:43 AM    HPI:    Nikky Singletary is a 30 year old female  who presents to [facility] today for concerns of URI, x off and on since April. She saw Dr. Daly in April and has been tested negative for COVID x2.     Symptoms:  No fevers or chills  Yes sore throat/pharyngitis/tonsillitis.   Yes allergy/URI Symptoms  No Muffled Sounds/Change in Hearing  No Sensation of Fullness in Ear(s)  No Ringing in Ears/Tinnitus  No Balance Changes  No Dizziness  Yes Congestion (head/nasal/chest)  Yes Cough/Productive Cough - green in color - began recently  Yes Post Nasal Drip - color: clear  No Headache  Yes Sinus Pain/Pressure  No Myalgias  No Otalgia  Activity Level Changes: Yes: fatigue  Appetite/Liquid Intake Changes: Yes: intermittent  Changes to Bowel Habits: No  Changes to Bladder Habits: No  Additional Symptoms to Report: No  History of similar symptoms: No  Prior workup: No    Treatments tried: Tylenol/Ibuprofen, Decongestants, OTC Cough med, Fluids and Rest. Flonase    Site of exposure: not known.  Type of exposure: not known    Other Pertinent History: no recurrent history, but has had pneumonia and bronchitis before. No history of strep, but history of tonsillitis.     PCP: MD Malika    Past Medical History:   Diagnosis Date     Acne, mild 11/18/2010     HEATH positive 7/11/2017     Attention deficit hyperactivity disorder (ADHD), combined type 2/1/2017    Overview:  Diagnosed in elementary age. Used some medications but cannot recall what at that time.      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 Followed at [facility] as a child.  Sees Dr Farmer twice a year     IBS (irritable bowel syndrome) 6/10/2015     Juvenile rheumatoid arthritis (H) 06/09/2014    Diagnosed at age 3 years, followed by [facility] as a child.  Follows Dr Farmer twice a year.     Myopia 3/23/2005     Tendinitis  "of extensor tendon of right hand 6/12/2018     Tendinitis of right elbow 2/1/2017     Varicella without complication     As a child     Past Surgical History:   Procedure Laterality Date     ARTHROSCOPY KNEE Left 10/2019     EXTRACTION(S) DENTAL       Social History     Tobacco Use     Smoking status: Never Smoker     Smokeless tobacco: Former User     Types: Chew     Tobacco comment: Quit smoking: Rarely, with driving, roomate smokes   Substance Use Topics     Alcohol use: Yes     Alcohol/week: 0.0 standard drinks     Comment: Alcoholic Drinks/day: occasional     Current Outpatient Medications   Medication Sig Dispense Refill     buPROPion (WELLBUTRIN SR) 150 MG 12 hr tablet TAKE 1 TABLET(150 MG) BY MOUTH EVERY MORNING 90 tablet 3     Etanercept (ENBREL SURECLICK) 50 MG/ML autoinjector Inject 50 mg Subcutaneous       metroNIDAZOLE (METROGEL) 0.75 % external gel Apply topically 2 times daily 45 g 11     montelukast (SINGULAIR) 10 MG tablet Take 1 tablet (10 mg) by mouth At Bedtime 90 tablet 3     mupirocin (BACTROBAN) 2 % external ointment Apply to nares twice daily 22 g 3     norgestimate-ethinyl estradiol (SPRINTEC 28) 0.25-35 MG-MCG tablet TAKE 1 TABLET BY MOUTH ONCE DAILY. 84 tablet 4     sulindac (CLINORIL) 200 MG tablet TAKE 1 TABLET(200 MG) BY MOUTH TWICE DAILY WITH MEALS 180 tablet 3     No Known Allergies  Past medical history, past surgical history, current medications and allergies reviewed and accurate to the best of my knowledge.      ROS:  Refer to HPI    /82 (BP Location: Right arm, Patient Position: Sitting, Cuff Size: Adult Regular)   Pulse 86   Temp 97.4  F (36.3  C) (Tympanic)   Resp 18   Ht 1.651 m (5' 5\")   Wt 64 kg (141 lb)   LMP 06/25/2021 (Approximate)   SpO2 98%   Breastfeeding No   BMI 23.46 kg/m      EXAM:  General Appearance: Well appearing 30-year old female, appropriate appearance for age. No acute distress  Ears: Left TM intact, translucent with bony landmarks " appreciated, no erythema, no effusion, no bulging, no purulence.  Right TM intact, translucent with bony landmarks appreciated, no erythema, no effusion, no bulging, no purulence.  Left auditory canal clear.  Right auditory canal clear.  Normal external ears, non tender.  Eyes: conjunctivae normal without erythema or irritation, corneas clear, no drainage or crusting, no eyelid swelling, pupils equal   Orophayrnx: moist mucous membranes, posterior pharynx without erythema, tonsils with left sided 3+ hypertrophy with tonsil stones noted and moderate to severe erythema, normal right sided tonsillar appearance/size/no erythema or exudates, no petechiae, no post nasal drip seen, no trismus, voice clear.    Sinuses:  Bilateral maxillary sinus tenderness  Nose:  Bilateral nares: no erythema, no edema, no drainage or congestion   Neck: supple without adenopathy  Respiratory: normal chest wall and respirations.  Normal effort.  Clear to auscultation bilaterally, no wheezing, crackles or rhonchi.  No increased work of breathing.  No cough appreciated.  Cardiac: RRR with no murmurs  Psychological: normal affect, alert, oriented, and pleasant.     Labs:  None     Xray:  None

## 2021-07-23 NOTE — NURSING NOTE
"Chief Complaint   Patient presents with     Sinus Problem     left tonsil swollen per patient, congested, off and on since april     /82 (BP Location: Right arm, Patient Position: Sitting, Cuff Size: Adult Regular)   Pulse 86   Temp 97.4  F (36.3  C) (Tympanic)   Resp 18   Ht 1.651 m (5' 5\")   Wt 64 kg (141 lb)   LMP 06/25/2021 (Approximate)   SpO2 98%   Breastfeeding No   BMI 23.46 kg/m        Medication Reconciliation: complete    Kalie Marcelo LPN    "

## 2021-07-23 NOTE — PATIENT INSTRUCTIONS
Please refer to your AVS for follow up and pain/symptoms management recommendations (I.e.: medications, helpful conservative treatment modalities, appropriate follow up if need to a specialist or family practice, etc.). Please return to urgent care if your symptoms change or worsen.     Discharge instructions:  -If you were prescribed a medication(s), please take this as prescribed/directed  -Monitor your symptoms, if changing/worsening, return to UC/ER or PCP for follow up     -If prescribed an antibiotic or medication - please take this as directed.   -For your symptoms, we recommend salt water gargles.   -Alternative ibuprofen and tylenol as needed.  -Using a humidifier may be beneficial as well.   -Return to ER/UC or your PCP for changing or worsening symptoms.   -Get new toothbrush after being on antibiotic for 2 days    You were prescribed an antibiotic, please take into consideration the following information:  - Take entire course of antibiotic even if you start to feel better.  - Antibiotics can cause stomach upset including nausea and diarrhea. Read your bottle or ask the pharmacist if antibiotic can be taken with food to help prevent nausea. If you have symptoms of diarrhea you can take an over-the-counter probiotic and/or increase foods with probiotics such as yogurt, Alexis, sauerkraut.  -Use caution in sunlight as can lead to increased risk of sunburn while on ABX (antibiotics).

## 2021-09-17 ENCOUNTER — OFFICE VISIT (OUTPATIENT)
Dept: FAMILY MEDICINE | Facility: OTHER | Age: 31
End: 2021-09-17
Attending: FAMILY MEDICINE
Payer: COMMERCIAL

## 2021-09-17 VITALS
TEMPERATURE: 98.2 F | DIASTOLIC BLOOD PRESSURE: 68 MMHG | SYSTOLIC BLOOD PRESSURE: 120 MMHG | HEART RATE: 109 BPM | BODY MASS INDEX: 23.93 KG/M2 | WEIGHT: 143.8 LBS | RESPIRATION RATE: 14 BRPM | OXYGEN SATURATION: 100 %

## 2021-09-17 DIAGNOSIS — F41.1 GAD (GENERALIZED ANXIETY DISORDER): ICD-10-CM

## 2021-09-17 DIAGNOSIS — F32.0 MILD MAJOR DEPRESSION (H): Primary | ICD-10-CM

## 2021-09-17 DIAGNOSIS — Z30.011 ENCOUNTER FOR INITIAL PRESCRIPTION OF CONTRACEPTIVE PILLS: ICD-10-CM

## 2021-09-17 PROBLEM — M25.631 DECREASED RANGE OF MOTION OF RIGHT WRIST: Status: RESOLVED | Noted: 2020-08-06 | Resolved: 2021-09-17

## 2021-09-17 PROBLEM — R29.898 DECREASED GRIP STRENGTH OF RIGHT HAND: Status: RESOLVED | Noted: 2020-08-06 | Resolved: 2021-09-17

## 2021-09-17 PROBLEM — R29.898 DECREASED PINCH STRENGTH: Status: RESOLVED | Noted: 2020-08-06 | Resolved: 2021-09-17

## 2021-09-17 PROCEDURE — 99214 OFFICE O/P EST MOD 30 MIN: CPT | Performed by: FAMILY MEDICINE

## 2021-09-17 RX ORDER — BUPROPION HYDROCHLORIDE 300 MG/1
300 TABLET ORAL EVERY MORNING
Qty: 90 TABLET | Refills: 3 | Status: SHIPPED | OUTPATIENT
Start: 2021-09-17 | End: 2021-11-30

## 2021-09-17 RX ORDER — NORGESTIMATE AND ETHINYL ESTRADIOL 0.25-0.035
KIT ORAL
Qty: 84 TABLET | Refills: 4 | Status: SHIPPED | OUTPATIENT
Start: 2021-09-17 | End: 2022-01-24

## 2021-09-17 RX ORDER — NORGESTIMATE AND ETHINYL ESTRADIOL 0.25-0.035
KIT ORAL
Qty: 84 TABLET | Refills: 4 | Status: SHIPPED | OUTPATIENT
Start: 2021-09-17 | End: 2021-09-17

## 2021-09-17 RX ORDER — BUPROPION HYDROCHLORIDE 300 MG/1
300 TABLET ORAL EVERY MORNING
Qty: 90 TABLET | Refills: 3 | Status: SHIPPED | OUTPATIENT
Start: 2021-09-17 | End: 2021-09-17

## 2021-09-17 ASSESSMENT — PATIENT HEALTH QUESTIONNAIRE - PHQ9
SUM OF ALL RESPONSES TO PHQ QUESTIONS 1-9: 8
10. IF YOU CHECKED OFF ANY PROBLEMS, HOW DIFFICULT HAVE THESE PROBLEMS MADE IT FOR YOU TO DO YOUR WORK, TAKE CARE OF THINGS AT HOME, OR GET ALONG WITH OTHER PEOPLE: VERY DIFFICULT
SUM OF ALL RESPONSES TO PHQ QUESTIONS 1-9: 8

## 2021-09-17 ASSESSMENT — ANXIETY QUESTIONNAIRES
4. TROUBLE RELAXING: SEVERAL DAYS
GAD7 TOTAL SCORE: 10
8. IF YOU CHECKED OFF ANY PROBLEMS, HOW DIFFICULT HAVE THESE MADE IT FOR YOU TO DO YOUR WORK, TAKE CARE OF THINGS AT HOME, OR GET ALONG WITH OTHER PEOPLE?: VERY DIFFICULT
7. FEELING AFRAID AS IF SOMETHING AWFUL MIGHT HAPPEN: SEVERAL DAYS
7. FEELING AFRAID AS IF SOMETHING AWFUL MIGHT HAPPEN: SEVERAL DAYS
GAD7 TOTAL SCORE: 10
1. FEELING NERVOUS, ANXIOUS, OR ON EDGE: MORE THAN HALF THE DAYS
3. WORRYING TOO MUCH ABOUT DIFFERENT THINGS: SEVERAL DAYS
5. BEING SO RESTLESS THAT IT IS HARD TO SIT STILL: MORE THAN HALF THE DAYS
2. NOT BEING ABLE TO STOP OR CONTROL WORRYING: SEVERAL DAYS
GAD7 TOTAL SCORE: 10
6. BECOMING EASILY ANNOYED OR IRRITABLE: MORE THAN HALF THE DAYS

## 2021-09-17 ASSESSMENT — PAIN SCALES - GENERAL: PAINLEVEL: NO PAIN (0)

## 2021-09-17 NOTE — PROGRESS NOTES
Assessment & Plan   Problem List Items Addressed This Visit        Behavioral    NEIDA (generalized anxiety disorder)    Relevant Medications    buPROPion (WELLBUTRIN XL) 300 MG 24 hr tablet    sertraline (ZOLOFT) 50 MG tablet      Other Visit Diagnoses     Mild major depression (H)    -  Primary    Relevant Medications    buPROPion (WELLBUTRIN XL) 300 MG 24 hr tablet    sertraline (ZOLOFT) 50 MG tablet    Encounter for initial prescription of contraceptive pills        Relevant Medications    norgestimate-ethinyl estradiol (SPRINTEC 28) 0.25-35 MG-MCG tablet         31 minutes in care.  Discussed with her options such as counseling, increasing the wellbutrin and adding on selective serotonin reuptake inhibitor.  She does not want stimulants yet nor ativan.  Start above meds, fun in 4 weeks.                 No follow-ups on file.    Chance Daly MD  [facility]    Temo Sher is a 30 year old who presents for the following health issues     HPI   Anxiety and depression. Has been on meds for over 10 years, off and on.  Now more anxious and depressed. Got  a few months ago, then uncle  suddenly from an MI.  Grandmother is in the hospital now too.  Other grandma  from COVID last year,  was just before her wedding.  Having a harder time focusing wall of this. Worried she might have ADD too.  Has ADHD on her problem list, but she is not sure how or when it was put on there.  Did not really have significant focusing issues in middle school some in high school.  Some counseling last year from anxiety.  Hoping to have kids in 1 year or so.     Stopped the wellbutrin in the early summer and felt fine for a few weeks.        Answers for HPI/ROS submitted by the patient on 2021  If you checked off any problems, how difficult have these problems made it for you to do your work, take care of things at home, or get along with other people?: Very difficult  PHQ9 TOTAL  SCORE: 8  NEIDA 7 TOTAL SCORE: 10      Current Outpatient Medications:      buPROPion (WELLBUTRIN XL) 300 MG 24 hr tablet, Take 1 tablet (300 mg) by mouth every morning, Disp: 90 tablet, Rfl: 3     Etanercept (ENBREL SURECLICK) 50 MG/ML autoinjector, Inject 50 mg Subcutaneous, Disp: , Rfl:      metroNIDAZOLE (METROGEL) 0.75 % external gel, Apply topically 2 times daily, Disp: 45 g, Rfl: 11     montelukast (SINGULAIR) 10 MG tablet, Take 1 tablet (10 mg) by mouth At Bedtime, Disp: 90 tablet, Rfl: 3     mupirocin (BACTROBAN) 2 % external ointment, Apply to nares twice daily, Disp: 22 g, Rfl: 3     norgestimate-ethinyl estradiol (SPRINTEC 28) 0.25-35 MG-MCG tablet, TAKE 1 TABLET BY MOUTH ONCE DAILY., Disp: 84 tablet, Rfl: 4     sertraline (ZOLOFT) 50 MG tablet, Take 1 tablet (50 mg) by mouth daily, Disp: 90 tablet, Rfl: 3     sulindac (CLINORIL) 200 MG tablet, TAKE 1 TABLET(200 MG) BY MOUTH TWICE DAILY WITH MEALS, Disp: 180 tablet, Rfl: 3              Review of Systems         Objective    /68   Pulse 109   Temp 98.2  F (36.8  C)   Resp 14   Wt 65.2 kg (143 lb 12.8 oz)   LMP 09/08/2021 (Exact Date)   SpO2 100%   BMI 23.93 kg/m    Body mass index is 23.93 kg/m .  Physical Exam  Constitutional:       Appearance: Normal appearance.   Neurological:      General: No focal deficit present.      Mental Status: She is alert and oriented to person, place, and time.   Psychiatric:         Mood and Affect: Mood normal.         Behavior: Behavior normal.         Thought Content: Thought content normal.

## 2021-09-18 ASSESSMENT — ANXIETY QUESTIONNAIRES: GAD7 TOTAL SCORE: 10

## 2021-09-18 ASSESSMENT — PATIENT HEALTH QUESTIONNAIRE - PHQ9: SUM OF ALL RESPONSES TO PHQ QUESTIONS 1-9: 8

## 2021-10-07 ENCOUNTER — IMMUNIZATION (OUTPATIENT)
Dept: FAMILY MEDICINE | Facility: OTHER | Age: 31
End: 2021-10-07
Attending: FAMILY MEDICINE

## 2021-10-07 PROCEDURE — 91300 PR COVID VAC PFIZER DIL RECON 30 MCG/0.3 ML IM: CPT

## 2021-10-07 PROCEDURE — 0004A PR COVID VAC PFIZER DIL RECON 30 MCG/0.3 ML IM: CPT

## 2021-10-15 ENCOUNTER — OFFICE VISIT (OUTPATIENT)
Dept: FAMILY MEDICINE | Facility: OTHER | Age: 31
End: 2021-10-15
Attending: FAMILY MEDICINE
Payer: COMMERCIAL

## 2021-10-15 VITALS
WEIGHT: 142 LBS | HEART RATE: 85 BPM | OXYGEN SATURATION: 98 % | SYSTOLIC BLOOD PRESSURE: 128 MMHG | BODY MASS INDEX: 23.63 KG/M2 | TEMPERATURE: 97.4 F | DIASTOLIC BLOOD PRESSURE: 78 MMHG | RESPIRATION RATE: 14 BRPM

## 2021-10-15 DIAGNOSIS — F41.1 GAD (GENERALIZED ANXIETY DISORDER): Primary | ICD-10-CM

## 2021-10-15 PROCEDURE — 99213 OFFICE O/P EST LOW 20 MIN: CPT | Performed by: FAMILY MEDICINE

## 2021-10-15 ASSESSMENT — ANXIETY QUESTIONNAIRES
6. BECOMING EASILY ANNOYED OR IRRITABLE: SEVERAL DAYS
5. BEING SO RESTLESS THAT IT IS HARD TO SIT STILL: SEVERAL DAYS
GAD7 TOTAL SCORE: 5
2. NOT BEING ABLE TO STOP OR CONTROL WORRYING: SEVERAL DAYS
3. WORRYING TOO MUCH ABOUT DIFFERENT THINGS: SEVERAL DAYS
1. FEELING NERVOUS, ANXIOUS, OR ON EDGE: SEVERAL DAYS
7. FEELING AFRAID AS IF SOMETHING AWFUL MIGHT HAPPEN: NOT AT ALL

## 2021-10-15 ASSESSMENT — PAIN SCALES - GENERAL: PAINLEVEL: NO PAIN (0)

## 2021-10-15 ASSESSMENT — PATIENT HEALTH QUESTIONNAIRE - PHQ9
SUM OF ALL RESPONSES TO PHQ QUESTIONS 1-9: 6
5. POOR APPETITE OR OVEREATING: NOT AT ALL

## 2021-10-15 NOTE — NURSING NOTE
"Coming in for a medication check up    Chief Complaint   Patient presents with     Recheck Medication     zoloft       Initial /78   Pulse 85   Temp 97.4  F (36.3  C)   Resp 14   Wt 64.4 kg (142 lb)   LMP 10/05/2021 (Approximate)   SpO2 98%   Breastfeeding No   BMI 23.63 kg/m   Estimated body mass index is 23.63 kg/m  as calculated from the following:    Height as of 7/23/21: 1.651 m (5' 5\").    Weight as of this encounter: 64.4 kg (142 lb).  Medication Reconciliation: complete.  FOOD SECURITY SCREENING QUESTIONS  Hunger Vital Signs:  Within the past 12 months we worried whether our food would run out before we got money to buy more. Never  Within the past 12 months the food we bought just didn't last and we didn't have money to get more. Never  Erin Franco LPN 10/15/2021 8:11 AM      Erin Franco LPN  "

## 2021-10-15 NOTE — PROGRESS NOTES
Assessment & Plan      (F41.1) NEIDA (generalized anxiety disorder)  (primary encounter diagnosis)  Comment:    Plan:        Patient is doing well on current medication. If she becomes unable to tolerate adverse effects, she has been instructed to come in for alternative or weaning off medication. Follow up in 6 months to a year.                  Return in about 1 year (around 10/15/2022).    Kali Dia MS3  [employer]    Chance Daly MD  [facility]    [facility]   Nikky is a 30 year old who presents for the following health issues     HPI   Follow up on new medication zoloft and increased buproprion. Tried taking them at night, but experienced insomnia. Switched to taking in the mornings. Overall she is feeling better. Still lacking in some motivation, but feels she is getting there. Side effect that she is experiencing night sweats and some GI upset, frequent loose stools. 3-4 loose stools in a day. She is also sweating a bit more throughout the day. Not interested in therapy at this time. Would like to stick with medication.           Review of Systems   Constitutional, HEENT, cardiovascular, pulmonary, gi and gu systems are negative, except as otherwise noted.      Objective    /78   Pulse 85   Temp 97.4  F (36.3  C)   Resp 14   Wt 64.4 kg (142 lb)   LMP 10/05/2021 (Approximate)   SpO2 98%   Breastfeeding No   BMI 23.63 kg/m    Body mass index is 23.63 kg/m .  Physical Exam   Not indicated        ----- Services Performed by a MEDICAL STUDENT in Presence of ATTENDING Physician-------

## 2021-10-16 ASSESSMENT — ANXIETY QUESTIONNAIRES: GAD7 TOTAL SCORE: 5

## 2021-11-13 ENCOUNTER — ALLIED HEALTH/NURSE VISIT (OUTPATIENT)
Dept: FAMILY MEDICINE | Facility: OTHER | Age: 31
End: 2021-11-13
Attending: FAMILY MEDICINE
Payer: COMMERCIAL

## 2021-11-13 DIAGNOSIS — Z20.822 COVID-19 RULED OUT: Primary | ICD-10-CM

## 2021-11-13 PROCEDURE — C9803 HOPD COVID-19 SPEC COLLECT: HCPCS

## 2021-11-13 PROCEDURE — U0005 INFEC AGEN DETEC AMPLI PROBE: HCPCS | Mod: ZL

## 2021-11-15 LAB — SARS-COV-2 RNA RESP QL NAA+PROBE: NEGATIVE

## 2021-11-29 ENCOUNTER — MYC MEDICAL ADVICE (OUTPATIENT)
Dept: FAMILY MEDICINE | Facility: OTHER | Age: 31
End: 2021-11-29
Payer: COMMERCIAL

## 2021-11-29 DIAGNOSIS — F32.0 MILD MAJOR DEPRESSION (H): Primary | ICD-10-CM

## 2021-11-30 RX ORDER — BUPROPION HYDROCHLORIDE 150 MG/1
150 TABLET ORAL EVERY MORNING
Qty: 90 TABLET | Refills: 4 | Status: SHIPPED | OUTPATIENT
Start: 2021-11-30 | End: 2022-01-24

## 2021-12-27 ENCOUNTER — ALLIED HEALTH/NURSE VISIT (OUTPATIENT)
Dept: FAMILY MEDICINE | Facility: OTHER | Age: 31
End: 2021-12-27
Attending: FAMILY MEDICINE
Payer: COMMERCIAL

## 2021-12-27 DIAGNOSIS — R53.83 FATIGUE: ICD-10-CM

## 2021-12-27 DIAGNOSIS — Z20.822 COVID-19 RULED OUT: Primary | ICD-10-CM

## 2021-12-27 PROCEDURE — C9803 HOPD COVID-19 SPEC COLLECT: HCPCS

## 2021-12-27 PROCEDURE — U0005 INFEC AGEN DETEC AMPLI PROBE: HCPCS | Mod: ZL

## 2021-12-27 NOTE — PROGRESS NOTES
Patient swabbed for COVID-19 testing.  Congestion fatigue  Dorinda Lay MA on 12/27/2021 at 9:20 AM

## 2021-12-28 LAB — SARS-COV-2 RNA RESP QL NAA+PROBE: NEGATIVE

## 2022-01-03 ENCOUNTER — ALLIED HEALTH/NURSE VISIT (OUTPATIENT)
Dept: FAMILY MEDICINE | Facility: OTHER | Age: 32
End: 2022-01-03
Attending: FAMILY MEDICINE
Payer: COMMERCIAL

## 2022-01-03 DIAGNOSIS — R05.9 COUGH: Primary | ICD-10-CM

## 2022-01-03 DIAGNOSIS — J02.9 SORE THROAT: ICD-10-CM

## 2022-01-03 DIAGNOSIS — Z20.822 EXPOSURE TO 2019 NOVEL CORONAVIRUS: ICD-10-CM

## 2022-01-03 PROCEDURE — C9803 HOPD COVID-19 SPEC COLLECT: HCPCS

## 2022-01-03 PROCEDURE — U0005 INFEC AGEN DETEC AMPLI PROBE: HCPCS | Mod: ZL

## 2022-01-03 NOTE — PROGRESS NOTES
Patient swabbed for COVID-19 testing.  Sore throat possible exposure  Jenna Tang MA on 1/3/2022 at 8:12 AM

## 2022-01-04 LAB — SARS-COV-2 RNA RESP QL NAA+PROBE: POSITIVE

## 2022-01-24 ENCOUNTER — MYC MEDICAL ADVICE (OUTPATIENT)
Dept: FAMILY MEDICINE | Facility: OTHER | Age: 32
End: 2022-01-24

## 2022-01-24 ENCOUNTER — OFFICE VISIT (OUTPATIENT)
Dept: FAMILY MEDICINE | Facility: OTHER | Age: 32
End: 2022-01-24
Attending: FAMILY MEDICINE
Payer: COMMERCIAL

## 2022-01-24 VITALS
OXYGEN SATURATION: 98 % | TEMPERATURE: 97.9 F | RESPIRATION RATE: 16 BRPM | BODY MASS INDEX: 23.83 KG/M2 | HEART RATE: 96 BPM | DIASTOLIC BLOOD PRESSURE: 68 MMHG | WEIGHT: 143.2 LBS | SYSTOLIC BLOOD PRESSURE: 118 MMHG

## 2022-01-24 DIAGNOSIS — F90.2 ATTENTION DEFICIT HYPERACTIVITY DISORDER (ADHD), COMBINED TYPE: ICD-10-CM

## 2022-01-24 DIAGNOSIS — F41.1 GAD (GENERALIZED ANXIETY DISORDER): Primary | ICD-10-CM

## 2022-01-24 PROCEDURE — 99213 OFFICE O/P EST LOW 20 MIN: CPT | Performed by: FAMILY MEDICINE

## 2022-01-24 RX ORDER — METHYLPHENIDATE HYDROCHLORIDE 10 MG/1
10 CAPSULE, EXTENDED RELEASE ORAL DAILY
Qty: 30 CAPSULE | Refills: 0 | Status: SHIPPED | OUTPATIENT
Start: 2022-01-24 | End: 2022-01-25

## 2022-01-24 ASSESSMENT — ANXIETY QUESTIONNAIRES
4. TROUBLE RELAXING: NEARLY EVERY DAY
GAD7 TOTAL SCORE: 11
2. NOT BEING ABLE TO STOP OR CONTROL WORRYING: NOT AT ALL
7. FEELING AFRAID AS IF SOMETHING AWFUL MIGHT HAPPEN: SEVERAL DAYS
GAD7 TOTAL SCORE: 11
3. WORRYING TOO MUCH ABOUT DIFFERENT THINGS: MORE THAN HALF THE DAYS
6. BECOMING EASILY ANNOYED OR IRRITABLE: SEVERAL DAYS
5. BEING SO RESTLESS THAT IT IS HARD TO SIT STILL: NEARLY EVERY DAY
GAD7 TOTAL SCORE: 11
7. FEELING AFRAID AS IF SOMETHING AWFUL MIGHT HAPPEN: SEVERAL DAYS
1. FEELING NERVOUS, ANXIOUS, OR ON EDGE: SEVERAL DAYS

## 2022-01-24 ASSESSMENT — PATIENT HEALTH QUESTIONNAIRE - PHQ9
SUM OF ALL RESPONSES TO PHQ QUESTIONS 1-9: 13
SUM OF ALL RESPONSES TO PHQ QUESTIONS 1-9: 13
10. IF YOU CHECKED OFF ANY PROBLEMS, HOW DIFFICULT HAVE THESE PROBLEMS MADE IT FOR YOU TO DO YOUR WORK, TAKE CARE OF THINGS AT HOME, OR GET ALONG WITH OTHER PEOPLE: EXTREMELY DIFFICULT

## 2022-01-24 ASSESSMENT — PAIN SCALES - GENERAL: PAINLEVEL: NO PAIN (0)

## 2022-01-24 NOTE — NURSING NOTE
"Coming in to talk about medication    Chief Complaint   Patient presents with     Recheck Medication     change in medication/ anxiety       Initial /68   Pulse 96   Temp 97.9  F (36.6  C)   Resp 16   Wt 65 kg (143 lb 3.2 oz)   LMP 12/29/2021 (Approximate)   SpO2 98%   Breastfeeding No   BMI 23.83 kg/m   Estimated body mass index is 23.83 kg/m  as calculated from the following:    Height as of 7/23/21: 1.651 m (5' 5\").    Weight as of this encounter: 65 kg (143 lb 3.2 oz).  Medication Reconciliation: complete.  FOOD SECURITY SCREENING QUESTIONS  Hunger Vital Signs:  Within the past 12 months we worried whether our food would run out before we got money to buy more. Never  Within the past 12 months the food we bought just didn't last and we didn't have money to get more. Never  Erin Franco LPN 1/24/2022 10:51 AM      Erin Franco LPN  "

## 2022-01-24 NOTE — PROGRESS NOTES
Assessment & Plan   Problem List Items Addressed This Visit        Behavioral    Attention deficit hyperactivity disorder (ADHD), combined type    Relevant Medications    methylphenidate (RITALIN LA) 10 MG 24 hr capsule    NEIDA (generalized anxiety disorder) - Primary         Added on the Ritalin, follow up in 4 weeks.  If this helps then would do 3 months at a time.             Depression Screening Follow Up    PHQ 1/24/2022   PHQ-9 Total Score 13   Q9: Thoughts of better off dead/self-harm past 2 weeks Not at all         Follow Up Actions Taken  fu w me in 4 weeks     Regular exercise    Return in about 4 weeks (around 2/21/2022).    Chance Daly MD  [facility]   Nikky is a 31 year old who presents for the following health issues     HPI Answers for HPI/ROS submitted by the patient on 1/24/2022  If you checked off any problems, how difficult have these problems made it for you to do your work, take care of things at home, or get along with other people?: Extremely difficult  PHQ9 TOTAL SCORE: 13  NEIDA 7 TOTAL SCORE: 11    More anxiety lately and feels her meds are not working well.  2 deaths in the family recently, which has flared her depression.  Since stopping the Wellbutrin she feels she is crying a a lot more.  Actually feels the crying is good for her and she felt somewhat numb on it.  Low motivation, anxious overall still.  Depression is improved she feels, despite higher PHQ.  Some natural grief in the last 2 weeks.  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 She is not seeing a counselor, but feels it is not needed at this point.  likes the sertaline side effects compared to other meds.  Stopped her OCPs, in preparation for pregnancy.   Has been diagnosed with ADD, as a child. has never done stimulants but wants to try.  Continues to have a hard time focusing both at home as well as at work.    PHQ 9/17/2021 10/15/2021 1/24/2022    PHQ-9 Total Score 8 6 13   Q9: Thoughts of better off dead/self-harm past 2 weeks Not at all Not at all Not at all     NEIDA-7 SCORE 9/17/2021 10/15/2021 1/24/2022   Total Score 10 (moderate anxiety) - 11 (moderate anxiety)   Total Score 10 5 11                 Review of Systems         Objective    /68   Pulse 96   Temp 97.9  F (36.6  C)   Resp 16   Wt 65 kg (143 lb 3.2 oz)   LMP 12/29/2021 (Approximate)   SpO2 98%   Breastfeeding No   BMI 23.83 kg/m    Body mass index is 23.83 kg/m .  Physical Exam  Constitutional:       Appearance: Normal appearance.   Neurological:      General: No focal deficit present.      Mental Status: She is alert and oriented to person, place, and time.   Psychiatric:         Mood and Affect: Mood normal.         Behavior: Behavior normal.         Thought Content: Thought content normal.

## 2022-01-25 RX ORDER — METHYLPHENIDATE HYDROCHLORIDE 10 MG/1
10 CAPSULE, EXTENDED RELEASE ORAL DAILY
Qty: 30 CAPSULE | Refills: 0 | Status: SHIPPED | OUTPATIENT
Start: 2022-01-25 | End: 2022-02-23

## 2022-01-25 ASSESSMENT — ANXIETY QUESTIONNAIRES: GAD7 TOTAL SCORE: 11

## 2022-01-25 ASSESSMENT — PATIENT HEALTH QUESTIONNAIRE - PHQ9: SUM OF ALL RESPONSES TO PHQ QUESTIONS 1-9: 13

## 2022-02-23 ENCOUNTER — OFFICE VISIT (OUTPATIENT)
Dept: FAMILY MEDICINE | Facility: OTHER | Age: 32
End: 2022-02-23
Attending: FAMILY MEDICINE
Payer: COMMERCIAL

## 2022-02-23 VITALS
RESPIRATION RATE: 16 BRPM | BODY MASS INDEX: 24.26 KG/M2 | HEART RATE: 97 BPM | TEMPERATURE: 98.1 F | DIASTOLIC BLOOD PRESSURE: 66 MMHG | OXYGEN SATURATION: 100 % | WEIGHT: 145.8 LBS | SYSTOLIC BLOOD PRESSURE: 112 MMHG

## 2022-02-23 DIAGNOSIS — F32.0 MILD MAJOR DEPRESSION (H): Primary | ICD-10-CM

## 2022-02-23 DIAGNOSIS — F90.2 ATTENTION DEFICIT HYPERACTIVITY DISORDER (ADHD), COMBINED TYPE: ICD-10-CM

## 2022-02-23 PROCEDURE — 99213 OFFICE O/P EST LOW 20 MIN: CPT | Performed by: FAMILY MEDICINE

## 2022-02-23 RX ORDER — METHYLPHENIDATE HYDROCHLORIDE 10 MG/1
10 CAPSULE, EXTENDED RELEASE ORAL DAILY
Qty: 30 CAPSULE | Refills: 0 | Status: SHIPPED | OUTPATIENT
Start: 2022-02-23 | End: 2022-06-13

## 2022-02-23 RX ORDER — BUPROPION HYDROCHLORIDE 150 MG/1
150 TABLET ORAL EVERY MORNING
Qty: 90 TABLET | Refills: 4 | Status: SHIPPED | OUTPATIENT
Start: 2022-02-23 | End: 2023-03-01

## 2022-02-23 ASSESSMENT — ANXIETY QUESTIONNAIRES
5. BEING SO RESTLESS THAT IT IS HARD TO SIT STILL: SEVERAL DAYS
GAD7 TOTAL SCORE: 5
3. WORRYING TOO MUCH ABOUT DIFFERENT THINGS: SEVERAL DAYS
GAD7 TOTAL SCORE: 5
7. FEELING AFRAID AS IF SOMETHING AWFUL MIGHT HAPPEN: NOT AT ALL
2. NOT BEING ABLE TO STOP OR CONTROL WORRYING: NOT AT ALL
7. FEELING AFRAID AS IF SOMETHING AWFUL MIGHT HAPPEN: NOT AT ALL
6. BECOMING EASILY ANNOYED OR IRRITABLE: SEVERAL DAYS
1. FEELING NERVOUS, ANXIOUS, OR ON EDGE: SEVERAL DAYS
4. TROUBLE RELAXING: SEVERAL DAYS
GAD7 TOTAL SCORE: 5

## 2022-02-23 ASSESSMENT — PAIN SCALES - GENERAL: PAINLEVEL: NO PAIN (0)

## 2022-02-23 ASSESSMENT — PATIENT HEALTH QUESTIONNAIRE - PHQ9
SUM OF ALL RESPONSES TO PHQ QUESTIONS 1-9: 10
SUM OF ALL RESPONSES TO PHQ QUESTIONS 1-9: 9
SUM OF ALL RESPONSES TO PHQ QUESTIONS 1-9: 10
10. IF YOU CHECKED OFF ANY PROBLEMS, HOW DIFFICULT HAVE THESE PROBLEMS MADE IT FOR YOU TO DO YOUR WORK, TAKE CARE OF THINGS AT HOME, OR GET ALONG WITH OTHER PEOPLE: VERY DIFFICULT

## 2022-02-23 NOTE — PROGRESS NOTES
Assessment & Plan     (F32.0) Mild major depression (H)  (primary encounter diagnosis)  Comment: she is really bothered by the sweating.  Had hoped stopping wellbutrin would help this, but has not so she wants to go back on it and stop zolft.  Again, advised seeing a counselor especially for the anxiety portion.    Plan: buPROPion (WELLBUTRIN XL) 150 MG 24 hr tablet             (F90.2) Attention deficit hyperactivity disorder (ADHD), combined type  Comment: mild  Plan: methylphenidate (RITALIN LA) 10 MG 24 hr         capsule        Refilled this.               Depression Screening Follow Up    PHQ 2/23/2022   PHQ-9 Total Score 10   Q9: Thoughts of better off dead/self-harm past 2 weeks Not at all         Follow Up Actions Taken  Mental Health Referral placed         Return in about 4 weeks (around 3/23/2022).    Chance Daly MD  [facility]   Nikky is a 31 year old who presents for the following health issues     History of Present Illness       Mental Health Follow-up:  Patient presents to follow-up on Depression & Anxiety.Patient's depression since last visit has been:  No change  The patient is not having other symptoms associated with depression.  Patient's anxiety since last visit has been:  No change  The patient is not having other symptoms associated with anxiety.  Any significant life events: grief or loss  Patient is not feeling anxious or having panic attacks.  Patient has no concerns about alcohol or drug use.     Social History  Tobacco Use    Smoking status: Never Smoker    Smokeless tobacco: Former User      Types: Chew    Tobacco comment: Quit smoking: Rarely, with driving, roomate smokes  Vaping Use    Vaping Use: Never used  Alcohol use: Yes    Alcohol/week: 0.0 standard drinks    Comment: Alcoholic Drinks/day: occasional  Drug use: No      Today's PHQ-9         PHQ-9 Total Score:     (P) 10   PHQ-9 Q9 Thoughts of better off dead/self-harm past 2 weeks :   (P)  Not at all   Thoughts of suicide or self harm:      Self-harm Plan:        Self-harm Action:          Safety concerns for self or others:           She eats 0-1 servings of fruits and vegetables daily.She consumes 1 sweetened beverage(s) daily.She exercises with enough effort to increase her heart rate 20 to 29 minutes per day.  She exercises with enough effort to increase her heart rate 3 or less days per week.   She is taking medications regularly.       Medication Followup of ritalin and zoloft    Taking Medication as prescribed: yes    Side Effects:  None    Medication Helping Symptoms:  NO     Has not really noted much improvement in anything. Ritalin has not helped her pay attention much. Lots of night sweats too, she feels from Zoloft mostly.  Is not seeing a counselor. Off wellbutrin she feels she has more normal emotional responses.   Has stopped her OCP, just  but not actively trying to get pregnant.    PHQ 1/24/2022 2/23/2022 2/23/2022   PHQ-9 Total Score 13 9 10   Q9: Thoughts of better off dead/self-harm past 2 weeks Not at all Not at all Not at all     NEIDA-7 SCORE 10/15/2021 1/24/2022 2/23/2022   Total Score - 11 (moderate anxiety) 5 (mild anxiety)   Total Score 5 11 5               Review of Systems         Objective    /66   Pulse 97   Temp 98.1  F (36.7  C)   Resp 16   Wt 66.1 kg (145 lb 12.8 oz)   LMP 02/22/2022 (Exact Date)   SpO2 100%   Breastfeeding No   BMI 24.26 kg/m    Body mass index is 24.26 kg/m .  Physical Exam  Constitutional:       Appearance: Normal appearance.   Neurological:      General: No focal deficit present.      Mental Status: She is alert and oriented to person, place, and time.   Psychiatric:         Mood and Affect: Mood normal.         Behavior: Behavior normal.         Thought Content: Thought content normal.                        Answers for HPI/ROS submitted by the patient on 2/23/2022  If you checked off any problems, how difficult have these  problems made it for you to do your work, take care of things at home, or get along with other people?: Very difficult  NEIDA 7 TOTAL SCORE: 5    Conflicting answers have been found for some questions. Please document the patient's answers manually.

## 2022-02-24 ASSESSMENT — ANXIETY QUESTIONNAIRES: GAD7 TOTAL SCORE: 5

## 2022-02-24 ASSESSMENT — PATIENT HEALTH QUESTIONNAIRE - PHQ9: SUM OF ALL RESPONSES TO PHQ QUESTIONS 1-9: 10

## 2022-03-13 ASSESSMENT — ANXIETY QUESTIONNAIRES
4. TROUBLE RELAXING: NOT AT ALL
GAD7 TOTAL SCORE: 5
7. FEELING AFRAID AS IF SOMETHING AWFUL MIGHT HAPPEN: NOT AT ALL
GAD7 TOTAL SCORE: 5
5. BEING SO RESTLESS THAT IT IS HARD TO SIT STILL: SEVERAL DAYS
7. FEELING AFRAID AS IF SOMETHING AWFUL MIGHT HAPPEN: NOT AT ALL
6. BECOMING EASILY ANNOYED OR IRRITABLE: SEVERAL DAYS
GAD7 TOTAL SCORE: 5
3. WORRYING TOO MUCH ABOUT DIFFERENT THINGS: SEVERAL DAYS
1. FEELING NERVOUS, ANXIOUS, OR ON EDGE: SEVERAL DAYS
2. NOT BEING ABLE TO STOP OR CONTROL WORRYING: SEVERAL DAYS

## 2022-03-13 ASSESSMENT — PATIENT HEALTH QUESTIONNAIRE - PHQ9
SUM OF ALL RESPONSES TO PHQ QUESTIONS 1-9: 5
SUM OF ALL RESPONSES TO PHQ QUESTIONS 1-9: 5
10. IF YOU CHECKED OFF ANY PROBLEMS, HOW DIFFICULT HAVE THESE PROBLEMS MADE IT FOR YOU TO DO YOUR WORK, TAKE CARE OF THINGS AT HOME, OR GET ALONG WITH OTHER PEOPLE: SOMEWHAT DIFFICULT

## 2022-03-14 ENCOUNTER — OFFICE VISIT (OUTPATIENT)
Dept: FAMILY MEDICINE | Facility: OTHER | Age: 32
End: 2022-03-14
Attending: FAMILY MEDICINE
Payer: COMMERCIAL

## 2022-03-14 VITALS
HEART RATE: 66 BPM | BODY MASS INDEX: 25.26 KG/M2 | WEIGHT: 151.8 LBS | RESPIRATION RATE: 16 BRPM | DIASTOLIC BLOOD PRESSURE: 60 MMHG | TEMPERATURE: 97.3 F | SYSTOLIC BLOOD PRESSURE: 108 MMHG | OXYGEN SATURATION: 99 %

## 2022-03-14 DIAGNOSIS — F41.1 GAD (GENERALIZED ANXIETY DISORDER): Primary | ICD-10-CM

## 2022-03-14 DIAGNOSIS — F90.2 ATTENTION DEFICIT HYPERACTIVITY DISORDER (ADHD), COMBINED TYPE: ICD-10-CM

## 2022-03-14 PROCEDURE — 99214 OFFICE O/P EST MOD 30 MIN: CPT | Performed by: FAMILY MEDICINE

## 2022-03-14 RX ORDER — METHYLPHENIDATE HYDROCHLORIDE 10 MG/1
10 CAPSULE, EXTENDED RELEASE ORAL DAILY
Qty: 30 CAPSULE | Refills: 0 | Status: SHIPPED | OUTPATIENT
Start: 2022-04-14 | End: 2022-05-14

## 2022-03-14 RX ORDER — METHYLPHENIDATE HYDROCHLORIDE 10 MG/1
10 CAPSULE, EXTENDED RELEASE ORAL DAILY
Qty: 30 CAPSULE | Refills: 0 | Status: SHIPPED | OUTPATIENT
Start: 2022-05-15 | End: 2022-05-31

## 2022-03-14 RX ORDER — METHYLPHENIDATE HYDROCHLORIDE 10 MG/1
10 CAPSULE, EXTENDED RELEASE ORAL DAILY
Qty: 30 CAPSULE | Refills: 0 | Status: SHIPPED | OUTPATIENT
Start: 2022-03-14 | End: 2022-04-13

## 2022-03-14 ASSESSMENT — PAIN SCALES - GENERAL: PAINLEVEL: NO PAIN (0)

## 2022-03-14 ASSESSMENT — PATIENT HEALTH QUESTIONNAIRE - PHQ9
10. IF YOU CHECKED OFF ANY PROBLEMS, HOW DIFFICULT HAVE THESE PROBLEMS MADE IT FOR YOU TO DO YOUR WORK, TAKE CARE OF THINGS AT HOME, OR GET ALONG WITH OTHER PEOPLE: SOMEWHAT DIFFICULT
SUM OF ALL RESPONSES TO PHQ QUESTIONS 1-9: 5

## 2022-03-14 ASSESSMENT — ANXIETY QUESTIONNAIRES: GAD7 TOTAL SCORE: 5

## 2022-03-14 NOTE — PROGRESS NOTES
Assessment & Plan     (F41.1) NEIDA (generalized anxiety disorder)  (primary encounter diagnosis)  Comment: much improved.  Wellbutrin is not likely to impact this, so perhaps it is helping more with the below issue.  Her other interventions like exercising, are helping   Plan:      (F90.2) Attention deficit hyperactivity disorder (ADHD), combined type  Comment: stable now.  Refilled this for 3 months.  reviewed and stable.  Plan: methylphenidate (RITALIN LA) 10 MG 24 hr         capsule, methylphenidate (RITALIN LA) 10 MG 24         hr capsule, methylphenidate (RITALIN LA) 10 MG         24 hr capsule                            No follow-ups on file.    Chance Daly MD  [facility] AND [facility]    Subjective   Nikky is a 31 year old who presents for the following health issues     History of Present Illness       Mental Health Follow-up:  Patient presents to follow-up on Depression & Anxiety.Patient's depression since last visit has been:  Good  The patient is not having other symptoms associated with depression.  Patient's anxiety since last visit has been:  Good  The patient is not having other symptoms associated with anxiety.  Any significant life events: No  Patient is not feeling anxious or having panic attacks.  Patient has no concerns about alcohol or drug use.       Today's PHQ-9         PHQ-9 Total Score: 5  PHQ-9 Q9 Thoughts of better off dead/self-harm past 2 weeks :   (P) Not at all    How difficult have these problems made it for you to do your work, take care of things at home, or get along with other people: Somewhat difficult    Today's NEIDA-7 Score: 5    She eats 0-1 servings of fruits and vegetables daily.She consumes 2 sweetened beverage(s) daily.She exercises with enough effort to increase her heart rate 30 to 60 minutes per day.  She exercises with enough effort to increase her heart rate 4 days per week.   She is taking medications regularly.     PHQ 2/23/2022 2/23/2022 3/13/2022    PHQ-9 Total Score 9 10 5   Q9: Thoughts of better off dead/self-harm past 2 weeks Not at all Not at all Not at all       Medication Followup of  Ritalin and wellbutrin    Taking Medication as prescribed: yes    Side Effects:  None    Medication Helping Symptoms:  yes     Her motivation seems quite a bit better. Has been gong to the gym now.  Since stopping the Zoloft her night sweats are now resolved. Would like to stay on the ritalin. Feels it has helped her complete tasks.  Memory has improved with this too.      Is getting severe headaches now from her menses. [place]. Missed work one day, was not responding to NSAIDS that day. Usually it does. Midol helps.  This is all since stopping her OCPs.        Review of Systems         Objective    /60   Pulse 66   Temp 97.3  F (36.3  C)   Resp 16   Wt 68.9 kg (151 lb 12.8 oz)   LMP 02/22/2022 (Exact Date)   SpO2 99%   BMI 25.26 kg/m    Body mass index is 25.26 kg/m .  Physical Exam  Constitutional:       Appearance: Normal appearance.   Neurological:      General: No focal deficit present.      Mental Status: She is alert and oriented to person, place, and time.   Psychiatric:         Mood and Affect: Mood normal.         Behavior: Behavior normal.         Thought Content: Thought content normal.

## 2022-03-14 NOTE — NURSING NOTE
"Chief Complaint   Patient presents with     Recheck Medication     check       Initial /60   Pulse 66   Temp 97.3  F (36.3  C)   Resp 16   Wt 68.9 kg (151 lb 12.8 oz)   LMP 02/22/2022 (Exact Date)   SpO2 99%   BMI 25.26 kg/m   Estimated body mass index is 25.26 kg/m  as calculated from the following:    Height as of 7/23/21: 1.651 m (5' 5\").    Weight as of this encounter: 68.9 kg (151 lb 12.8 oz).  Medication Reconciliation: complete.  FOOD SECURITY SCREENING QUESTIONS  Hunger Vital Signs:  Within the past 12 months we worried whether our food would run out before we got money to buy more. Never  Within the past 12 months the food we bought just didn't last and we didn't have money to get more. Never  Erin Franco LPN 3/14/2022 8:31 AM      Erin Franco LPN  " Refill approved, need to do PHQ 9 over phone / f/u on depression over phone as he did not come for office visit as recommended.    Veronica Pederson MD.   Family Physician.  [facility].

## 2022-05-10 NOTE — NURSING NOTE
"Coming in for concerns and health issues    Chief Complaint   Patient presents with     Patient Request     talk about mental health issues       Initial /68   Pulse 109   Temp 98.2  F (36.8  C)   Resp 14   Wt 65.2 kg (143 lb 12.8 oz)   LMP 09/08/2021 (Exact Date)   SpO2 100%   BMI 23.93 kg/m   Estimated body mass index is 23.93 kg/m  as calculated from the following:    Height as of 7/23/21: 1.651 m (5' 5\").    Weight as of this encounter: 65.2 kg (143 lb 12.8 oz).  Medication Reconciliation: complete.  FOOD SECURITY SCREENING QUESTIONS  Hunger Vital Signs:  Within the past 12 months we worried whether our food would run out before we got money to buy more. Never  Within the past 12 months the food we bought just didn't last and we didn't have money to get more. Never  Erin Franco LPN 9/17/2021 8:27 AM      Erin Franco LPN  " Patient's first and last name, , procedure, and correct site confirmed prior to the start of procedure.

## 2022-05-21 ENCOUNTER — HEALTH MAINTENANCE LETTER (OUTPATIENT)
Age: 32
End: 2022-05-21

## 2022-05-31 ENCOUNTER — MYC REFILL (OUTPATIENT)
Dept: FAMILY MEDICINE | Facility: OTHER | Age: 32
End: 2022-05-31

## 2022-05-31 DIAGNOSIS — F90.2 ATTENTION DEFICIT HYPERACTIVITY DISORDER (ADHD), COMBINED TYPE: ICD-10-CM

## 2022-06-03 RX ORDER — METHYLPHENIDATE HYDROCHLORIDE 10 MG/1
10 CAPSULE, EXTENDED RELEASE ORAL DAILY
Qty: 30 CAPSULE | Refills: 0 | Status: SHIPPED | OUTPATIENT
Start: 2022-06-03 | End: 2022-10-07

## 2022-06-11 ASSESSMENT — ANXIETY QUESTIONNAIRES
GAD7 TOTAL SCORE: 8
GAD7 TOTAL SCORE: 8
8. IF YOU CHECKED OFF ANY PROBLEMS, HOW DIFFICULT HAVE THESE MADE IT FOR YOU TO DO YOUR WORK, TAKE CARE OF THINGS AT HOME, OR GET ALONG WITH OTHER PEOPLE?: SOMEWHAT DIFFICULT
7. FEELING AFRAID AS IF SOMETHING AWFUL MIGHT HAPPEN: NOT AT ALL
2. NOT BEING ABLE TO STOP OR CONTROL WORRYING: SEVERAL DAYS
4. TROUBLE RELAXING: SEVERAL DAYS
5. BEING SO RESTLESS THAT IT IS HARD TO SIT STILL: MORE THAN HALF THE DAYS
7. FEELING AFRAID AS IF SOMETHING AWFUL MIGHT HAPPEN: NOT AT ALL
1. FEELING NERVOUS, ANXIOUS, OR ON EDGE: MORE THAN HALF THE DAYS
6. BECOMING EASILY ANNOYED OR IRRITABLE: SEVERAL DAYS
GAD7 TOTAL SCORE: 8
3. WORRYING TOO MUCH ABOUT DIFFERENT THINGS: SEVERAL DAYS

## 2022-06-11 ASSESSMENT — PATIENT HEALTH QUESTIONNAIRE - PHQ9
SUM OF ALL RESPONSES TO PHQ QUESTIONS 1-9: 6
SUM OF ALL RESPONSES TO PHQ QUESTIONS 1-9: 6
10. IF YOU CHECKED OFF ANY PROBLEMS, HOW DIFFICULT HAVE THESE PROBLEMS MADE IT FOR YOU TO DO YOUR WORK, TAKE CARE OF THINGS AT HOME, OR GET ALONG WITH OTHER PEOPLE: SOMEWHAT DIFFICULT

## 2022-06-13 ENCOUNTER — OFFICE VISIT (OUTPATIENT)
Dept: FAMILY MEDICINE | Facility: OTHER | Age: 32
End: 2022-06-13
Attending: FAMILY MEDICINE
Payer: COMMERCIAL

## 2022-06-13 VITALS
RESPIRATION RATE: 16 BRPM | HEART RATE: 88 BPM | OXYGEN SATURATION: 99 % | DIASTOLIC BLOOD PRESSURE: 62 MMHG | TEMPERATURE: 97.5 F | WEIGHT: 153.4 LBS | SYSTOLIC BLOOD PRESSURE: 122 MMHG | BODY MASS INDEX: 25.56 KG/M2 | HEIGHT: 65 IN

## 2022-06-13 DIAGNOSIS — F41.1 GAD (GENERALIZED ANXIETY DISORDER): Primary | ICD-10-CM

## 2022-06-13 DIAGNOSIS — F90.2 ATTENTION DEFICIT HYPERACTIVITY DISORDER (ADHD), COMBINED TYPE: ICD-10-CM

## 2022-06-13 PROCEDURE — 99214 OFFICE O/P EST MOD 30 MIN: CPT | Performed by: FAMILY MEDICINE

## 2022-06-13 RX ORDER — METHYLPHENIDATE HYDROCHLORIDE 10 MG/1
10 CAPSULE, EXTENDED RELEASE ORAL DAILY
Qty: 30 CAPSULE | Refills: 0 | Status: CANCELLED | OUTPATIENT
Start: 2022-06-13

## 2022-06-13 RX ORDER — METHYLPHENIDATE HYDROCHLORIDE 10 MG/1
10 CAPSULE, EXTENDED RELEASE ORAL DAILY
Qty: 30 CAPSULE | Refills: 0 | Status: SHIPPED | OUTPATIENT
Start: 2022-08-14 | End: 2022-09-08

## 2022-06-13 RX ORDER — METHYLPHENIDATE HYDROCHLORIDE 10 MG/1
10 CAPSULE, EXTENDED RELEASE ORAL DAILY
Qty: 30 CAPSULE | Refills: 0 | Status: SHIPPED | OUTPATIENT
Start: 2022-06-13 | End: 2022-07-13

## 2022-06-13 RX ORDER — METHYLPHENIDATE HYDROCHLORIDE 10 MG/1
10 CAPSULE, EXTENDED RELEASE ORAL DAILY
Qty: 30 CAPSULE | Refills: 0 | Status: SHIPPED | OUTPATIENT
Start: 2022-07-14 | End: 2022-08-13

## 2022-06-13 ASSESSMENT — ANXIETY QUESTIONNAIRES: GAD7 TOTAL SCORE: 8

## 2022-06-13 ASSESSMENT — PATIENT HEALTH QUESTIONNAIRE - PHQ9
SUM OF ALL RESPONSES TO PHQ QUESTIONS 1-9: 6
10. IF YOU CHECKED OFF ANY PROBLEMS, HOW DIFFICULT HAVE THESE PROBLEMS MADE IT FOR YOU TO DO YOUR WORK, TAKE CARE OF THINGS AT HOME, OR GET ALONG WITH OTHER PEOPLE: SOMEWHAT DIFFICULT

## 2022-06-13 ASSESSMENT — PAIN SCALES - GENERAL: PAINLEVEL: NO PAIN (0)

## 2022-06-13 NOTE — PROGRESS NOTES
"  Assessment & Plan     (F41.1) NEIDA (generalized anxiety disorder)  (primary encounter diagnosis)  Comment: discussed with her boxed breathing. Suggested revisiting counseling again.  Plan: add in prozac at 20 milligram and follow up in 4-6 weeks    (F90.2) Attention deficit hyperactivity disorder (ADHD), combined type  Comment: stable.   reviewed.  Plan: refilled ritalin LA 10 milligram daily, 3 month supply given.             BMI:   Estimated body mass index is 25.53 kg/m  as calculated from the following:    Height as of this encounter: 1.651 m (5' 5\").    Weight as of this encounter: 69.6 kg (153 lb 6.4 oz).   Weight management plan: Discussed healthy diet and exercise guidelines        Return in about 6 months (around 12/13/2022).    Chance Daly MD  [facility]    Temo Sher is a 31 year old who presents for the following health issues     History of Present Illness       Mental Health Follow-up:  Patient presents to follow-up on Anxiety.    Patient's anxiety since last visit has been:  Worse  The patient is not having other symptoms associated with anxiety.  Any significant life events: No  Patient is feeling anxious or having panic attacks.  Patient has no concerns about alcohol or drug use.    She eats 0-1 servings of fruits and vegetables daily.She consumes 1 sweetened beverage(s) daily.She exercises with enough effort to increase her heart rate 20 to 29 minutes per day.  She exercises with enough effort to increase her heart rate 3 or less days per week.   She is taking medications regularly.    Today's PHQ-9         PHQ-9 Total Score: 6    PHQ-9 Q9 Thoughts of better off dead/self-harm past 2 weeks :   Not at all    How difficult have these problems made it for you to do your work, take care of things at home, or get along with other people: Somewhat difficult  Today's NEIDA-7 Score: 8       Medication Followup of Ritalin    Taking Medication as prescribed: yes    Side " "Effects:  None    Medication Helping Symptoms:  yes    Anxiety worsening.  No new changes in her life.  Marriage and jobs are all going well.  Wants to try a different selective serotonin reuptake inhibitor.  Zoloft caused night sweats. Is trying to conceive, LMP was 3 weeks ago.     Also wants a refill on Ritalin. No side effects. Ran out and off it has had significant issues with focusing, short attention span.       Current Outpatient Medications   Medication     buPROPion (WELLBUTRIN XL) 150 MG 24 hr tablet     Etanercept (ENBREL SURECLICK) 50 MG/ML autoinjector     FLUoxetine (PROZAC) 20 MG capsule     methylphenidate (RITALIN LA) 10 MG 24 hr capsule     [START ON 7/14/2022] methylphenidate (RITALIN LA) 10 MG 24 hr capsule     [START ON 8/14/2022] methylphenidate (RITALIN LA) 10 MG 24 hr capsule     methylphenidate (RITALIN LA) 10 MG 24 hr capsule     metroNIDAZOLE (METROGEL) 0.75 % external gel     montelukast (SINGULAIR) 10 MG tablet     mupirocin (BACTROBAN) 2 % external ointment     sulindac (CLINORIL) 200 MG tablet     No current facility-administered medications for this visit.           Review of Systems         Objective    /62 (BP Location: Right arm, Patient Position: Sitting, Cuff Size: Adult Regular)   Pulse 88   Temp 97.5  F (36.4  C) (Tympanic)   Resp 16   Ht 1.651 m (5' 5\")   Wt 69.6 kg (153 lb 6.4 oz)   LMP 05/13/2022   SpO2 99%   BMI 25.53 kg/m    Body mass index is 25.53 kg/m .  Physical Exam  Constitutional:       Appearance: Normal appearance.   Neurological:      General: No focal deficit present.      Mental Status: She is alert and oriented to person, place, and time.   Psychiatric:         Mood and Affect: Mood normal.         Behavior: Behavior normal.         Thought Content: Thought content normal.                        "

## 2022-06-13 NOTE — NURSING NOTE
"Chief Complaint   Patient presents with     Recheck Medication     Medication management.        Initial /62 (BP Location: Right arm, Patient Position: Sitting, Cuff Size: Adult Regular)   Pulse 88   Temp 97.5  F (36.4  C) (Tympanic)   Resp 16   Ht 1.651 m (5' 5\")   Wt 69.6 kg (153 lb 6.4 oz)   LMP 05/13/2022   SpO2 99%   BMI 25.53 kg/m   Estimated body mass index is 25.53 kg/m  as calculated from the following:    Height as of this encounter: 1.651 m (5' 5\").    Weight as of this encounter: 69.6 kg (153 lb 6.4 oz).  Medication Reconciliation: complete    Mary Lou Jacobs LPN    Advance Care Directive reviewed    "

## 2022-07-18 ASSESSMENT — ENCOUNTER SYMPTOMS
EYE PAIN: 0
CHILLS: 0
PALPITATIONS: 1
FEVER: 0
CONSTIPATION: 0
PARESTHESIAS: 0
DYSURIA: 0
DIARRHEA: 1
SHORTNESS OF BREATH: 0
HEMATOCHEZIA: 0
DIZZINESS: 0
MYALGIAS: 0
BREAST MASS: 0
HEADACHES: 0
NAUSEA: 0
NERVOUS/ANXIOUS: 1
FREQUENCY: 0
HEMATURIA: 0
COUGH: 0
HEARTBURN: 0
SORE THROAT: 0
WEAKNESS: 0
JOINT SWELLING: 0
ABDOMINAL PAIN: 0
ARTHRALGIAS: 1

## 2022-07-25 ENCOUNTER — OFFICE VISIT (OUTPATIENT)
Dept: FAMILY MEDICINE | Facility: OTHER | Age: 32
End: 2022-07-25
Attending: FAMILY MEDICINE
Payer: COMMERCIAL

## 2022-07-25 VITALS
HEIGHT: 64 IN | SYSTOLIC BLOOD PRESSURE: 118 MMHG | TEMPERATURE: 97.5 F | RESPIRATION RATE: 16 BRPM | BODY MASS INDEX: 25.61 KG/M2 | WEIGHT: 150 LBS | HEART RATE: 82 BPM | OXYGEN SATURATION: 100 % | DIASTOLIC BLOOD PRESSURE: 66 MMHG

## 2022-07-25 DIAGNOSIS — Z00.00 ROUTINE GENERAL MEDICAL EXAMINATION AT A HEALTH CARE FACILITY: Primary | ICD-10-CM

## 2022-07-25 PROCEDURE — 91305 COVID-19,PF,PFIZER (12+ YRS): CPT | Performed by: FAMILY MEDICINE

## 2022-07-25 PROCEDURE — 99395 PREV VISIT EST AGE 18-39: CPT | Mod: 25 | Performed by: FAMILY MEDICINE

## 2022-07-25 PROCEDURE — 0054A COVID-19,PF,PFIZER (12+ YRS): CPT | Performed by: FAMILY MEDICINE

## 2022-07-25 ASSESSMENT — ENCOUNTER SYMPTOMS
NERVOUS/ANXIOUS: 1
FREQUENCY: 0
SHORTNESS OF BREATH: 0
CHILLS: 0
BREAST MASS: 0
HEMATOCHEZIA: 0
ARTHRALGIAS: 1
JOINT SWELLING: 0
DIZZINESS: 0
COUGH: 0
HEMATURIA: 0
HEADACHES: 0
MYALGIAS: 0
DYSURIA: 0
ABDOMINAL PAIN: 0
EYE PAIN: 0
NAUSEA: 0
WEAKNESS: 0
PALPITATIONS: 1
PARESTHESIAS: 0
SORE THROAT: 0
HEARTBURN: 0
DIARRHEA: 1
FEVER: 0
CONSTIPATION: 0

## 2022-07-25 ASSESSMENT — PAIN SCALES - GENERAL: PAINLEVEL: NO PAIN (0)

## 2022-07-25 NOTE — PATIENT INSTRUCTIONS
Preventive Health Recommendations  Female Ages 26 - 39  Yearly exam:   See your health care provider every year in order to  Review health changes.   Discuss preventive care.    Review your medicines if you your doctor has prescribed any.    Until age 30: Get a Pap test every three years (more often if you have had an abnormal result).    After age 30: Talk to your doctor about whether you should have a Pap test every 3 years or have a Pap test with HPV screening every 5 years.   You do not need a Pap test if your uterus was removed (hysterectomy) and you have not had cancer.  You should be tested each year for STDs (sexually transmitted diseases), if you're at risk.   Talk to your provider about how often to have your cholesterol checked.  If you are at risk for diabetes, you should have a diabetes test (fasting glucose).  Shots: Get a flu shot each year. Get a tetanus shot every 10 years.   Nutrition:   Eat at least 5 servings of fruits and vegetables each day.  Eat whole-grain bread, whole-wheat pasta and brown rice instead of white grains and rice.  Get adequate Calcium and Vitamin D.     Lifestyle  Exercise at least 150 minutes a week (30 minutes a day, 5 days of the week). This will help you control your weight and prevent disease.  Limit alcohol to one drink per day.  No smoking.   Wear sunscreen to prevent skin cancer.  See your dentist every six months for an exam and cleaning.    [place] counselors/therapists   Telephone Hours Kids? Address   [facility]  (Many counselors) [phone] M-Th 8-5  F 8-12 Yes [address]   [email or web address]   [facility]  (Many counselors) [phone]  Yes [address] [facility] [address] [place]   [email or web address]   [facility]  (Many counselors) [phone]  Yes [facility] [place]  [email or web address]   [facility]  Krish Mendoza [phone]  Yes [facility]  [address] [facility]  MIL[employer]  [email or web address]   [facility] [facility]  Kirit Babak [phone]  Yes [address]   [address]  [email or web address]   Serena Sinclair [phone]   [address]  [email or web address]   [facility]  Renaldo Quiroz  Za [place]hryn Brandin [phone]   [address] [facility] [address]  [email or web address]   Anne-Marie Virk [phone]   [address]   Oumou Kellie [phone]   [address] [place] [address]   Michelle Allen [phone]  Yes [address]   Mary Williamson [phone]   [address] [place]    Lewis Jas [phone]   [address] [place] [address]   Radha Mac [phone]   [address] [place] [address]   [email or web address]   Carmen Francisco [phone]   [address] [place] [address] [address]  ([facility])  [email or web address]   [facility]  Darius Nugent [phone]   [address] [facility]  Michele Rinne Cindy Thomas [phone]      [facility]: Ignacio [phone]  Yes ROSANNA Pierre  [address]  [email or web address]   [facility]: [place] [phone]  Yes ROSANNA Daigle  [address] [facility]  [email or web address]

## 2022-07-25 NOTE — PROGRESS NOTES
SUBJECTIVE:   CC: Nikky Hurt is an 31 year old woman who presents for preventive health visit.       Patient has been advised of split billing requirements and indicates understanding: Yes  Healthy Habits:     Getting at least 3 servings of Calcium per day:  NO    Bi-annual eye exam:  Yes    Dental care twice a year:  Yes    Sleep apnea or symptoms of sleep apnea:  Daytime drowsiness    Diet:  Regular (no restrictions) and Breakfast skipped    Frequency of exercise:  1 day/week    Duration of exercise:  15-30 minutes    Taking medications regularly:  No    Barriers to taking medications:  Problems remembering to take them    Medication side effects:  None    PHQ-2 Total Score: 1    Additional concerns today:  Yes              Today's PHQ-2 Score:   PHQ-2 ( 1999 [employer]) 7/18/2022   Q1: Little interest or pleasure in doing things 1   Q2: Feeling down, depressed or hopeless 0   PHQ-2 Score 1   PHQ-2 Total Score (12-17 Years)- Positive if 3 or more points; Administer PHQ-A if positive -   Q1: Little interest or pleasure in doing things Several days   Q2: Feeling down, depressed or hopeless Not at all   PHQ-2 Score 1       Abuse: Current or Past (Physical, Sexual or Emotional) - No  Do you feel safe in your environment? Yes    Have you ever done Advance Care Planning? (For example, a Health Directive, POLST, or a discussion with a medical provider or your loved ones about your wishes): No, advance care planning information given to patient to review.  Patient declined advance care planning discussion at this time.    Social History     Tobacco Use     Smoking status: Never Smoker     Smokeless tobacco: Former User     Types: Chew     Tobacco comment: Quit smoking: Rarely, with driving, roomate smokes   Substance Use Topics     Alcohol use: Yes     Alcohol/week: 0.0 standard drinks     Comment: Alcoholic Drinks/day: occasional     If you drink alcohol do you typically have >3 drinks per day or >7 drinks per week?  No    Alcohol Use 7/18/2022   Prescreen: >3 drinks/day or >7 drinks/week? No       Reviewed orders with patient.  Reviewed health maintenance and updated orders accordingly - Yes  Labs reviewed [facility]  Current Outpatient Medications   Medication Sig Dispense Refill     buPROPion (WELLBUTRIN XL) 150 MG 24 hr tablet Take 1 tablet (150 mg) by mouth every morning 90 tablet 4     Etanercept (ENBREL SURECLICK) 50 MG/ML autoinjector Inject 50 mg Subcutaneous       methylphenidate (RITALIN LA) 10 MG 24 hr capsule Take 1 capsule (10 mg) by mouth daily for 30 days 30 capsule 0     [START ON 8/14/2022] methylphenidate (RITALIN LA) 10 MG 24 hr capsule Take 1 capsule (10 mg) by mouth daily for 30 days 30 capsule 0     methylphenidate (RITALIN LA) 10 MG 24 hr capsule Take 1 capsule (10 mg) by mouth daily 30 capsule 0     metroNIDAZOLE (METROGEL) 0.75 % external gel Apply topically 2 times daily 45 g 11     montelukast (SINGULAIR) 10 MG tablet Take 1 tablet (10 mg) by mouth At Bedtime 90 tablet 3     mupirocin (BACTROBAN) 2 % external ointment Apply to nares twice daily 22 g 3     sulindac (CLINORIL) 200 MG tablet TAKE 1 TABLET(200 MG) BY MOUTH TWICE DAILY WITH MEALS 180 tablet 3     No Known Allergies    Breast Cancer Screening:  Any new diagnosis of family breast, ovarian, or bowel cancer? No    FHS-7: No flowsheet data found.      Pertinent mammograms are reviewed under the imaging tab.    History of abnormal Pap smear: NO - age 30- 65 PAP every 3 years recommended  PAP / HPV 2/24/2020   PAP (Historical) NIL     Reviewed and updated as needed this visit by clinical staff   Tobacco  Allergies  Meds   Med Hx  Surg Hx  Fam Hx  Soc Hx          Reviewed and updated as needed this visit by Provider                   Past Medical History:   Diagnosis Date     Acne, mild 11/18/2010     HEATH positive 7/11/2017     Attention deficit hyperactivity disorder (ADHD), combined type 2/1/2017    Overview:  Diagnosed in elementary age.  Used some medications but cannot recall what at that time.      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 Followed at [facility] as a child.  Sees Dr Farmer twice a year     IBS (irritable bowel syndrome) 6/10/2015     Juvenile rheumatoid arthritis (H) 06/09/2014    Diagnosed at age 3 years, followed by [facility] as a child.  Follows Dr Farmer twice a year.     Myopia 3/23/2005     Tendinitis of extensor tendon of right hand 6/12/2018     Tendinitis of right elbow 2/1/2017     Varicella without complication     As a child      Past Surgical History:   Procedure Laterality Date     ARTHROSCOPY KNEE Left 10/2019     EXTRACTION(S) DENTAL         Review of Systems   Constitutional: Negative for chills and fever.   HENT: Positive for congestion. Negative for ear pain, hearing loss and sore throat.    Eyes: Negative for pain and visual disturbance.   Respiratory: Negative for cough and shortness of breath.    Cardiovascular: Positive for palpitations. Negative for chest pain and peripheral edema.   Gastrointestinal: Positive for diarrhea. Negative for abdominal pain, constipation, heartburn, hematochezia and nausea.   Breasts:  Positive for tenderness. Negative for breast mass and discharge.   Genitourinary: Negative for dysuria, frequency, genital sores, hematuria, pelvic pain, urgency, vaginal bleeding and vaginal discharge.   Musculoskeletal: Positive for arthralgias. Negative for joint swelling and myalgias.   Skin: Negative for rash.   Neurological: Negative for dizziness, weakness, headaches and paresthesias.   Psychiatric/Behavioral: Positive for mood changes. The patient is nervous/anxious.      Stopped OCPs 7 months ago.  Had a later period last month, then a bit heavier.  Suspects it was a miscarriage.      Stopped the prozac  Feels significant fatigue with low motivation.  Stopped this last week, with increased alertness  "since.         OBJECTIVE:   /66   Pulse 82   Temp 97.5  F (36.4  C) (Tympanic)   Resp 16   Ht 1.632 m (5' 4.25\")   Wt 68 kg (150 lb)   LMP 07/20/2022 (Exact Date)   SpO2 100%   Breastfeeding No   BMI 25.55 kg/m    Physical Exam  GENERAL: healthy, alert and no distress  EYES: Eyes grossly normal to inspection, PERRL and conjunctivae and sclerae normal  HENT: ear canals and TM's normal, nose and mouth without ulcers or lesions  NECK: no adenopathy, no asymmetry, masses, or scars and thyroid normal to palpation  RESP: lungs clear to auscultation - no rales, rhonchi or wheezes  CV: regular rate and rhythm, normal S1 S2, no S3 or S4, no murmur, click or rub, no peripheral edema and peripheral pulses strong  ABDOMEN: soft, nontender, no hepatosplenomegaly, no masses and bowel sounds normal  MS: no gross musculoskeletal defects noted, no edema  SKIN: no suspicious lesions or rashes  NEURO: Normal strength and tone, mentation intact and speech normal  PSYCH: mentation appears normal, affect normal/bright    Diagnostic Test Results:  Labs reviewed in Epic    ASSESSMENT/PLAN:       ICD-10-CM    1. Routine general medical examination at a health care facility  Z00.00            COUNSELING:  Reviewed preventive health counseling, as reflected in patient instructions       Regular exercise       Healthy diet/nutrition    Estimated body mass index is 25.55 kg/m  as calculated from the following:    Height as of this encounter: 1.632 m (5' 4.25\").    Weight as of this encounter: 68 kg (150 lb).    Weight management plan: Discussed healthy diet and exercise guidelines    She reports that she has never smoked. She has quit using smokeless tobacco.  Her smokeless tobacco use included chew.      Counseling Resources:  ATP IV Guidelines  Pooled Cohorts Equation Calculator  Breast Cancer Risk Calculator  BRCA-Related Cancer Risk Assessment: FHS-7 Tool  FRAX Risk Assessment  ICSI Preventive Guidelines  Dietary Guidelines " for Americans, 2010  USDA's MyPlate  ASA Prophylaxis  Lung CA Screening    Chance Daly MD  [facility] AND [facility]

## 2022-07-25 NOTE — NURSING NOTE
"Chief Complaint   Patient presents with     Physical     Recheck Medication       Initial /66   Pulse 82   Temp 97.5  F (36.4  C) (Tympanic)   Resp 16   Ht 1.632 m (5' 4.25\")   Wt 68 kg (150 lb)   LMP 07/19/2022 (Approximate)   SpO2 100%   Breastfeeding No   BMI 25.55 kg/m   Estimated body mass index is 25.55 kg/m  as calculated from the following:    Height as of this encounter: 1.632 m (5' 4.25\").    Weight as of this encounter: 68 kg (150 lb).  Medication Reconciliation: complete    FOOD SECURITY SCREENING QUESTIONS  Hunger Vital Signs:  Within the past 12 months we worried whether our food would run out before we got money to buy more. Never  Within the past 12 months the food we bought just didn't last and we didn't have money to get more. Never  Olga Brito LPN 7/25/2022 8:03 AM    Do you have a healthcare directive? No      "

## 2022-07-28 ENCOUNTER — TELEPHONE (OUTPATIENT)
Dept: FAMILY MEDICINE | Facility: OTHER | Age: 32
End: 2022-07-28

## 2022-07-28 ENCOUNTER — MYC MEDICAL ADVICE (OUTPATIENT)
Dept: FAMILY MEDICINE | Facility: OTHER | Age: 32
End: 2022-07-28

## 2022-07-28 ENCOUNTER — NURSE TRIAGE (OUTPATIENT)
Dept: FAMILY MEDICINE | Facility: OTHER | Age: 32
End: 2022-07-28

## 2022-07-28 DIAGNOSIS — Z53.9 ERRONEOUS ENCOUNTER--DISREGARD: Primary | ICD-10-CM

## 2022-07-28 NOTE — TELEPHONE ENCOUNTER
"Pt reporting mild symptoms of a sore throat at this time, instructed to care for this at home, if symptoms worsen or do not start improving to call back or to seek tx at the [facility] if after hours.  Oumou Medina RN on 7/28/2022 at 10:16 AM      Reason for Disposition    Sore throat    Additional Information    Negative: Severe difficulty breathing (e.g., struggling for each breath, speaks in single words)    Negative: Sounds like a life-threatening emergency to the triager    Negative: Throat culture results, call about    Negative: Productive cough is the main symptom    Negative: Runny nose is the main symptom    Negative: Drooling or spitting out saliva (because can't swallow)    Negative: Unable to open mouth completely    Negative: Drinking very little and has signs of dehydration (e.g., no urine > 12 hours, very dry mouth, very lightheaded)    Negative: Patient sounds very sick or weak to the triager    Negative: Difficulty breathing (per caller) but not severe    Negative: Fever > 103 F (39.4 C)    Negative: Refuses to drink anything for > 12 hours    Negative: SEVERE sore throat pain    Negative: Pus on tonsils (back of throat) and swollen neck lymph nodes ('glands')    Negative: Earache also present    Negative: Widespread rash (especially chest and abdomen)    Negative: Diabetes mellitus or weak immune system (e.g., HIV positive, cancer chemo, splenectomy, organ transplant, chronic steroids)    Negative: History of rheumatic fever    Negative: Patient wants to be seen    Negative: Fever present > 3 days (72 hours)    Negative: Patient requesting a strep throat test    Negative: Strep exposure within last 10 days    Negative: Sore throat is the main symptom and persists > 48 hours    Negative: Sore throat with cough/cold symptoms present > 5 days    Answer Assessment - Initial Assessment Questions  1. ONSET: \"When did the throat start hurting?\" (Hours or days ago)       Yesterday 7/27/22  2. " "SEVERITY: \"How bad is the sore throat?\" (Scale 1-10; mild, moderate or severe)    - MILD (1-3):  doesn't interfere with eating or normal activities    - MODERATE (4-7): interferes with eating some solids and normal activities    - SEVERE (8-10):  excruciating pain, interferes with most normal activities    - SEVERE DYSPHAGIA: can't swallow liquids, drooling      Irritating with swallowing  3. STREP EXPOSURE: \"Has there been any exposure to strep within the past week?\" If so, ask: \"What type of contact occurred?\"       No  4.  VIRAL SYMPTOMS: \"Are there any symptoms of a cold, such as a runny nose, cough, hoarse voice or red eyes?\"       Mild cough  5. FEVER: \"Do you have a fever?\" If so, ask: \"What is your temperature, how was it measured, and when did it start?\"      No, tested negative at home test for covid19  6. PUS ON THE TONSILS: \"Is there pus on the tonsils in the back of your throat?\"      No  7. OTHER SYMPTOMS: \"Do you have any other symptoms?\" (e.g., difficulty breathing, headache, rash)      No  8. PREGNANCY: \"Is there any chance you are pregnant?\" \"When was your last menstrual period?\"      No    Protocols used: SORE THROAT-A-OH      "

## 2022-07-28 NOTE — TELEPHONE ENCOUNTER
See triage call  Pt instructed to call back if symptoms do not improve or seek tx at the [facility] if after hours, pt in agreement.  Oumou Medina RN on 7/28/2022 at 10:18 AM

## 2022-09-07 ENCOUNTER — OFFICE VISIT (OUTPATIENT)
Dept: FAMILY MEDICINE | Facility: OTHER | Age: 32
End: 2022-09-07
Attending: PHYSICIAN ASSISTANT
Payer: COMMERCIAL

## 2022-09-07 VITALS
WEIGHT: 154.8 LBS | DIASTOLIC BLOOD PRESSURE: 86 MMHG | OXYGEN SATURATION: 100 % | RESPIRATION RATE: 16 BRPM | SYSTOLIC BLOOD PRESSURE: 118 MMHG | HEIGHT: 65 IN | TEMPERATURE: 97 F | BODY MASS INDEX: 25.79 KG/M2 | HEART RATE: 82 BPM

## 2022-09-07 DIAGNOSIS — S76.112A STRAIN OF LEFT QUADRICEPS, INITIAL ENCOUNTER: Primary | ICD-10-CM

## 2022-09-07 PROCEDURE — 99213 OFFICE O/P EST LOW 20 MIN: CPT | Performed by: PHYSICIAN ASSISTANT

## 2022-09-07 RX ORDER — PREDNISONE 10 MG/1
40 TABLET ORAL DAILY
Qty: 30 TABLET | Refills: 0 | Status: SHIPPED | OUTPATIENT
Start: 2022-09-07 | End: 2022-09-12

## 2022-09-07 ASSESSMENT — PAIN SCALES - GENERAL: PAINLEVEL: SEVERE PAIN (7)

## 2022-09-07 NOTE — PATIENT INSTRUCTIONS
Please refer to your AVS for follow up and pain/symptoms management recommendations (I.e.: medications, helpful conservative treatment modalities, appropriate follow up if need to a specialist or family practice, etc.). Please return to urgent care if your symptoms change or worsen.     Discharge instructions:  -If you were prescribed a medication(s), please take this as prescribed/directed  -Monitor your symptoms, if changing/worsening, return to UC/ER or PCP for follow up    For pain control - we recommend alternating Tylenol and Ibuprofen if you are able to take these medications. Alternate every 4 hours as needed. I.e.: Ibuprofen at 8am, Tylenol 12pm, Ibuprofen 4pm    -Daily maximum of Tylenol is 4000mg (recommend staying under 3000mg)   -Daily maximum of Ibuprofen is 3200 mg  - Heat, ice, gentle stretching (among other remedies: biofreeze/icy hot, etc).     Take Prednisone in the morning with food

## 2022-09-07 NOTE — PROGRESS NOTES
ASSESSMENT/PLAN:    I have reviewed the nursing notes.  I have reviewed the findings, diagnosis, plan and need for follow up with the patient.    1. Strain of left quadriceps, initial encounter  - Physical Therapy Referral; Future  - predniSONE (DELTASONE) 10 MG tablet; Take 4 tablets (40 mg) by mouth daily for 5 days  Dispense: 30 tablet; Refill: 0  - Vital signs stable.  Physical exam consistent with strain/overuse syndrome of left quadriceps musculature. VMO weakness noted bilaterally on examination. Discussed that stains are typically self-limiting and will heal in 4 to 8 weeks duration of time.  Recommend alternating Tylenol and ibuprofen every 4-6 hours if able, do not exceed daily limits as reviewed on AVS (4000 mg of Tylenol daily, 3200 mg of ibuprofen daily), alternate heat and ice, gentle range of motion as tolerated. She has utilized Prednisone in the past for RA/flare up of symptoms, short course was prescribed today, take in AM with food. I provided her with a stretching regimen and also placed a PT referral - PT will contact patient to schedule. Recommend to avoid overuse in the interim until symptoms resolve. Patient runs into any difficulties/setbacks during recovery they should follow-up with her PCP or orthopedics for reevaluation. Patient is in agreement and understanding of the above treatment plan. All questions and concerns were addressed and answered to patient's satisfaction. AVS reviewed with patient.     Discussed warning signs/symptoms indicative of need to f/u    Follow up if symptoms persist or worsen or concerns    I explained my diagnostic considerations and recommendations to the patient, who voiced understanding and agreement with the treatment plan. All questions were answered. We discussed potential side effects of any prescribed or recommended therapies, as well as expectations for response to treatments.    Salena Quigley PA-C  9/7/2022  6:27 PM    HPI:    Nikky Hurt is a  "31 year old female  who [facility] concerns of left leg pain. Pain is primarily in the following regions: quad, ankle and knee. She attributes knee and ankle pain due to gait compensation.     Etiology to symptoms: summer softball league, she has played 2 weeks ago and notes she felt \"like normal\", at her last game 8 days ago, she is unsure of any injuries, however, when trying to run she was unable to complete her run to 1st base and she had onset of pain in both of her quads. She states after a few days, her symptoms in her right leg resolved.    Current symptoms LLE:   + tingling to level of all toes at times, tends to run down lateral aspect of leg. No numbness or burning. Pain is worse with prolonged sitting. Mild clicking - with rotation of ankle - has had this prior. Mild edema, no ecchymosis, no erythema or calor. No abrasions/skin related injuries.     Treatments: stretching, Ibuprofen (does not seem to help). Ice helps briefly.     Pertinent history: + rheumatoid arthritis, onset at age 4, IM Etanercept injections weekly. She had a left knee arthroscopy  - lateral release, patellofemoral debridement. in 10/2019 - [facility]. She completed physical therapy after this.     PCP: MD Malika    Past Medical History:   Diagnosis Date     Acne, mild 11/18/2010     HEATH positive 7/11/2017     Attention deficit hyperactivity disorder (ADHD), combined type 2/1/2017    Overview:  Diagnosed in elementary age. Used some medications but cannot recall what at that time.      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 Followed at [facility] as a child.  Sees Dr Farmer twice a year     IBS (irritable bowel syndrome) 6/10/2015     Juvenile rheumatoid arthritis (H) 06/09/2014    Diagnosed at age 3 years, followed by [facility] as a child.  Follows Dr Farmer twice a year.     Myopia 3/23/2005     Tendinitis of extensor tendon of " "right hand 6/12/2018     Tendinitis of right elbow 2/1/2017     Varicella without complication     As a child     Past Surgical History:   Procedure Laterality Date     ARTHROSCOPY KNEE Left 10/2019     EXTRACTION(S) DENTAL       Social History     Tobacco Use     Smoking status: Never Smoker     Smokeless tobacco: Former User     Types: Chew     Tobacco comment: Quit smoking: Rarely, with driving, roomate smokes   Substance Use Topics     Alcohol use: Yes     Alcohol/week: 0.0 standard drinks     Comment: Alcoholic Drinks/day: occasional     Current Outpatient Medications   Medication Sig Dispense Refill     buPROPion (WELLBUTRIN XL) 150 MG 24 hr tablet Take 1 tablet (150 mg) by mouth every morning 90 tablet 4     Etanercept (ENBREL SURECLICK) 50 MG/ML autoinjector Inject 50 mg Subcutaneous       methylphenidate (RITALIN LA) 10 MG 24 hr capsule Take 1 capsule (10 mg) by mouth daily for 30 days 30 capsule 0     methylphenidate (RITALIN LA) 10 MG 24 hr capsule Take 1 capsule (10 mg) by mouth daily 30 capsule 0     metroNIDAZOLE (METROGEL) 0.75 % external gel Apply topically 2 times daily 45 g 11     montelukast (SINGULAIR) 10 MG tablet Take 1 tablet (10 mg) by mouth At Bedtime 90 tablet 3     mupirocin (BACTROBAN) 2 % external ointment Apply to nares twice daily 22 g 3     sulindac (CLINORIL) 200 MG tablet TAKE 1 TABLET(200 MG) BY MOUTH TWICE DAILY WITH MEALS 180 tablet 3     No Known Allergies  Past medical history, past surgical history, current medications and allergies reviewed and accurate to the best of my knowledge.      ROS:  Refer to HPI    /86 (BP Location: Right arm, Patient Position: Sitting, Cuff Size: Adult Regular)   Pulse 82   Temp 97  F (36.1  C) (Tympanic)   Resp 16   Ht 1.651 m (5' 5\")   Wt 70.2 kg (154 lb 12.8 oz)   LMP 08/31/2022   SpO2 100%   BMI 25.76 kg/m      EXAM:  General Appearance: Well appearing 31 year old female, appropriate appearance for age. No acute distress "   Respiratory: normal chest wall and respirations.  Normal effort.  Clear to auscultation bilaterally, no wheezing, crackles or rhonchi.  No increased work of breathing.  No cough appreciated.  Cardiac: RRR with no murmurs  MSK:  Bilateral Hip PHYSICAL EXAMINATION:  General Hip Exam: no signs of deformity. Skin intact, no signs of skin changes and/or previous trauma to the hip. Leg length appears to be equal bilaterally.     Gait: Patient is able to rise from a seated position. Patient is able to bear weight. No signs of compensation/guarding in gait. Negative for Trendelenburg gait pattern.     Palpation: tender to palpation over quadriceps musculature diffusely.     ROM: normal seated flexion, extension, abduction and adduction of hip bilaterally.     VMO weakness bilaterally 4/5.     Special Testing:   Straight Leg Raise: negative (Lumbar spine)   ELSI/Figure 4: negative  (TFL)   Arturo: negative  (IT band)   Gait/Trendelenburg: see above   Leg length: see above    Neurovascular exam: distal pulses and sensation intact.     Bilateral Knee Examination:  Gait: normal gait with no signs of guarding or compensation. Patient is able to get up and go from a seated position, in order to ambulate.    Appearance of the RIGHT knee: Skin is clean, dry, and non-ecchymotic. Effusion none. Tenderness to palpation none.  Patellar tracking is normal. ROM: flexion 125, extension 0. Stable to varus, valgus, Lachman, A&P drawer ligamentous stressing. Herber negative. Extension strength 5/5. Neurovascular status, distal pulses and sensation intact.    Appearance of the LEFT knee: Skin is clean, dry, and non-ecchymotic. Effusion none. Tenderness to palpation VMO distally, quadriceps tendon.  Patellar tracking is normal. ROM: flexion 125, extension 0.  Stable to varus, valgus, Lachman, A&P drawer ligamentous stressing. Herber negative. Extension strength 5/5. Neurovascular status, distal pulses and sensation intact.    Bilateral  ANKLE PHYSICAL EXAMINATION:  Inspection: no signs of edema, bony deformity or skin abnormalities noted    Palpation: Tenderness to palpation over lateral calf and ankle musculature on left - moderate, minimal tenderness on right.     ROM: plantarflexion, dorsiflexion, inversion, eversion - full in all planes. Describes a pulling sensation with maximum dorsiflexion.     Negative amando bilaterally.     Stability testing:   Anterior drawer: negative    Syndesmotic Stress tests: Bump test/Squeeze tests are negative   Menard test: negative    Talar tilt: negative, no sign of calcaneofibular ligament strain   Inversion for Deltoid Ligament: 0 laxity to ligamentous stressing   Eversion for ATF Ligament: 0 laxity to ligamentous stressing     Neurovascular Exam:   Pulses: Dorsalis pedis and Posterior tibial pulse 2+   Capillary refill intact < 3 seconds   Sensation intact, patient able to wiggle/move all toes      Bilateral FOOT:  Inspection:  No swelling, redness or ecchymosis   Tender: no bony or soft tissue tenderness  Range of Motion: full flexion and extension of toes    Dermatological: no rashes noted of exposed skin  Psychological: normal affect, alert, oriented, and pleasant.     Labs:  None     Xray:  None

## 2022-09-07 NOTE — NURSING NOTE
Chief Complaint   Patient presents with     Musculoskeletal Problem     Patient presents to the clinic today for leg pain. Patient states she pulled her quad muscles bilaterally 8 days ago and is now experiencing knee and ankle pain.     Medication Reconciliation: aditi Boudreaux LPN

## 2022-09-08 ENCOUNTER — TELEPHONE (OUTPATIENT)
Dept: FAMILY MEDICINE | Facility: OTHER | Age: 32
End: 2022-09-08

## 2022-09-08 DIAGNOSIS — F90.2 ATTENTION DEFICIT HYPERACTIVITY DISORDER (ADHD), COMBINED TYPE: ICD-10-CM

## 2022-09-08 RX ORDER — METHYLPHENIDATE HYDROCHLORIDE 10 MG/1
10 CAPSULE, EXTENDED RELEASE ORAL DAILY
Qty: 30 CAPSULE | Refills: 0 | Status: SHIPPED | OUTPATIENT
Start: 2022-09-08 | End: 2022-10-07

## 2022-09-08 NOTE — TELEPHONE ENCOUNTER
TJP-patient missed appt on 09.08.22 for medication review and R/S for 10.07.22 she will be out of her medications 09.09.22    Please call and advise  Thank You    Ebonie Momin on 9/8/2022 at 8:58 AM

## 2022-09-08 NOTE — TELEPHONE ENCOUNTER
Patient states she will be out of her Ritalin LA 10mg 24 hour capsules prior to scheduled appointment on 10/07/22.  Please advise.    Melanie Terry LPN............9/8/2022 1:05 PM

## 2022-09-17 ENCOUNTER — HEALTH MAINTENANCE LETTER (OUTPATIENT)
Age: 32
End: 2022-09-17

## 2022-10-07 ENCOUNTER — VIRTUAL VISIT (OUTPATIENT)
Dept: FAMILY MEDICINE | Facility: OTHER | Age: 32
End: 2022-10-07
Attending: FAMILY MEDICINE
Payer: COMMERCIAL

## 2022-10-07 DIAGNOSIS — N97.9 FEMALE INFERTILITY: ICD-10-CM

## 2022-10-07 DIAGNOSIS — N92.1 MENORRHAGIA WITH IRREGULAR CYCLE: ICD-10-CM

## 2022-10-07 DIAGNOSIS — F90.2 ATTENTION DEFICIT HYPERACTIVITY DISORDER (ADHD), COMBINED TYPE: Primary | ICD-10-CM

## 2022-10-07 PROCEDURE — 99213 OFFICE O/P EST LOW 20 MIN: CPT | Mod: 95 | Performed by: FAMILY MEDICINE

## 2022-10-07 RX ORDER — METHYLPHENIDATE HYDROCHLORIDE 20 MG/1
20 CAPSULE, EXTENDED RELEASE ORAL DAILY
Qty: 30 CAPSULE | Refills: 0 | Status: SHIPPED | OUTPATIENT
Start: 2022-10-07 | End: 2022-11-06

## 2022-10-07 RX ORDER — METHYLPHENIDATE HYDROCHLORIDE 20 MG/1
20 CAPSULE, EXTENDED RELEASE ORAL DAILY
Qty: 30 CAPSULE | Refills: 0 | Status: SHIPPED | OUTPATIENT
Start: 2022-12-08 | End: 2023-01-07

## 2022-10-07 RX ORDER — METHYLPHENIDATE HYDROCHLORIDE 20 MG/1
20 CAPSULE, EXTENDED RELEASE ORAL DAILY
Qty: 30 CAPSULE | Refills: 0 | Status: SHIPPED | OUTPATIENT
Start: 2022-11-07 | End: 2022-12-07

## 2022-10-07 RX ORDER — BUPROPION HYDROCHLORIDE 150 MG/1
150 TABLET, EXTENDED RELEASE ORAL
COMMUNITY
End: 2022-12-23

## 2022-10-07 NOTE — NURSING NOTE
"Chief Complaint   Patient presents with     Recheck Medication       Initial LMP 08/31/2022  Estimated body mass index is 25.76 kg/m  as calculated from the following:    Height as of 9/7/22: 1.651 m (5' 5\").    Weight as of 9/7/22: 70.2 kg (154 lb 12.8 oz).  Medication Reconciliation: complete    FOOD SECURITY SCREENING QUESTIONS  Hunger Vital Signs:  Within the past 12 months we worried whether our food would run out before we got money to buy more. Never  Within the past 12 months the food we bought just didn't last and we didn't have money to get more. Never  Olga Brito LPN 10/7/2022 12:56 PM        "

## 2022-10-07 NOTE — PROGRESS NOTES
Nikky is a 31 year old who is being evaluated via a billable telephone visit.      What phone number would you like to be contacted at? [phone]  How would you like to obtain your AVS? Lisandro    Assessment & Plan     (F90.2) Attention deficit hyperactivity disorder (ADHD), combined type  (primary encounter diagnosis)  Comment: will trial a higher dose.  reviewed.  Follow up in 3 months.  Plan: methylphenidate (RITALIN LA) 20 MG 24 hr         capsule, methylphenidate (RITALIN LA) 20 MG 24         hr capsule, methylphenidate (RITALIN LA) 20 MG         24 hr capsule             (N92.1) Menorrhagia with irregular cycle  Comment: this could be thyroid disease too.  Given she has been actively trying to conceive and is now over 31, will arrange for a gyn consult.    Plan: Ob/Gyn Referral             (N97.9) Female infertility  Comment: see above. Has been nearly 1 year of trying.  Advised  also have a semen analysis.    Plan: Ob/Gyn Referral                            No follow-ups on file.    Chance Daly MD  [facility] AND [facility]    Subjective   Nikky is a 31 year old, presenting for the following health issues:  Recheck Medication      History of Present Illness       Reason for visit:  ADHD    She eats 0-1 servings of fruits and vegetables daily.She consumes 1 sweetened beverage(s) daily.She exercises with enough effort to increase her heart rate 20 to 29 minutes per day.  She exercises with enough effort to increase her heart rate 4 days per week. She is missing 1 dose(s) of medications per week.  She is not taking prescribed medications regularly due to remembering to take.     She has been trying to conceive with her  for nearly 1 year.  Menses are spacing out, every 37-40 days, lasting now for about 10 days total, light flow.  Some weight gain and acne and is wondering about possible PCOS.  Getting some menstrual cramps now, which also is new.  .   has no  "children.    Regarding her Ritalin, she feels it is helping but some days has trouble with motivation and focusing and wondering about a higher dose.  Has been on meds off and on since age 12 or so,. Cannot recall which meds she used then.  No side effects.  Her anxiety and depression are much improved.    NEIDA-7 SCORE 2/23/2022 3/13/2022 6/11/2022   Total Score 5 (mild anxiety) 5 (mild anxiety) 8 (mild anxiety)   Total Score 5 5 8       PHQ 2/23/2022 3/13/2022 6/11/2022   PHQ-9 Total Score 10 5 6   Q9: Thoughts of better off dead/self-harm past 2 weeks Not at all Not at all Not at all               Review of Systems         Objective    Vitals - Patient Reported  Weight (Patient Reported): 70.3 kg (155 lb)  Height (Patient Reported): 165.1 cm (5' 5\")  BMI (Based on Pt Reported Ht/Wt): 25.79  Pain Score: No Pain (0)        Physical Exam   healthy, alert and no distress  PSYCH: Alert and oriented times 3; coherent speech, normal   rate and volume, able to articulate logical thoughts, able   to abstract reason, no tangential thoughts, no hallucinations   or delusions  Her affect is normal  RESP: No cough, no audible wheezing, able to talk in full sentences  Remainder of exam unable to be completed due to telephone visits                Phone call duration: 12 minutes    "

## 2022-10-28 DIAGNOSIS — N92.0 MENORRHAGIA: Primary | ICD-10-CM

## 2022-10-28 NOTE — PROGRESS NOTES
Patient is scheduled to be seen on 11/8/22 by Dr. Nichelle Liu  for menorrhagia. Patient has not had any imaging at this time. Provider prefers an ultrasound to assess for abnormalities of the uterus.     Order placed per provider preference.      Jeimy Mosley RN ............. 10/28/2022 4:24 PM

## 2022-11-03 ENCOUNTER — HOSPITAL ENCOUNTER (OUTPATIENT)
Dept: ULTRASOUND IMAGING | Facility: OTHER | Age: 32
Discharge: HOME | End: 2022-11-03
Attending: STUDENT IN AN ORGANIZED HEALTH CARE EDUCATION/TRAINING PROGRAM | Admitting: STUDENT IN AN ORGANIZED HEALTH CARE EDUCATION/TRAINING PROGRAM
Payer: COMMERCIAL

## 2022-11-03 DIAGNOSIS — N92.0 MENORRHAGIA: ICD-10-CM

## 2022-11-03 PROCEDURE — 76856 US EXAM PELVIC COMPLETE: CPT

## 2022-11-10 ENCOUNTER — OFFICE VISIT (OUTPATIENT)
Dept: OBGYN | Facility: OTHER | Age: 32
End: 2022-11-10
Attending: FAMILY MEDICINE
Payer: COMMERCIAL

## 2022-11-10 VITALS
SYSTOLIC BLOOD PRESSURE: 110 MMHG | HEIGHT: 65 IN | DIASTOLIC BLOOD PRESSURE: 70 MMHG | BODY MASS INDEX: 25.72 KG/M2 | HEART RATE: 94 BPM | WEIGHT: 154.4 LBS

## 2022-11-10 DIAGNOSIS — N92.1 MENORRHAGIA WITH IRREGULAR CYCLE: ICD-10-CM

## 2022-11-10 DIAGNOSIS — N97.9 FEMALE INFERTILITY: ICD-10-CM

## 2022-11-10 PROCEDURE — 99215 OFFICE O/P EST HI 40 MIN: CPT | Performed by: STUDENT IN AN ORGANIZED HEALTH CARE EDUCATION/TRAINING PROGRAM

## 2022-11-10 ASSESSMENT — PAIN SCALES - GENERAL: PAINLEVEL: NO PAIN (0)

## 2022-11-10 NOTE — PROGRESS NOTES
Gynecology Office Visit    Chief Complaint: Infertility    HPI:    Nikky Hurt is a 32 year old , here for discussion of infertility workup. She and her  have been trying for approximately 10 months. She stopped her birth control in 2022. Her periods are irregular since stopping her birth control. They were regular when on the pill. She stopped this in 2022. Her cycles tend to be around 30-35 days. She has 3-4 of bleeding. The bleeding is heavy for about 1 day.     She has been using OPKs for a few months and had a positive result x1. This occurred on cycle day # 9 and only  Lasted + on one day.     She has a medical history of juvenille rheumatoid arthritis for which she follows with a rheumatology team closely. She is currently taking Enbrel with good control. She notes her rheumatology team has mentioned that she can get pregnant.    OBHx  OB History    Para Term  AB Living   0 0 0 0 0 0   SAB IAB Ectopic Multiple Live Births   0 0 0 0 0       GYN history:   No history of STIs  Last pap smear : 2020 NIL, No history of abnormal pap smears  Has previously been vaccinated for HPV.  Menses occur at irregular intervals. She notes that they range from 28-35 day cycles.     Past medical history:  Past Medical History:   Diagnosis Date     Acne, mild 2010     HEATH positive 2017     Attention deficit hyperactivity disorder (ADHD), combined type 2017    Overview:  Diagnosed in elementary age. Used some medications but cannot recall what at that time.      Chronic polyarticular juvenile rheumatoid arthritis (H) 2014    Overview:  IMO Update 10/11 Overview:  Diagnosed at age 3 years. Followed at [facility] as a child.  Sees Dr Farmer twice a year     IBS (irritable bowel syndrome) 6/10/2015     Juvenile rheumatoid arthritis (H) 2014    Diagnosed at age 3 years, followed by [facility] as a child.  Follows Dr Farmer twice a year.     Myopia 3/23/2005      Tendinitis of extensor tendon of right hand 6/12/2018     Tendinitis of right elbow 2/1/2017     Varicella without complication     As a child     Currently uses Enbrel for juvenille RA    Specifically denies VTE, DM, HTN or bleeding disorders    Past Surgical History:  Past Surgical History:   Procedure Laterality Date     ARTHROSCOPY KNEE Left 10/2019     EXTRACTION(S) DENTAL         Medications:  Current Outpatient Medications   Medication     buPROPion (WELLBUTRIN SR) 150 MG 12 hr tablet     buPROPion (WELLBUTRIN XL) 150 MG 24 hr tablet     Etanercept (ENBREL SURECLICK) 50 MG/ML autoinjector     methylphenidate (RITALIN LA) 20 MG 24 hr capsule     [START ON 12/8/2022] methylphenidate (RITALIN LA) 20 MG 24 hr capsule     metroNIDAZOLE (METROGEL) 0.75 % external gel     montelukast (SINGULAIR) 10 MG tablet     mupirocin (BACTROBAN) 2 % external ointment     sulindac (CLINORIL) 200 MG tablet     No current facility-administered medications for this visit.       Allergies:     No Known Allergies    Social History:  Social History     Tobacco Use     Smoking status: Never     Smokeless tobacco: Former     Types: Chew     Tobacco comments:     Quit smoking: Rarely, with driving, roomate smokes   Vaping Use     Vaping Use: Never used   Substance Use Topics     Alcohol use: Yes     Alcohol/week: 0.0 standard drinks     Comment: Alcoholic Drinks/day: occasional     Drug use: No   Works at [employer]    Family History:  Family History   Problem Relation Age of Onset     Family History Negative Mother         Good Health     Family History Negative Father         Good Health     Breast Cancer Maternal Grandmother         Cancer-breast     Ankylosing Spondylitis Paternal Grandmother         Ankylosing spondylitis     Glaucoma Paternal Grandfather         Glaucoma     Other - See Comments Maternal Aunt         Kidney stones     Other - See Comments Maternal Uncle         Kidney stones     Specifically denies ovarian,  "colon, pancreatic cancers  Specifically denies VTE, known familial thrombophilias and coagulopathies    ROS:   Respiratory: No shortness of breath, dyspnea on exertion, cough, or hemoptysis  Cardiovascular: negative for palpitations, chest pain, lower extremity edema and syncope or near-syncope  Gastrointestinal: negative for, nausea, vomiting and hematemesis  Genitourinary: negative for, dysuria, frequency and urgency  Musculoskeletal: negative for, back pain and muscular weakness  Psychiatric: negative for, anxiety, depression and hallucinations  Hematologic/Lymphatic/Immunologic: negative for, anemia, chills and fever      Physical Exam  /70   Pulse 94   Ht 1.651 m (5' 5\")   Wt 70 kg (154 lb 6.4 oz)   LMP 2022   BMI 25.69 kg/m    Gen: Well-appearing, no acute distressed, well-groomed, alert  HEENT: Normocephalic, atraumatic  Cardiovascular: Regular rate, No peripheral edema, normal peripheral circulation  Pulm: Symmetric chest rise, non-labored respirations  Abd: Soft, non-tender, non-distended  Ext: No LE edema, extremities warm and well perfused  Pelvic:  Deferred at this time    Pelvic US:  Reviewed normal sized uterus with normal contour. The ovaries have demonstrated multiple small follicles, possibly reflecting a PCOS picture. No myomas or free fluid.      Assessment/Plan  Nikky Hurt is a 32 year old  female here for infertility discussion.     #  Infertility: unknown etiology at this time- suspect anovulation.  - Labs to be ordered when she is ready:              Serum HCG              FSH              LH              E2              TSH              PRL   Antiphospholipid AB workup (anti-cardiolipin, B2-glycoprotein, lupus anticoagulant)     - Male factor considerations              Recommended he establish care with a PCP and get a semen analysis when they are ready     - Anatomical/tract considerations: No prior history of GC/Chlam              Will plan on HSG and SIS when " ready     - Counseled on how to use ovulation predictor kits. This month I anticipate she will ovulate around Nov 14-15. She will begin testing on 12th and go until the 16th.      At this time she understands that she does not meet criteria for infertility until 12 months of specific trialing. She and her  would like to meet with financial advocates for next steps and will call with what testing they would like to perform and when. In discussion of their goals, they are most interested in workup and then ovulation induction with timed intercourse.      # PMH  -- Discussed that Ritalin is not recommended in pregnancy and at this time if she would like to try to start weaning off, that would be helpful for when she does get pregnant. She will continue on Wellbutrin.     Total amount of time spent during today's encounter including chart prep, face to face, documentation was 67 minutes.     JUDITH YU MD on 11/10/2022 at 3:49 PM

## 2022-11-10 NOTE — NURSING NOTE
Pt presents to clinic today for consult on menorrhagia with irregular cycle.      Medication Reconciliation: complete  Liliya Tony LPN

## 2022-12-16 ASSESSMENT — ANXIETY QUESTIONNAIRES
IF YOU CHECKED OFF ANY PROBLEMS ON THIS QUESTIONNAIRE, HOW DIFFICULT HAVE THESE PROBLEMS MADE IT FOR YOU TO DO YOUR WORK, TAKE CARE OF THINGS AT HOME, OR GET ALONG WITH OTHER PEOPLE: SOMEWHAT DIFFICULT
8. IF YOU CHECKED OFF ANY PROBLEMS, HOW DIFFICULT HAVE THESE MADE IT FOR YOU TO DO YOUR WORK, TAKE CARE OF THINGS AT HOME, OR GET ALONG WITH OTHER PEOPLE?: SOMEWHAT DIFFICULT
GAD7 TOTAL SCORE: 7
4. TROUBLE RELAXING: SEVERAL DAYS
GAD7 TOTAL SCORE: 7
1. FEELING NERVOUS, ANXIOUS, OR ON EDGE: SEVERAL DAYS
2. NOT BEING ABLE TO STOP OR CONTROL WORRYING: SEVERAL DAYS
GAD7 TOTAL SCORE: 7
3. WORRYING TOO MUCH ABOUT DIFFERENT THINGS: MORE THAN HALF THE DAYS
7. FEELING AFRAID AS IF SOMETHING AWFUL MIGHT HAPPEN: NOT AT ALL
7. FEELING AFRAID AS IF SOMETHING AWFUL MIGHT HAPPEN: NOT AT ALL
6. BECOMING EASILY ANNOYED OR IRRITABLE: SEVERAL DAYS
5. BEING SO RESTLESS THAT IT IS HARD TO SIT STILL: SEVERAL DAYS

## 2022-12-23 ENCOUNTER — OFFICE VISIT (OUTPATIENT)
Dept: FAMILY MEDICINE | Facility: OTHER | Age: 32
End: 2022-12-23
Attending: FAMILY MEDICINE
Payer: COMMERCIAL

## 2022-12-23 VITALS
BODY MASS INDEX: 21.9 KG/M2 | OXYGEN SATURATION: 98 % | WEIGHT: 153 LBS | HEIGHT: 70 IN | TEMPERATURE: 97.5 F | DIASTOLIC BLOOD PRESSURE: 78 MMHG | SYSTOLIC BLOOD PRESSURE: 118 MMHG | RESPIRATION RATE: 18 BRPM | HEART RATE: 100 BPM

## 2022-12-23 DIAGNOSIS — F90.2 ATTENTION DEFICIT HYPERACTIVITY DISORDER (ADHD), COMBINED TYPE: Primary | ICD-10-CM

## 2022-12-23 PROCEDURE — 99213 OFFICE O/P EST LOW 20 MIN: CPT | Performed by: FAMILY MEDICINE

## 2022-12-23 RX ORDER — METHYLPHENIDATE HYDROCHLORIDE 20 MG/1
20 CAPSULE, EXTENDED RELEASE ORAL DAILY
Qty: 30 CAPSULE | Refills: 0 | Status: SHIPPED | OUTPATIENT
Start: 2023-01-23 | End: 2023-02-22

## 2022-12-23 RX ORDER — METHYLPHENIDATE HYDROCHLORIDE 20 MG/1
20 CAPSULE, EXTENDED RELEASE ORAL DAILY
Qty: 30 CAPSULE | Refills: 0 | Status: SHIPPED | OUTPATIENT
Start: 2023-02-23 | End: 2023-01-13

## 2022-12-23 RX ORDER — METHYLPHENIDATE HYDROCHLORIDE 20 MG/1
20 CAPSULE, EXTENDED RELEASE ORAL DAILY
Qty: 30 CAPSULE | Refills: 0 | Status: SHIPPED | OUTPATIENT
Start: 2022-12-23 | End: 2023-01-22

## 2022-12-23 ASSESSMENT — ANXIETY QUESTIONNAIRES
5. BEING SO RESTLESS THAT IT IS HARD TO SIT STILL: SEVERAL DAYS
GAD7 TOTAL SCORE: 5
IF YOU CHECKED OFF ANY PROBLEMS ON THIS QUESTIONNAIRE, HOW DIFFICULT HAVE THESE PROBLEMS MADE IT FOR YOU TO DO YOUR WORK, TAKE CARE OF THINGS AT HOME, OR GET ALONG WITH OTHER PEOPLE: SOMEWHAT DIFFICULT
7. FEELING AFRAID AS IF SOMETHING AWFUL MIGHT HAPPEN: NOT AT ALL
1. FEELING NERVOUS, ANXIOUS, OR ON EDGE: SEVERAL DAYS
6. BECOMING EASILY ANNOYED OR IRRITABLE: SEVERAL DAYS
3. WORRYING TOO MUCH ABOUT DIFFERENT THINGS: SEVERAL DAYS
2. NOT BEING ABLE TO STOP OR CONTROL WORRYING: NOT AT ALL
GAD7 TOTAL SCORE: 7

## 2022-12-23 ASSESSMENT — PATIENT HEALTH QUESTIONNAIRE - PHQ9: 5. POOR APPETITE OR OVEREATING: SEVERAL DAYS

## 2022-12-23 ASSESSMENT — PAIN SCALES - GENERAL: PAINLEVEL: NO PAIN (0)

## 2022-12-23 NOTE — PROGRESS NOTES
Assessment & Plan     (F90.2) Attention deficit hyperactivity disorder (ADHD), combined type  (primary encounter diagnosis)  Comment: stable.  reviewed and without concerns.  Low risk for abuse or diversion.    Plan: methylphenidate (RITALIN LA) 20 MG 24 hr         capsule, methylphenidate (RITALIN LA) 20 MG 24         hr capsule, methylphenidate (RITALIN LA) 20 MG         24 hr capsule                            Return in about 3 months (around 3/23/2023).    Chance Daly MD  [facility] [place]    Temo Sher is a 32 year old, presenting for the following health issues:  Recheck Medication (Follow up on medication)      History of Present Illness       Mental Health Follow-up:  Patient presents to follow-up on Anxiety.    Patient's anxiety since last visit has been:  No change  The patient is not having other symptoms associated with anxiety.  Any significant life events: job concerns  Patient is feeling anxious or having panic attacks.  Patient has no concerns about alcohol or drug use.    Reason for visit:  Medication    She eats 0-1 servings of fruits and vegetables daily.She consumes 1 sweetened beverage(s) daily.She exercises with enough effort to increase her heart rate 30 to 60 minutes per day.  She exercises with enough effort to increase her heart rate 3 or less days per week.   She is taking medications regularly.  Today's NEIDA-7 Score: 7     Has some good days and bad days, overstimulated when there are too many people talking at the same time. has tried anxiety meds in the past, did not like side effects.  Has not tried therapy, no time. Looked into online, but still does not fit with her schedule.    Due for refills on her ritalin.  This is working well for her overall.  Met with OB regarding fertility.  Intends to do fertility testing in 2 months or so and was told will have to stop the ritalin. Would not at this point consider IVF.      Current Outpatient Medications  "  Medication     buPROPion (WELLBUTRIN SR) 150 MG 12 hr tablet     buPROPion (WELLBUTRIN XL) 150 MG 24 hr tablet     Etanercept (ENBREL SURECLICK) 50 MG/ML autoinjector     methylphenidate (RITALIN LA) 20 MG 24 hr capsule     metroNIDAZOLE (METROGEL) 0.75 % external gel     montelukast (SINGULAIR) 10 MG tablet     mupirocin (BACTROBAN) 2 % external ointment     sulindac (CLINORIL) 200 MG tablet     No current facility-administered medications for this visit.             Review of Systems         Objective    /78 (BP Location: Right arm, Patient Position: Sitting, Cuff Size: Adult Large)   Pulse 100   Temp 97.5  F (36.4  C) (Temporal)   Resp 18   Ht 1.79 m (5' 10.47\")   Wt 69.4 kg (153 lb)   LMP 12/08/2022 (Approximate)   SpO2 98%   BMI 21.66 kg/m    Body mass index is 21.66 kg/m .  Physical Exam  Constitutional:       Appearance: Normal appearance.   Neurological:      General: No focal deficit present.      Mental Status: She is alert and oriented to person, place, and time.   Psychiatric:         Mood and Affect: Mood normal.         Behavior: Behavior normal.         Thought Content: Thought content normal.                            "

## 2022-12-23 NOTE — NURSING NOTE
"Chief Complaint   Patient presents with     Recheck Medication     Follow up on medication       Initial /78 (BP Location: Right arm, Patient Position: Sitting, Cuff Size: Adult Large)   Pulse 100   Temp 97.5  F (36.4  C) (Temporal)   Resp 18   Ht 1.79 m (5' 10.47\")   Wt 69.4 kg (153 lb)   LMP 12/08/2022 (Approximate)   SpO2 98%   BMI 21.66 kg/m   Estimated body mass index is 21.66 kg/m  as calculated from the following:    Height as of this encounter: 1.79 m (5' 10.47\").    Weight as of this encounter: 69.4 kg (153 lb).      Medication Reconciliation: complete    FOOD SECURITY SCREENING QUESTIONS:      The next two questions are to help us understand your food security.  If you are feeling you need any assistance in this area, we have resources available to support you today.    Hunger Vital Signs:  Within the past 12 months we worried whether our food would run out before we got money to buy more. Never  Within the past 12 months the food we bought just didn't last and we didn't have money to get more. Never    Hien Mark LPN....................  12/23/2022   2:55 PM    "

## 2023-01-13 ENCOUNTER — MYC MEDICAL ADVICE (OUTPATIENT)
Dept: FAMILY MEDICINE | Facility: OTHER | Age: 33
End: 2023-01-13

## 2023-01-13 ENCOUNTER — TELEPHONE (OUTPATIENT)
Dept: FAMILY MEDICINE | Facility: OTHER | Age: 33
End: 2023-01-13

## 2023-01-13 DIAGNOSIS — F90.2 ATTENTION DEFICIT HYPERACTIVITY DISORDER (ADHD), COMBINED TYPE: ICD-10-CM

## 2023-01-13 RX ORDER — METHYLPHENIDATE HYDROCHLORIDE 20 MG/1
20 CAPSULE, EXTENDED RELEASE ORAL DAILY
Qty: 30 CAPSULE | Refills: 0 | Status: SHIPPED | OUTPATIENT
Start: 2023-01-13 | End: 2023-02-07

## 2023-01-13 RX ORDER — METHYLPHENIDATE HYDROCHLORIDE 20 MG/1
20 CAPSULE, EXTENDED RELEASE ORAL DAILY
Qty: 30 CAPSULE | Refills: 0 | Status: SHIPPED | OUTPATIENT
Start: 2023-02-23 | End: 2023-01-13

## 2023-01-13 NOTE — TELEPHONE ENCOUNTER
URGENT!!!    Patient called stating [facility] doesn't have this in stock. New prescription was sent to [facility], however it has earliest fill date of 2/20/23. Pt needs new prescription, so she can get this today.    Per chart review:  Original prescriptions were sent to [facility] on 12/23, with earliest start dates of 12/23/22, 1/23/23 and 2/20/23. The 2/20/23 prescription was discontinued and resent to [facility].    Melanie Gasca RN .............. 1/13/2023  4:35 PM

## 2023-02-07 ENCOUNTER — MYC REFILL (OUTPATIENT)
Dept: FAMILY MEDICINE | Facility: OTHER | Age: 33
End: 2023-02-07
Payer: COMMERCIAL

## 2023-02-07 DIAGNOSIS — F90.2 ATTENTION DEFICIT HYPERACTIVITY DISORDER (ADHD), COMBINED TYPE: ICD-10-CM

## 2023-02-07 RX ORDER — METHYLPHENIDATE HYDROCHLORIDE 20 MG/1
20 CAPSULE, EXTENDED RELEASE ORAL DAILY
Qty: 30 CAPSULE | Refills: 0 | Status: SHIPPED | OUTPATIENT
Start: 2023-02-07 | End: 2023-05-31

## 2023-02-22 ENCOUNTER — MYC MEDICAL ADVICE (OUTPATIENT)
Dept: OBGYN | Facility: OTHER | Age: 33
End: 2023-02-22
Payer: COMMERCIAL

## 2023-02-22 DIAGNOSIS — N97.9 FEMALE INFERTILITY: ICD-10-CM

## 2023-02-22 DIAGNOSIS — N92.1 MENORRHAGIA WITH IRREGULAR CYCLE: Primary | ICD-10-CM

## 2023-02-28 ASSESSMENT — ANXIETY QUESTIONNAIRES
GAD7 TOTAL SCORE: 6
8. IF YOU CHECKED OFF ANY PROBLEMS, HOW DIFFICULT HAVE THESE MADE IT FOR YOU TO DO YOUR WORK, TAKE CARE OF THINGS AT HOME, OR GET ALONG WITH OTHER PEOPLE?: SOMEWHAT DIFFICULT
7. FEELING AFRAID AS IF SOMETHING AWFUL MIGHT HAPPEN: NOT AT ALL
6. BECOMING EASILY ANNOYED OR IRRITABLE: MORE THAN HALF THE DAYS
GAD7 TOTAL SCORE: 6
1. FEELING NERVOUS, ANXIOUS, OR ON EDGE: SEVERAL DAYS
GAD7 TOTAL SCORE: 6
7. FEELING AFRAID AS IF SOMETHING AWFUL MIGHT HAPPEN: NOT AT ALL
2. NOT BEING ABLE TO STOP OR CONTROL WORRYING: NOT AT ALL
4. TROUBLE RELAXING: SEVERAL DAYS
IF YOU CHECKED OFF ANY PROBLEMS ON THIS QUESTIONNAIRE, HOW DIFFICULT HAVE THESE PROBLEMS MADE IT FOR YOU TO DO YOUR WORK, TAKE CARE OF THINGS AT HOME, OR GET ALONG WITH OTHER PEOPLE: SOMEWHAT DIFFICULT
5. BEING SO RESTLESS THAT IT IS HARD TO SIT STILL: SEVERAL DAYS
3. WORRYING TOO MUCH ABOUT DIFFERENT THINGS: SEVERAL DAYS

## 2023-02-28 ASSESSMENT — PATIENT HEALTH QUESTIONNAIRE - PHQ9
10. IF YOU CHECKED OFF ANY PROBLEMS, HOW DIFFICULT HAVE THESE PROBLEMS MADE IT FOR YOU TO DO YOUR WORK, TAKE CARE OF THINGS AT HOME, OR GET ALONG WITH OTHER PEOPLE: SOMEWHAT DIFFICULT
SUM OF ALL RESPONSES TO PHQ QUESTIONS 1-9: 8
SUM OF ALL RESPONSES TO PHQ QUESTIONS 1-9: 8

## 2023-03-01 ENCOUNTER — OFFICE VISIT (OUTPATIENT)
Dept: FAMILY MEDICINE | Facility: OTHER | Age: 33
End: 2023-03-01
Attending: FAMILY MEDICINE
Payer: COMMERCIAL

## 2023-03-01 VITALS
DIASTOLIC BLOOD PRESSURE: 72 MMHG | SYSTOLIC BLOOD PRESSURE: 112 MMHG | RESPIRATION RATE: 16 BRPM | BODY MASS INDEX: 21.01 KG/M2 | TEMPERATURE: 97.7 F | OXYGEN SATURATION: 100 % | HEART RATE: 103 BPM | WEIGHT: 148.4 LBS

## 2023-03-01 DIAGNOSIS — Z11.59 NEED FOR HEPATITIS C SCREENING TEST: ICD-10-CM

## 2023-03-01 DIAGNOSIS — F90.2 ATTENTION DEFICIT HYPERACTIVITY DISORDER (ADHD), COMBINED TYPE: Primary | ICD-10-CM

## 2023-03-01 DIAGNOSIS — M67.449 GANGLION CYST OF FINGER: ICD-10-CM

## 2023-03-01 DIAGNOSIS — F32.0 MILD MAJOR DEPRESSION (H): ICD-10-CM

## 2023-03-01 DIAGNOSIS — Z12.4 CERVICAL CANCER SCREENING: ICD-10-CM

## 2023-03-01 DIAGNOSIS — Z11.4 SCREENING FOR HIV (HUMAN IMMUNODEFICIENCY VIRUS): ICD-10-CM

## 2023-03-01 PROCEDURE — 87624 HPV HI-RISK TYP POOLED RSLT: CPT | Mod: ZL | Performed by: FAMILY MEDICINE

## 2023-03-01 PROCEDURE — 90677 PCV20 VACCINE IM: CPT | Performed by: FAMILY MEDICINE

## 2023-03-01 PROCEDURE — 99214 OFFICE O/P EST MOD 30 MIN: CPT | Mod: 25 | Performed by: FAMILY MEDICINE

## 2023-03-01 PROCEDURE — G0123 SCREEN CERV/VAG THIN LAYER: HCPCS | Performed by: FAMILY MEDICINE

## 2023-03-01 PROCEDURE — 90471 IMMUNIZATION ADMIN: CPT | Performed by: FAMILY MEDICINE

## 2023-03-01 RX ORDER — METHYLPHENIDATE HYDROCHLORIDE 20 MG/1
20 CAPSULE, EXTENDED RELEASE ORAL DAILY
Qty: 30 CAPSULE | Refills: 0 | Status: SHIPPED | OUTPATIENT
Start: 2023-03-01 | End: 2023-03-31

## 2023-03-01 RX ORDER — BUPROPION HYDROCHLORIDE 150 MG/1
150 TABLET ORAL EVERY MORNING
Qty: 90 TABLET | Refills: 4 | Status: SHIPPED | OUTPATIENT
Start: 2023-03-01 | End: 2023-05-22

## 2023-03-01 RX ORDER — METHYLPHENIDATE HYDROCHLORIDE 20 MG/1
20 CAPSULE, EXTENDED RELEASE ORAL DAILY
Qty: 30 CAPSULE | Refills: 0 | Status: SHIPPED | OUTPATIENT
Start: 2023-05-02 | End: 2023-05-31

## 2023-03-01 RX ORDER — METHYLPHENIDATE HYDROCHLORIDE 20 MG/1
20 CAPSULE, EXTENDED RELEASE ORAL DAILY
Qty: 30 CAPSULE | Refills: 0 | Status: SHIPPED | OUTPATIENT
Start: 2023-04-01 | End: 2023-05-01

## 2023-03-01 ASSESSMENT — PATIENT HEALTH QUESTIONNAIRE - PHQ9
SUM OF ALL RESPONSES TO PHQ QUESTIONS 1-9: 8
10. IF YOU CHECKED OFF ANY PROBLEMS, HOW DIFFICULT HAVE THESE PROBLEMS MADE IT FOR YOU TO DO YOUR WORK, TAKE CARE OF THINGS AT HOME, OR GET ALONG WITH OTHER PEOPLE: SOMEWHAT DIFFICULT

## 2023-03-01 ASSESSMENT — PAIN SCALES - GENERAL: PAINLEVEL: MODERATE PAIN (4)

## 2023-03-01 ASSESSMENT — ANXIETY QUESTIONNAIRES: GAD7 TOTAL SCORE: 6

## 2023-03-01 NOTE — PROGRESS NOTES
Assessment & Plan     (F90.2) Attention deficit hyperactivity disorder (ADHD), combined type  (primary encounter diagnosis)  Comment: stable.  reviewed. Refilled without changes for 3 months  Plan: methylphenidate (RITALIN LA) 20 MG 24 hr         capsule, methylphenidate (RITALIN LA) 20 MG 24         hr capsule, methylphenidate (RITALIN LA) 20 MG         24 hr capsule             (Z11.4) Screening for HIV (human immunodeficiency virus)  Comment:    Plan:      (Z11.59) Need for hepatitis C screening test  Comment:    Plan:      (Z12.4) Cervical cancer screening  Comment:    Plan: Pap Screen with HPV - recommended age 30 - 65         years             (F32.0) Mild major depression (H)  Comment: worsened slightly. Pt wants to go back on zoloft.   Plan: buPROPion (WELLBUTRIN XL) 150 MG 24 hr tablet,         sertraline (ZOLOFT) 50 MG tablet             (M67.449) Ganglion cyst of finger  Comment: has had a prior ultrasound at outside facility. So far has not wanted interventions, which is fair given many of these will resolve. Will have her start OCCUPATIONAL THERAPY, and possibly get a supportive splint  Plan: Occupational Therapy Referral                            Return in about 3 months (around 6/1/2023).    Chance Daly MD  [facility]   Nikky is a 32 year old, presenting for the following health issues:  Recheck Medication      History of Present Illness       Mental Health Follow-up:  Patient presents to follow-up on Anxiety.    Patient's anxiety since last visit has been:  Medium  The patient is having other symptoms associated with anxiety.  Any significant life events: No  Patient is feeling anxious or having panic attacks.  Patient has no concerns about alcohol or drug use.    Reason for visit:  Pap and med visit    She eats 0-1 servings of fruits and vegetables daily.She consumes 1 sweetened beverage(s) daily.She exercises with enough effort to increase her heart rate  9 or less minutes per day.  She exercises with enough effort to increase her heart rate 3 or less days per week.   She is taking medications regularly.    Today's PHQ-9         PHQ-9 Total Score: 8    PHQ-9 Q9 Thoughts of better off dead/self-harm past 2 weeks :   Not at all    How difficult have these problems made it for you to do your work, take care of things at home, or get along with other people: Somewhat difficult  Today's NEIDA-7 Score: 6     Feels anxiety is still not great and wants to go back on zoloft. Has been off it for a year. On wellbutrin was having some insomnia and night sweats.    Also due for a refill on ritalin. Feels this is working well for her.     Has had pain in the right 1st CMC joint for years, more lately. Prior ultrasound at [facility] showed a cyst. Wants to do some therapy on it now.            Review of Systems         Objective    /72   Pulse 103   Temp 97.7  F (36.5  C) (Tympanic)   Resp 16   Wt 67.3 kg (148 lb 6.4 oz)   LMP 02/01/2023 (Approximate)   SpO2 100%   BMI 21.01 kg/m    Body mass index is 21.01 kg/m .  Physical Exam  Constitutional:       Appearance: Normal appearance.   Genitourinary:     General: Normal vulva.      Vagina: No vaginal discharge.      Comments: Exam with nurse present. Pap smear obtained. Normal bimanual, no cmt, no masses.   Musculoskeletal:      Comments: Right 1st CMC and MCP without redness or pain on palpation. Range of motion appears full.   Neurological:      General: No focal deficit present.      Mental Status: She is alert and oriented to person, place, and time.   Psychiatric:         Mood and Affect: Mood normal.         Behavior: Behavior normal.         Thought Content: Thought content normal.

## 2023-03-01 NOTE — NURSING NOTE
"Chief Complaint   Patient presents with     Recheck Medication       Initial /72   Pulse 103   Temp 97.7  F (36.5  C) (Tympanic)   Resp 16   Wt 67.3 kg (148 lb 6.4 oz)   LMP 02/01/2023 (Approximate)   SpO2 100%   BMI 21.01 kg/m   Estimated body mass index is 21.01 kg/m  as calculated from the following:    Height as of 12/23/22: 1.79 m (5' 10.47\").    Weight as of this encounter: 67.3 kg (148 lb 6.4 oz).  Medication Reconciliation: complete    FOOD SECURITY SCREENING QUESTIONS  Hunger Vital Signs:  Within the past 12 months we worried whether our food would run out before we got money to buy more. Never  Within the past 12 months the food we bought just didn't last and we didn't have money to get more. Never  Olga Brito LPN 3/1/2023 11:23 AM      "

## 2023-03-03 LAB
BKR LAB AP GYN ADEQUACY: NORMAL
BKR LAB AP GYN INTERPRETATION: NORMAL
BKR LAB AP HPV REFLEX: NORMAL
BKR LAB AP LMP: NORMAL
BKR LAB AP PREVIOUS ABNORMAL: NORMAL
PATH REPORT.COMMENTS IMP SPEC: NORMAL
PATH REPORT.COMMENTS IMP SPEC: NORMAL
PATH REPORT.RELEVANT HX SPEC: NORMAL

## 2023-03-07 ENCOUNTER — MYC MEDICAL ADVICE (OUTPATIENT)
Dept: OBGYN | Facility: OTHER | Age: 33
End: 2023-03-07
Payer: COMMERCIAL

## 2023-03-08 LAB
HUMAN PAPILLOMA VIRUS 16 DNA: NEGATIVE
HUMAN PAPILLOMA VIRUS 18 DNA: NEGATIVE
HUMAN PAPILLOMA VIRUS FINAL DIAGNOSIS: NORMAL
HUMAN PAPILLOMA VIRUS OTHER HR: NEGATIVE

## 2023-03-09 NOTE — TELEPHONE ENCOUNTER
Spoke with patient. States when she went to bed last night, she did not have any bleeding. States she woke up this morning with bright red blood. Counted today as cycle day 1. Patient was instructed to come into ER on cycle day 3 (Saturday) and let them know she is there for a lab only appointment- orders are in patient's chart (see past documentation for labs orders, etc)    Corinne R Thayer, RN on 3/9/2023 at 10:15 AM

## 2023-04-06 ENCOUNTER — LAB (OUTPATIENT)
Dept: LAB | Facility: OTHER | Age: 33
End: 2023-04-06
Attending: FAMILY MEDICINE
Payer: COMMERCIAL

## 2023-04-06 DIAGNOSIS — N92.1 MENORRHAGIA WITH IRREGULAR CYCLE: ICD-10-CM

## 2023-04-06 DIAGNOSIS — N97.9 FEMALE INFERTILITY: ICD-10-CM

## 2023-04-06 LAB
ESTRADIOL SERPL-MCNC: 84 PG/ML
FSH SERPL IRP2-ACNC: 3.9 MIU/ML
HCG INTACT+B SERPL-ACNC: <1 MIU/ML
LH SERPL-ACNC: 8.1 MIU/ML
PROLACTIN SERPL 3RD IS-MCNC: 16 NG/ML (ref 5–23)
TSH SERPL DL<=0.005 MIU/L-ACNC: 2.77 UIU/ML (ref 0.3–4.2)

## 2023-04-06 PROCEDURE — 85730 THROMBOPLASTIN TIME PARTIAL: CPT | Mod: ZL

## 2023-04-06 PROCEDURE — 85390 FIBRINOLYSINS SCREEN I&R: CPT | Mod: 26 | Performed by: PATHOLOGY

## 2023-04-06 PROCEDURE — 36415 COLL VENOUS BLD VENIPUNCTURE: CPT | Mod: ZL

## 2023-04-06 PROCEDURE — 86146 BETA-2 GLYCOPROTEIN ANTIBODY: CPT | Mod: ZL

## 2023-04-06 PROCEDURE — 86147 CARDIOLIPIN ANTIBODY EA IG: CPT | Mod: ZL

## 2023-04-06 PROCEDURE — 84702 CHORIONIC GONADOTROPIN TEST: CPT | Mod: ZL

## 2023-04-06 PROCEDURE — 83001 ASSAY OF GONADOTROPIN (FSH): CPT | Mod: ZL

## 2023-04-06 PROCEDURE — 84146 ASSAY OF PROLACTIN: CPT | Mod: ZL

## 2023-04-06 PROCEDURE — 83002 ASSAY OF GONADOTROPIN (LH): CPT | Mod: ZL

## 2023-04-06 PROCEDURE — 84443 ASSAY THYROID STIM HORMONE: CPT | Mod: ZL

## 2023-04-06 PROCEDURE — 82670 ASSAY OF TOTAL ESTRADIOL: CPT | Mod: ZL

## 2023-04-07 ENCOUNTER — MYC MEDICAL ADVICE (OUTPATIENT)
Dept: OBGYN | Facility: OTHER | Age: 33
End: 2023-04-07
Payer: COMMERCIAL

## 2023-04-07 LAB
DRVVT SCREEN RATIO: 0.88
INR PPP: 1.06 (ref 0.85–1.15)
LA PPP-IMP: NEGATIVE
LUPUS INTERPRETATION: NORMAL
PTT RATIO: 1.06
THROMBIN TIME: 15.8 SECONDS (ref 13–19)

## 2023-04-08 LAB
B2 GLYCOPROT1 IGG SERPL IA-ACNC: <0.8 U/ML
CARDIOLIPIN IGG SER IA-ACNC: <2 GPL-U/ML
CARDIOLIPIN IGG SER IA-ACNC: NEGATIVE
CARDIOLIPIN IGM SER IA-ACNC: <2 MPL-U/ML
CARDIOLIPIN IGM SER IA-ACNC: NEGATIVE

## 2023-04-10 NOTE — TELEPHONE ENCOUNTER
"Per Dr. Liu:   \"Can you please let Nikky know that I reviewed the semen analysis and saw the abnormal value-- this is okay for us to proceed with intrauterine insemination procedure if she is interested in that. I think the insemination procedure will work better than having her doing timed intercourse at home with the way this SA result was not quiet normal.     If she is interested in starting this process to help with ovulation induction for insemination we can have a follow up office visit to go over the details.     We should also make sure to have an HSG scheduled before we start any cycles.     Thanks!\"    Manju Beltran RN on 4/10/2023 at 2:40 PM    "

## 2023-05-22 DIAGNOSIS — F32.0 MILD MAJOR DEPRESSION (H): ICD-10-CM

## 2023-05-22 RX ORDER — BUPROPION HYDROCHLORIDE 150 MG/1
150 TABLET ORAL EVERY MORNING
Qty: 90 TABLET | Refills: 4 | Status: SHIPPED | OUTPATIENT
Start: 2023-05-22 | End: 2023-05-31

## 2023-05-31 ENCOUNTER — OFFICE VISIT (OUTPATIENT)
Dept: FAMILY MEDICINE | Facility: OTHER | Age: 33
End: 2023-05-31
Attending: FAMILY MEDICINE
Payer: COMMERCIAL

## 2023-05-31 VITALS
HEART RATE: 73 BPM | SYSTOLIC BLOOD PRESSURE: 118 MMHG | DIASTOLIC BLOOD PRESSURE: 76 MMHG | RESPIRATION RATE: 16 BRPM | BODY MASS INDEX: 20.56 KG/M2 | WEIGHT: 145.2 LBS | OXYGEN SATURATION: 100 % | TEMPERATURE: 97.8 F

## 2023-05-31 DIAGNOSIS — F32.0 MILD MAJOR DEPRESSION (H): ICD-10-CM

## 2023-05-31 DIAGNOSIS — F90.2 ATTENTION DEFICIT HYPERACTIVITY DISORDER (ADHD), COMBINED TYPE: ICD-10-CM

## 2023-05-31 PROCEDURE — 99213 OFFICE O/P EST LOW 20 MIN: CPT | Performed by: FAMILY MEDICINE

## 2023-05-31 RX ORDER — METHYLPHENIDATE HYDROCHLORIDE 20 MG/1
20 CAPSULE, EXTENDED RELEASE ORAL DAILY
Qty: 30 CAPSULE | Refills: 0 | Status: SHIPPED | OUTPATIENT
Start: 2023-05-31

## 2023-05-31 RX ORDER — BUPROPION HYDROCHLORIDE 150 MG/1
150 TABLET ORAL EVERY MORNING
Qty: 90 TABLET | Refills: 4 | Status: SHIPPED | OUTPATIENT
Start: 2023-05-31 | End: 2023-09-29

## 2023-05-31 RX ORDER — METHYLPHENIDATE HYDROCHLORIDE 20 MG/1
20 CAPSULE, EXTENDED RELEASE ORAL DAILY
Qty: 30 CAPSULE | Refills: 0 | Status: SHIPPED | OUTPATIENT
Start: 2023-07-30 | End: 2023-08-29

## 2023-05-31 RX ORDER — METHYLPHENIDATE HYDROCHLORIDE 20 MG/1
20 CAPSULE, EXTENDED RELEASE ORAL DAILY
Qty: 30 CAPSULE | Refills: 0 | Status: SHIPPED | OUTPATIENT
Start: 2023-06-30

## 2023-05-31 ASSESSMENT — PATIENT HEALTH QUESTIONNAIRE - PHQ9
10. IF YOU CHECKED OFF ANY PROBLEMS, HOW DIFFICULT HAVE THESE PROBLEMS MADE IT FOR YOU TO DO YOUR WORK, TAKE CARE OF THINGS AT HOME, OR GET ALONG WITH OTHER PEOPLE: SOMEWHAT DIFFICULT
SUM OF ALL RESPONSES TO PHQ QUESTIONS 1-9: 4
SUM OF ALL RESPONSES TO PHQ QUESTIONS 1-9: 4

## 2023-05-31 ASSESSMENT — ENCOUNTER SYMPTOMS
ARTHRALGIAS: 0
APNEA: 0
STRIDOR: 0
APPETITE CHANGE: 0
HYPERACTIVE: 0
COLOR CHANGE: 0
MYALGIAS: 0
AGITATION: 0
DIFFICULTY URINATING: 0
NERVOUS/ANXIOUS: 0
SHORTNESS OF BREATH: 0
DYSPHORIC MOOD: 0
NUMBNESS: 0
ACTIVITY CHANGE: 0
SLEEP DISTURBANCE: 0
DIZZINESS: 0
HEADACHES: 0
FACIAL ASYMMETRY: 0
CHEST TIGHTNESS: 0
PALPITATIONS: 0
EYE DISCHARGE: 0
DYSURIA: 0
EYE ITCHING: 0
LIGHT-HEADEDNESS: 0
CHOKING: 0

## 2023-05-31 ASSESSMENT — PAIN SCALES - GENERAL: PAINLEVEL: NO PAIN (0)

## 2023-05-31 NOTE — NURSING NOTE
"Chief Complaint   Patient presents with     Recheck Medication       Initial /76   Pulse 73   Temp 97.8  F (36.6  C) (Tympanic)   Resp 16   Wt 65.9 kg (145 lb 3.2 oz)   LMP 05/02/2023 (Exact Date)   SpO2 100%   BMI 20.56 kg/m   Estimated body mass index is 20.56 kg/m  as calculated from the following:    Height as of 12/23/22: 1.79 m (5' 10.47\").    Weight as of this encounter: 65.9 kg (145 lb 3.2 oz).  Medication Reconciliation: complete    FOOD SECURITY SCREENING QUESTIONS  Hunger Vital Signs:  Within the past 12 months we worried whether our food would run out before we got money to buy more. Never  Within the past 12 months the food we bought just didn't last and we didn't have money to get more. Never  Olga Brito LPN 5/31/2023 8:05 AM        "

## 2023-05-31 NOTE — PROGRESS NOTES
Assessment & Plan     (F32.0) Mild major depression (H)  Comment: PHQ-9 score today 4, down from a 9 in February 2023.  Patient reports that her depression is well managed with Wellbutrin and Zoloft.  We will continue these medications at this time.  Consider increasing Wellbutrin if patient becomes pregnant and needs to decrease her dose of Ritalin.  Plan: buPROPion (WELLBUTRIN XL) 150 MG 24 hr tablet,         sertraline (ZOLOFT) 50 MG tablet          (F90.2) Attention deficit hyperactivity disorder (ADHD), combined type  Comment: Patient's ADHD well controlled with Ritalin LA.  Continue at current dose.  Plan to decrease medication dosage if patient becomes pregnant.  Plan: methylphenidate (RITALIN LA) 20 MG 24 hr         capsule, methylphenidate (RITALIN LA) 20 MG 24         hr capsule, methylphenidate (RITALIN LA) 20 MG         24 hr capsule                         No follow-ups on file.    Patient was seen and examined by me as well as Rasta Buck, MS3    Chance Daly MD  [facility] AND [facility]    Subjective   Nikky is a 32 year old, presenting for the following health issues:  Recheck Medication        5/31/2023     8:02 AM   Additional Questions   Roomed by ODIN Espinoza   Accompanied by Self         5/31/2023     8:02 AM   Patient Reported Additional Medications   Patient reports taking the following new medications N/A     Patient presenting to clinic for medication refill.  Patient is using Wellbutrin and Zoloft for anxiety/depression management.  This medication regimen is treating her mental health well.  Patient is using Ritalin 20 mg long-acting daily.  She is stable on this dose.  Patient is taking Enbrel for rheumatoid arthritis and gets this refilled through her rheumatologist which she sees every 6 months.  Patient has no other issues that she would like to discuss at this time.  Patient is currently working with Dr. Liu I will regarding fertility, as her and her  are  actively trying to become pregnant.  Patient's  is seeing urology for low sperm motility.    History of Present Illness       Reason for visit:  Adhd medication    She eats 0-1 servings of fruits and vegetables daily.She consumes 1 sweetened beverage(s) daily.She exercises with enough effort to increase her heart rate 9 or less minutes per day.  She exercises with enough effort to increase her heart rate 3 or less days per week.   She is taking medications regularly.    Today's PHQ-9         PHQ-9 Total Score: 4    PHQ-9 Q9 Thoughts of better off dead/self-harm past 2 weeks :   Not at all    How difficult have these problems made it for you to do your work, take care of things at home, or get along with other people: Somewhat difficult         6/11/2022     9:19 AM 2/28/2023     9:12 PM 5/31/2023     8:00 AM   PHQ   PHQ-9 Total Score 6 8 4   Q9: Thoughts of better off dead/self-harm past 2 weeks Not at all Not at all Not at all                 Review of Systems   Constitutional: Negative for activity change and appetite change.   HENT: Negative for congestion, dental problem and drooling.    Eyes: Negative for discharge and itching.   Respiratory: Negative for apnea, choking, chest tightness, shortness of breath and stridor.    Cardiovascular: Negative for chest pain, palpitations and peripheral edema.   Endocrine: Negative for cold intolerance and heat intolerance.   Genitourinary: Negative for difficulty urinating, dyspareunia and dysuria.   Musculoskeletal: Negative for arthralgias and myalgias.   Skin: Negative for color change.   Neurological: Negative for dizziness, facial asymmetry, light-headedness, numbness and headaches.   Psychiatric/Behavioral: Negative for agitation, behavioral problems, dysphoric mood, mood changes, self-injury, sleep disturbance and suicidal ideas. The patient is not nervous/anxious and is not hyperactive.             Objective    /76   Pulse 73   Temp 97.8  F (36.6  C)  (Tympanic)   Resp 16   Wt 65.9 kg (145 lb 3.2 oz)   LMP 05/02/2023 (Exact Date)   SpO2 100%   BMI 20.56 kg/m    Body mass index is 20.56 kg/m .  Physical Exam  HENT:      Head: Normocephalic.      Mouth/Throat:      Mouth: Mucous membranes are moist.   Cardiovascular:      Rate and Rhythm: Normal rate and regular rhythm.      Pulses: Normal pulses.      Heart sounds: Normal heart sounds.   Pulmonary:      Effort: Pulmonary effort is normal.      Breath sounds: Normal breath sounds.   Abdominal:      General: Abdomen is flat.   Neurological:      General: No focal deficit present.      Mental Status: She is alert. Mental status is at baseline.   Psychiatric:         Mood and Affect: Mood normal.         Thought Content: Thought content normal.         Judgment: Judgment normal.                ----- Services Performed by a MEDICAL STUDENT in Presence of RESIDENT/FELLOW Physician-------      Rasta Buck on 5/31/2023 at 8:31 AM

## 2023-08-23 ENCOUNTER — MYC MEDICAL ADVICE (OUTPATIENT)
Dept: OBGYN | Facility: OTHER | Age: 33
End: 2023-08-23
Payer: COMMERCIAL

## 2023-08-23 ASSESSMENT — ANXIETY QUESTIONNAIRES
2. NOT BEING ABLE TO STOP OR CONTROL WORRYING: SEVERAL DAYS
3. WORRYING TOO MUCH ABOUT DIFFERENT THINGS: SEVERAL DAYS
5. BEING SO RESTLESS THAT IT IS HARD TO SIT STILL: SEVERAL DAYS
1. FEELING NERVOUS, ANXIOUS, OR ON EDGE: MORE THAN HALF THE DAYS
6. BECOMING EASILY ANNOYED OR IRRITABLE: SEVERAL DAYS
IF YOU CHECKED OFF ANY PROBLEMS ON THIS QUESTIONNAIRE, HOW DIFFICULT HAVE THESE PROBLEMS MADE IT FOR YOU TO DO YOUR WORK, TAKE CARE OF THINGS AT HOME, OR GET ALONG WITH OTHER PEOPLE: VERY DIFFICULT
GAD7 TOTAL SCORE: 7
4. TROUBLE RELAXING: SEVERAL DAYS
GAD7 TOTAL SCORE: 7
7. FEELING AFRAID AS IF SOMETHING AWFUL MIGHT HAPPEN: NOT AT ALL

## 2023-08-30 ENCOUNTER — OFFICE VISIT (OUTPATIENT)
Dept: FAMILY MEDICINE | Facility: OTHER | Age: 33
End: 2023-08-30
Attending: FAMILY MEDICINE
Payer: COMMERCIAL

## 2023-08-30 VITALS
TEMPERATURE: 97.8 F | SYSTOLIC BLOOD PRESSURE: 112 MMHG | DIASTOLIC BLOOD PRESSURE: 72 MMHG | RESPIRATION RATE: 16 BRPM | WEIGHT: 146.6 LBS | OXYGEN SATURATION: 100 % | HEART RATE: 84 BPM | BODY MASS INDEX: 20.75 KG/M2

## 2023-08-30 DIAGNOSIS — Z11.59 NEED FOR HEPATITIS C SCREENING TEST: ICD-10-CM

## 2023-08-30 DIAGNOSIS — Z11.4 SCREENING FOR HIV (HUMAN IMMUNODEFICIENCY VIRUS): ICD-10-CM

## 2023-08-30 DIAGNOSIS — F90.2 ATTENTION DEFICIT HYPERACTIVITY DISORDER (ADHD), COMBINED TYPE: Primary | ICD-10-CM

## 2023-08-30 DIAGNOSIS — F41.1 GAD (GENERALIZED ANXIETY DISORDER): ICD-10-CM

## 2023-08-30 PROCEDURE — 87389 HIV-1 AG W/HIV-1&-2 AB AG IA: CPT | Mod: ZL | Performed by: FAMILY MEDICINE

## 2023-08-30 PROCEDURE — 36415 COLL VENOUS BLD VENIPUNCTURE: CPT | Mod: ZL | Performed by: FAMILY MEDICINE

## 2023-08-30 PROCEDURE — 99213 OFFICE O/P EST LOW 20 MIN: CPT | Performed by: FAMILY MEDICINE

## 2023-08-30 PROCEDURE — 86803 HEPATITIS C AB TEST: CPT | Mod: ZL | Performed by: FAMILY MEDICINE

## 2023-08-30 RX ORDER — METHYLPHENIDATE HYDROCHLORIDE 20 MG/1
20 CAPSULE, EXTENDED RELEASE ORAL DAILY
Qty: 30 CAPSULE | Refills: 0 | Status: SHIPPED | OUTPATIENT
Start: 2023-08-30 | End: 2023-09-29

## 2023-08-30 RX ORDER — SULINDAC 200 MG/1
1 TABLET ORAL DAILY
COMMUNITY
Start: 2023-08-25

## 2023-08-30 RX ORDER — METHYLPHENIDATE HYDROCHLORIDE 20 MG/1
20 CAPSULE, EXTENDED RELEASE ORAL DAILY
Qty: 30 CAPSULE | Refills: 0 | Status: SHIPPED | OUTPATIENT
Start: 2023-09-30 | End: 2023-10-30

## 2023-08-30 RX ORDER — METHYLPHENIDATE HYDROCHLORIDE 20 MG/1
20 CAPSULE, EXTENDED RELEASE ORAL DAILY
Qty: 30 CAPSULE | Refills: 0 | Status: SHIPPED | OUTPATIENT
Start: 2023-10-31 | End: 2023-11-30

## 2023-08-30 RX ORDER — SERTRALINE HYDROCHLORIDE 25 MG/1
25 TABLET, FILM COATED ORAL DAILY
Qty: 90 TABLET | Refills: 4 | Status: SHIPPED | OUTPATIENT
Start: 2023-08-30

## 2023-08-30 ASSESSMENT — PAIN SCALES - GENERAL: PAINLEVEL: NO PAIN (0)

## 2023-08-30 NOTE — NURSING NOTE
"Chief Complaint   Patient presents with    Recheck Medication       Initial /72   Pulse 84   Temp 97.8  F (36.6  C) (Tympanic)   Resp 16   Wt 66.5 kg (146 lb 9.6 oz)   LMP 08/10/2023 (Approximate)   SpO2 100%   BMI 20.75 kg/m   Estimated body mass index is 20.75 kg/m  as calculated from the following:    Height as of 12/23/22: 1.79 m (5' 10.47\").    Weight as of this encounter: 66.5 kg (146 lb 9.6 oz).  Medication Reconciliation: complete    FOOD SECURITY SCREENING QUESTIONS  Hunger Vital Signs:  Within the past 12 months we worried whether our food would run out before we got money to buy more. Never  Within the past 12 months the food we bought just didn't last and we didn't have money to get more. Never  Olga Brito LPN 8/30/2023 8:08 AM          "

## 2023-08-30 NOTE — PROGRESS NOTES
Assessment & Plan     (F90.2) Attention deficit hyperactivity disorder (ADHD), combined type  (primary encounter diagnosis)  Comment: stable,  reviewed. Low risk for misuse or diversion. Refilled for 3 months total  Plan: methylphenidate (RITALIN LA) 20 MG 24 hr         capsule, methylphenidate (RITALIN LA) 20 MG 24         hr capsule, methylphenidate (RITALIN LA) 20 MG         24 hr capsule             (Z11.4) Screening for HIV (human immunodeficiency virus)  Comment:    Plan: HIV Screening             (Z11.59) Need for hepatitis C screening test  Comment:    Plan: Hepatitis C Screen Reflex to HCV RNA Quant and         Genotype             (F41.1) NEIDA (generalized anxiety disorder)  Comment: mildly worsened, perhaps due to infertility. Is worried about side effects from zoloft, so will do a mild dose increaase, from 50 to 75 milligram daily   Plan: sertraline (ZOLOFT) 25 MG tablet                          Return in about 3 months (around 11/30/2023).    Chance Daly MD  [facility] [place]   Nikky is a 32 year old, presenting for the following health issues:  Recheck Medication      8/30/2023     8:05 AM   Additional Questions   Roomed by ODIN Espinoza   Accompanied by Self         8/30/2023     8:05 AM   Patient Reported Additional Medications   Patient reports taking the following new medications Nasocort PRN       History of Present Illness       Mental Health Follow-up:  Patient presents to follow-up on Anxiety.    Patient's anxiety since last visit has been:  Worse  The patient is having other symptoms associated with anxiety.  Any significant life events: No  Patient is feeling anxious or having panic attacks.  Patient has no concerns about alcohol or drug use.    Reason for visit:  Medication management, anxiety and ADHD    She eats 0-1 servings of fruits and vegetables daily.She consumes 2 sweetened beverage(s) daily.She exercises with enough effort to increase her heart  rate 9 or less minutes per day.  She exercises with enough effort to increase her heart rate 3 or less days per week.   She is taking medications regularly.     Depression I swell controlled, but more anxious lately. No clear trigger. Seems to just happen randomly. Some work stress with being overworked. Trying to get pregnant but not having success yet and has a follow up next month with OB. Considering clomid next. Menses are spotty, bleeding for 2-3 days, stops for a few then bleeds more. Night sweats from zoloft are improved. Had a rheum follow up last week, was all stable. Some wrist pain felt to be OA and using more nsaids and topicals for treatment.    Regarding her ritalin, feels this is really helping. On days she misses it has a harder to focus. Sleeping reasonably well. No weight loss.        12/23/2022     2:52 PM 2/28/2023     9:14 PM 8/23/2023    11:34 AM   NEIDA-7 SCORE   Total Score  6 (mild anxiety) 7 (mild anxiety)   Total Score 5 6 7           Current Outpatient Medications   Medication    buPROPion (WELLBUTRIN XL) 150 MG 24 hr tablet    Etanercept (ENBREL SURECLICK) 50 MG/ML autoinjector    methylphenidate (RITALIN LA) 20 MG 24 hr capsule    methylphenidate (RITALIN LA) 20 MG 24 hr capsule    sertraline (ZOLOFT) 50 MG tablet    sulindac (CLINORIL) 200 MG tablet     No current facility-administered medications for this visit.               Review of Systems         Objective    /72   Pulse 84   Temp 97.8  F (36.6  C) (Tympanic)   Resp 16   Wt 66.5 kg (146 lb 9.6 oz)   LMP 08/10/2023 (Approximate)   SpO2 100%   BMI 20.75 kg/m    Body mass index is 20.75 kg/m .  Physical Exam  Constitutional:       Appearance: Normal appearance.   Neurological:      General: No focal deficit present.      Mental Status: She is alert.   Psychiatric:         Mood and Affect: Mood normal.         Behavior: Behavior normal.         Thought Content: Thought content normal.

## 2023-08-31 LAB
HCV AB SERPL QL IA: NONREACTIVE
HIV 1+2 AB+HIV1 P24 AG SERPL QL IA: NONREACTIVE

## 2023-09-25 ENCOUNTER — OFFICE VISIT (OUTPATIENT)
Dept: OBGYN | Facility: OTHER | Age: 33
End: 2023-09-25
Attending: STUDENT IN AN ORGANIZED HEALTH CARE EDUCATION/TRAINING PROGRAM
Payer: COMMERCIAL

## 2023-09-25 VITALS
DIASTOLIC BLOOD PRESSURE: 74 MMHG | BODY MASS INDEX: 20.81 KG/M2 | WEIGHT: 147 LBS | SYSTOLIC BLOOD PRESSURE: 116 MMHG | HEART RATE: 84 BPM

## 2023-09-25 DIAGNOSIS — N97.9 FEMALE INFERTILITY: Primary | ICD-10-CM

## 2023-09-25 PROCEDURE — 99215 OFFICE O/P EST HI 40 MIN: CPT | Performed by: STUDENT IN AN ORGANIZED HEALTH CARE EDUCATION/TRAINING PROGRAM

## 2023-09-25 PROCEDURE — 99417 PROLNG OP E/M EACH 15 MIN: CPT | Performed by: STUDENT IN AN ORGANIZED HEALTH CARE EDUCATION/TRAINING PROGRAM

## 2023-09-25 ASSESSMENT — PATIENT HEALTH QUESTIONNAIRE - PHQ9
SUM OF ALL RESPONSES TO PHQ QUESTIONS 1-9: 4
SUM OF ALL RESPONSES TO PHQ QUESTIONS 1-9: 4
10. IF YOU CHECKED OFF ANY PROBLEMS, HOW DIFFICULT HAVE THESE PROBLEMS MADE IT FOR YOU TO DO YOUR WORK, TAKE CARE OF THINGS AT HOME, OR GET ALONG WITH OTHER PEOPLE: SOMEWHAT DIFFICULT

## 2023-09-25 NOTE — PROGRESS NOTES
Follow-Up Visit    S: Ms. Nikky Hurt is a 32 year old  here for follow up and next steps in fertility workup. We originally met in 2023 and started the workup for infertility and discussed ovulation induction. Workup at that time showed normal hormone profile or Nikky, normal pelvic US. Her partner had a SA which showed low motility (18%), but he was told later that it may have been a typo and no further follow up was offered from his provider.     Nikky's periods have been coming approximately every 28-30 days, but have had an interrupted bleeding schedule. She will have two days of light bleeding, then take a few days break (ranging from 2-4 day) before her bleeding will return and stay at a heavier flow for about 4 days.     LMP -, then - (August was very similar, except it started 8/10)    She denies any new symptoms or medical concerns. She and her  are interested in next steps and ovulation induction cycles +/- IUI.    O:  /74   Pulse 84   Wt 66.7 kg (147 lb)   LMP 2023 (Approximate)   BMI 20.81 kg/m    Gen: Well-appearing, NAD  Pulm: nonlabored    Pelvic: Deferred as she has had normal US and will plan HSG with next period.    A/P:  Ms. Nikky Hurt is a 32 year old  here for next steps in infertility workup and starting ovulation induction cycles.    #  Infertility: unknown etiology at this time- suspect anovulation.  - Labs: 2023              Serum HCG: <1              FSH: 3.9              LH: 8.1              E2: 84              TSH: 2.7              PRL: 16               Antiphospholipid AB workup (anti-cardiolipin, B2-glycoprotein, lupus anticoagulant): negative     - Male factor considerations             SA shows low motility (18%)- but all other normal parameters   Discussed possibly repeating SA with another group as he was told the original report may have been a typo vs. Planning IUI right away. They will continue to discuss.  IUI offered right away if they desire.     - Anatomical/tract considerations: No prior history of GC/Chlam              HSG planned for after her next period comes (week of Oct 16th most likely)   Prior normal pelvic US from November 2022       # PMH  -- Juvenile rheumatoid arthritis   Currently on Enbrel- her rheumatologist discussed that they will wean off when pregnancy occurs. Anticipate the auto-immune disease to improve in pregnancy  -- + HEATH: related to the above-- negative follow up APAS testing  -- Discussed that Ritalin is not recommended in pregnancy and at this time if she would like to try to start weaning off, that would be helpful for when she does get pregnant. She will continue on Wellbutrin.    If HSG is normal, plan to start with letrozole ovulation induction cycles    Urine pregnancy test before starting Provera    Provera 10 mg for 10 days to induce a withdrawal bleed    Cycle Day 1/ first day of your withdrawal bleed: Call the clinic to let us know  Cycle Day 3, 4, 5, 6, 7: Letrozole 2.5 mg PO daily  Cycle Day 10, 11 or 12: Follicle count Ultrasound and nurse visit in the clinic (anticipate follicle growth of approximately 2 mm each day)   If follicle <16 mm, serial US every 1-2 days until we reach ideal goal 18-22mm  If follicle >16mm, can instruct to Ovidrel trigger or wait a day with ideal goal 18-22mm  If 3 follicles are 15mm or greater, the cycle is stopped due to risk of multiples    Timing of Ovidrel trigger to be communicated via our nursing team  Scheduled IUI visit 24 hours after the trigger   Partner must present 1:15 minutes before to give a semen sample to be processed in the lab    We discussed that Letrozole is not FDA approved for this indication, and that an FDA approved alternative is available, Clomiphene Citrate. We also discussed that recent research within the [place] area has shown higher live birth rates in women who are using Letrozole in comparison to Clomiphene Citrate  and there is a lower risk of twin or higher gestation pregnancies. We discussed the risk of multiples if two follicles are growing with the cycle. We discussed that multiple gestation pregnancies are higher risk for both maternal and fetal health. She understands this and is comfortable with trigger with two follicles or less. We discussed the importance to discontinue a cycle if three or more follicles mature and the importance of abstinence during the remainder of that cycle. A follow up US will help confirm follicles have resolved.      We talked about the risk of ovarian hyperstimulation syndrome. As this is a much lower risk with oral medications compared to injectable GnRH medications, she will be alert for warning signs such as swelling, shortness  of breath, abdominal distension, rapid weight gain, nausea, vomiting. She is instructed to call for triage or present to the emergency room if it is after clinic hours.      Total amount of time spent during today's encounter including chart prep, face to face, counseling and documentation was 70 minutes  Karlene Liu MD on 9/25/2023 at 12:45 PM

## 2023-09-25 NOTE — LETTER
Letrozole Ovulation Induction Cycle    Urine pregnancy test before starting Provera    Provera 10 mg for 10 days to induce a withdrawal bleed    Cycle Day 1/ first day of your withdrawal bleed: Call the clinic to let us know  Cycle Day 3, 4, 5, 6, 7: Letrozole 2.5 mg PO daily  Cycle Day 10, 11 or 12: Follicle count Ultrasound and nurse visit in the clinic (anticipate follicle growth of approximately 2 mm each day)   If follicle <16 mm, serial US every 1-2 days until we reach ideal goal 18-22mm  If follicle >16mm, can instruct to Ovidrel trigger or wait a day with ideal goal 18-22mm  If 3 follicles are 15mm or greater, the cycle is stopped due to risk of multiples    Timing of Ovidrel trigger to be communicated via our nursing team  Scheduled IUI visit 24 hours after the trigger   Partner must present 1:15 minutes before to give a semen sample to be processed in the lab    We discussed that Letrozole is not FDA approved for this indication, and that an FDA approved alternative is available, Clomiphene Citrate. We also discussed that recent research within the [place] area has shown higher live birth rates in women who are using Letrozole in comparison to Clomiphene Citrate and there is a lower risk of twin or higher gestation pregnancies. We discussed the risk of multiples if two follicles are growing with the cycle. We discussed that multiple gestation pregnancies are higher risk for both maternal and fetal health. She understands this and is comfortable with trigger with two follicles or less. We discussed the importance to discontinue a cycle if three or more follicles mature and the importance of abstinence during the remainder of that cycle. A follow up US will help confirm follicles have resolved.      We talked about the risk of ovarian hyperstimulation syndrome. As this is a much lower risk with oral medications compared to injectable GnRH medications, she will be alert for warning signs such as  swelling, shortness  of breath, abdominal distension, rapid weight gain, nausea, vomiting. She is instructed to call for triage or present to the emergency room if it is after clinic hours.

## 2023-09-26 ENCOUNTER — MYC MEDICAL ADVICE (OUTPATIENT)
Dept: FAMILY MEDICINE | Facility: OTHER | Age: 33
End: 2023-09-26
Payer: COMMERCIAL

## 2023-09-26 DIAGNOSIS — F90.2 ATTENTION DEFICIT HYPERACTIVITY DISORDER (ADHD), COMBINED TYPE: Primary | ICD-10-CM

## 2023-09-29 ENCOUNTER — OFFICE VISIT (OUTPATIENT)
Dept: FAMILY MEDICINE | Facility: OTHER | Age: 33
End: 2023-09-29
Payer: COMMERCIAL

## 2023-09-29 VITALS
WEIGHT: 147.4 LBS | HEART RATE: 87 BPM | DIASTOLIC BLOOD PRESSURE: 72 MMHG | HEIGHT: 65 IN | SYSTOLIC BLOOD PRESSURE: 116 MMHG | RESPIRATION RATE: 12 BRPM | BODY MASS INDEX: 24.56 KG/M2 | OXYGEN SATURATION: 97 % | TEMPERATURE: 98.8 F

## 2023-09-29 DIAGNOSIS — J01.00 ACUTE NON-RECURRENT MAXILLARY SINUSITIS: Primary | ICD-10-CM

## 2023-09-29 PROCEDURE — 99213 OFFICE O/P EST LOW 20 MIN: CPT | Performed by: NURSE PRACTITIONER

## 2023-09-29 RX ORDER — BUPROPION HYDROCHLORIDE 300 MG/1
300 TABLET ORAL EVERY MORNING
Qty: 90 TABLET | Refills: 4 | Status: SHIPPED | OUTPATIENT
Start: 2023-09-29

## 2023-09-29 ASSESSMENT — PAIN SCALES - GENERAL: PAINLEVEL: MODERATE PAIN (5)

## 2023-09-29 NOTE — PROGRESS NOTES
ASSESSMENT/PLAN:     I have reviewed the nursing notes.  I have reviewed the findings, diagnosis, plan and need for follow up with the patient.          1. Acute non-recurrent maxillary sinusitis    - amoxicillin-clavulanate (AUGMENTIN) 875-125 MG tablet; Take 1 tablet by mouth 2 times daily for 7 days  Dispense: 14 tablet; Refill: 0    Symptoms persisting over 2 weeks with progressive worsening and patient immunocompromised due to use of Enbrel.      Symptomatic treatment - Encouraged fluids, salt water gargles, honey, elevation, humidifier, saline nasal spray, sinus rinse/netti pot, lozenges, tea, soup, smoothies, popsicles, topical vapor rub, rest, etc     May use over the counter sinus or cold medication PRN  May use over-the-counter Tylenol or ibuprofen PRN    Discussed warning signs/symptoms indicative of need to f/u  Follow up if symptoms persist or worsen or concerns      I explained my diagnostic considerations and recommendations to the patient, who voiced understanding and agreement with the treatment plan. All questions were answered. We discussed potential side effects of any prescribed or recommended therapies, as well as expectations for response to treatments.    Rin Philippe NP  [facility] AND [facility]      SUBJECTIVE:   Nikky Hurt is a 32 year old female who presents to clinic today for the following health issues:  Sinus     HPI  Sinus pressure for the past 2 weeks.  Now the past 4 days with increased sinus pressure, cough, wheezing , sore throat, nasal drainage/congestion, fatigue and body aches.  Mild chest heaviness and mild shortness of breath but more wheezing.  No fevers, chills or sweats.   Mild nausea.  No vomiting or diarrhea.  Appetite decreased.    She is on Enbrel for JA, most recent dose was 9/25/23.  States 2 negative home covid tests  Taking sinus decongestant, Nyquil, and Delsum.   Using vicks nasal and netti pot        Past Medical History:   Diagnosis Date     Acne, mild 11/18/2010    HEATH positive 7/11/2017    Attention deficit hyperactivity disorder (ADHD), combined type 2/1/2017    Overview:  Diagnosed in elementary age. Used some medications but cannot recall what at that time.     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 Followed at [facility] as a child.  Sees Dr Farmer twice a year    IBS (irritable bowel syndrome) 6/10/2015    Juvenile rheumatoid arthritis (H) 06/09/2014    Diagnosed at age 3 years, followed by [facility] as a child.  Follows Dr Farmer twice a year.    Myopia 3/23/2005    Tendinitis of extensor tendon of right hand 6/12/2018    Tendinitis of right elbow 2/1/2017    Varicella without complication     As a child     Past Surgical History:   Procedure Laterality Date    ARTHROSCOPY KNEE Left 10/2019    EXTRACTION(S) DENTAL       Social History     Tobacco Use    Smoking status: Never    Smokeless tobacco: Former     Types: Chew    Tobacco comments:     Quit smoking: Rarely, with driving, roomate smokes   Substance Use Topics    Alcohol use: Yes     Alcohol/week: 0.0 standard drinks of alcohol     Comment: Alcoholic Drinks/day: occasional     Current Outpatient Medications   Medication Sig Dispense Refill    buPROPion (WELLBUTRIN XL) 300 MG 24 hr tablet Take 1 tablet (300 mg) by mouth every morning 90 tablet 4    Etanercept (ENBREL SURECLICK) 50 MG/ML autoinjector Inject 50 mg Subcutaneous      Prenatal Vit-Fe Fumarate-FA (PRENATAL MULTIVITAMIN  PLUS IRON) 27-1 MG TABS Take by mouth daily      sertraline (ZOLOFT) 25 MG tablet Take 1 tablet (25 mg) by mouth daily Along with 50 milligram for 75 milligram total daily 90 tablet 4    sertraline (ZOLOFT) 50 MG tablet Take 1 tablet (50 mg) by mouth daily 90 tablet 4    sulindac (CLINORIL) 200 MG tablet Take 1 tablet by mouth daily      methylphenidate (RITALIN LA) 20 MG 24 hr capsule Take 20 mg by mouth daily for 30 days  "(Patient not taking: Reported on 9/29/2023) 30 capsule 0    [START ON 9/30/2023] methylphenidate (RITALIN LA) 20 MG 24 hr capsule Take 20 mg by mouth daily for 30 days (Patient not taking: Reported on 9/29/2023) 30 capsule 0    [START ON 10/31/2023] methylphenidate (RITALIN LA) 20 MG 24 hr capsule Take 20 mg by mouth daily for 30 days (Patient not taking: Reported on 9/29/2023) 30 capsule 0    methylphenidate (RITALIN LA) 20 MG 24 hr capsule Take 20 mg by mouth daily (Patient not taking: Reported on 9/29/2023) 30 capsule 0    methylphenidate (RITALIN LA) 20 MG 24 hr capsule Take 20 mg by mouth daily (Patient not taking: Reported on 9/29/2023) 30 capsule 0     No Known Allergies      Past medical history, past surgical history, current medications and allergies reviewed and accurate to the best of my knowledge.        OBJECTIVE:     /72 (BP Location: Right arm, Patient Position: Sitting, Cuff Size: Adult Regular)   Pulse 87   Temp 98.8  F (37.1  C) (Tympanic)   Resp 12   Ht 1.651 m (5' 5\")   Wt 66.9 kg (147 lb 6.4 oz)   LMP 09/08/2023 (Approximate)   SpO2 97%   BMI 24.53 kg/m    Body mass index is 24.53 kg/m .      Physical Exam  General Appearance: Miserable ill appearing adult female, non toxic, appropriate appearance for age. No acute distress  Ears: Left TM intact, no erythema, no effusion, no bulging, no purulence.  Right TM intact, no erythema, no effusion, no bulging, no purulence.  Left auditory canal clear without drainage or bleeding.  Right auditory canal clear without drainage or bleeding.  Normal external ears, non tender.  Eyes: conjunctivae normal without erythema or irritation, corneas clear, no drainage or crusting, no eyelid swelling, pupils equal   Orophayrnx: moist mucous membranes, pharynx without erythema, tonsils without hypertrophy, tonsils without erythema, no tonsillar exudates, no oral lesions, no palate petechiae, no post nasal drip seen, no trismus, voice clear.    Sinuses:  " Diffuse maxillary sinus tenderness upon palpation   Nose:  Nares with erythema, drainage and congestion, no noted narrowing.  Neck: supple without adenopathy  Respiratory: normal chest wall and respirations.  Normal effort.  Clear to auscultation bilaterally, no wheezing, crackles or rhonchi.  No increased work of breathing.  No cough appreciated.  Cardiac: RRR with no murmurs  Musculoskeletal:  Equal movement of bilateral upper extremities.  Equal movement of bilateral lower extremities.  Normal gait.    Psychological: normal affect, alert, oriented, and pleasant.

## 2023-09-29 NOTE — NURSING NOTE
"Pt presents to  for sinus infection. Pt states she has had sinus pressure x2 wks, but within the past 4 days she has started coughing, mucus production, wheezing, some body aches, more pressure in her head. Pt has taken two home covid tests - both negative.  Pt has been taking delsym cough syrup,nyquil, and otc sinus meds.    Chief Complaint   Patient presents with    Sinus Problem       FOOD SECURITY SCREENING QUESTIONS  Hunger Vital Signs:  Within the past 12 months we worried whether our food would run out before we got money to buy more. Never  Within the past 12 months the food we bought just didn't last and we didn't have money to get more. Never  Melanie Dearmon 9/29/2023 10:48 AM      Initial /72 (BP Location: Right arm, Patient Position: Sitting, Cuff Size: Adult Regular)   Pulse 87   Temp 98.8  F (37.1  C) (Tympanic)   Resp 12   Ht 1.651 m (5' 5\")   Wt 66.9 kg (147 lb 6.4 oz)   LMP 09/08/2023 (Approximate)   SpO2 97%   BMI 24.53 kg/m   Estimated body mass index is 24.53 kg/m  as calculated from the following:    Height as of this encounter: 1.651 m (5' 5\").    Weight as of this encounter: 66.9 kg (147 lb 6.4 oz).  Medication Reconciliation: complete    Melanie Dearmon    "

## 2023-10-08 ENCOUNTER — HEALTH MAINTENANCE LETTER (OUTPATIENT)
Age: 33
End: 2023-10-08

## 2023-10-17 ENCOUNTER — TELEPHONE (OUTPATIENT)
Dept: OBGYN | Facility: OTHER | Age: 33
End: 2023-10-17
Payer: COMMERCIAL

## 2023-10-17 DIAGNOSIS — Z01.812 PRE-PROCEDURE LAB EXAM: Primary | ICD-10-CM

## 2023-10-17 NOTE — TELEPHONE ENCOUNTER
UPT ordered to be completed prior to HSG.    Jeimy Mosley RN...................10/17/2023 9:25 AM

## 2023-10-18 ENCOUNTER — HOSPITAL ENCOUNTER (OUTPATIENT)
Dept: GENERAL RADIOLOGY | Facility: OTHER | Age: 33
Discharge: HOME | End: 2023-10-18
Attending: STUDENT IN AN ORGANIZED HEALTH CARE EDUCATION/TRAINING PROGRAM
Payer: COMMERCIAL

## 2023-10-18 ENCOUNTER — OFFICE VISIT (OUTPATIENT)
Dept: OBGYN | Facility: OTHER | Age: 33
End: 2023-10-18
Attending: STUDENT IN AN ORGANIZED HEALTH CARE EDUCATION/TRAINING PROGRAM
Payer: COMMERCIAL

## 2023-10-18 ENCOUNTER — LAB (OUTPATIENT)
Dept: LAB | Facility: OTHER | Age: 33
End: 2023-10-18
Attending: STUDENT IN AN ORGANIZED HEALTH CARE EDUCATION/TRAINING PROGRAM
Payer: COMMERCIAL

## 2023-10-18 DIAGNOSIS — N97.9 FEMALE INFERTILITY: ICD-10-CM

## 2023-10-18 DIAGNOSIS — Z01.812 PRE-PROCEDURE LAB EXAM: ICD-10-CM

## 2023-10-18 DIAGNOSIS — N97.9 FEMALE INFERTILITY: Primary | ICD-10-CM

## 2023-10-18 LAB — HCG UR QL: NEGATIVE

## 2023-10-18 PROCEDURE — 58340 CATHETER FOR HYSTEROGRAPHY: CPT | Performed by: STUDENT IN AN ORGANIZED HEALTH CARE EDUCATION/TRAINING PROGRAM

## 2023-10-18 PROCEDURE — 81025 URINE PREGNANCY TEST: CPT | Mod: ZL

## 2023-10-18 PROCEDURE — 74740 X-RAY FEMALE GENITAL TRACT: CPT

## 2023-10-18 ASSESSMENT — PATIENT HEALTH QUESTIONNAIRE - PHQ9
10. IF YOU CHECKED OFF ANY PROBLEMS, HOW DIFFICULT HAVE THESE PROBLEMS MADE IT FOR YOU TO DO YOUR WORK, TAKE CARE OF THINGS AT HOME, OR GET ALONG WITH OTHER PEOPLE: VERY DIFFICULT
SUM OF ALL RESPONSES TO PHQ QUESTIONS 1-9: 6
SUM OF ALL RESPONSES TO PHQ QUESTIONS 1-9: 6

## 2023-10-18 NOTE — PROGRESS NOTES
Procedure: Hysterosalpingogram (HSG) Contrast Injection    Name:  Nikky Hurt  MRN:  [id number]  :  1990      Preoperative Diagnosis:  Unexplained infertility  Postoperative Diagnosis:  Same  UPT:  Negative  :  JUDITH YU MD  Complications:  None    Procedure Description:  Nikky was placed in the dorsolithotomy position and speculum was gently inserted to clearly visualize the cervix.  The cervix was cleaned with iodine solution three times.  The cervix was grasped with a single toothed tenaculum at the 12 o'clock position.  The HSG cannula was attached to a 30-cc syringe containing water-soluble contrast and the cannula was primed to ensure all air has been removed from the tubing tract. The tip of the cannula was then introduced into the external cervical os and gently advanced to allow the catheter balloon to be filled.  Contrast was injected to image the uterine cavity and fallopian tubes under fluoroscopic monitoring.    Dr. Lara was present during the procedure to provide instant feedback.    Findings: my preliminary findings-- formal read to follow by Dr. Lara  Uterine cavity:  Normal cavity   Fallopian tubes: Spillage noted from the left fallopian tube, proximal right tube showed dye, but no definitive spillage on the right side.    Nikky tolerated the procedure well.       Karlene Liu MD on 10/18/2023 at 3:19 PM

## 2023-11-21 ENCOUNTER — MYC MEDICAL ADVICE (OUTPATIENT)
Dept: FAMILY MEDICINE | Facility: OTHER | Age: 33
End: 2023-11-21
Payer: COMMERCIAL

## 2023-12-06 ENCOUNTER — MYC MEDICAL ADVICE (OUTPATIENT)
Dept: OBGYN | Facility: OTHER | Age: 33
End: 2023-12-06
Payer: COMMERCIAL

## 2023-12-06 DIAGNOSIS — N97.9 FEMALE INFERTILITY: Primary | ICD-10-CM

## 2023-12-06 NOTE — TELEPHONE ENCOUNTER
Day 1 of cycle 12/6/23.    OV note on 9/25/23-     Cycle Day 1/ first day of your withdrawal bleed: Call the clinic to let us know  Cycle Day 3, 4, 5, 6, 7: Letrozole 2.5 mg PO daily  Cycle Day 10, 11 or 12: Follicle count Ultrasound and nurse visit in the clinic (anticipate follicle growth of approximately 2 mm each day)              If follicle <16 mm, serial US every 1-2 days until we reach ideal goal 18-22mm  If follicle >16mm, can instruct to Ovidrel trigger or wait a day with ideal goal 18-22mm  If 3 follicles are 15mm or greater, the cycle is stopped due to risk of multiples     Timing of Ovidrel trigger to be communicated via our nursing team  Scheduled IUI visit 24 hours after the trigger              Partner must present 1:15 minutes before to give a semen sample to be processed in the lab       Order teed up for letrozole 2.5 mg and US. Please sign/edit order with plan.    Emelina Dietrich RN on 12/6/2023 at 3:02 PM

## 2023-12-07 RX ORDER — LETROZOLE 2.5 MG/1
2.5 TABLET, FILM COATED ORAL DAILY
Qty: 5 TABLET | Refills: 0 | Status: SHIPPED | OUTPATIENT
Start: 2023-12-07

## 2023-12-15 ENCOUNTER — OFFICE VISIT (OUTPATIENT)
Dept: OBGYN | Facility: OTHER | Age: 33
End: 2023-12-15
Attending: OBSTETRICS & GYNECOLOGY
Payer: COMMERCIAL

## 2023-12-15 VITALS
DIASTOLIC BLOOD PRESSURE: 84 MMHG | BODY MASS INDEX: 23.95 KG/M2 | WEIGHT: 143.9 LBS | HEART RATE: 84 BPM | OXYGEN SATURATION: 97 % | SYSTOLIC BLOOD PRESSURE: 120 MMHG

## 2023-12-15 DIAGNOSIS — N97.9 FEMALE INFERTILITY: Primary | ICD-10-CM

## 2023-12-15 NOTE — NURSING NOTE
"Chief Complaint   Patient presents with    Fertility     Follicle study        Pt presents to clinic for follicle study.     Initial /84 (BP Location: Right arm, Patient Position: Sitting, Cuff Size: Adult Regular)   Pulse 84   Wt 65.3 kg (143 lb 14.4 oz)   SpO2 97%   BMI 23.95 kg/m   Estimated body mass index is 23.95 kg/m  as calculated from the following:    Height as of 9/29/23: 1.651 m (5' 5\").    Weight as of this encounter: 65.3 kg (143 lb 14.4 oz).  Medication Reconciliation: complete    Winnie Brtiton RN    "

## 2024-01-02 ENCOUNTER — OFFICE VISIT (OUTPATIENT)
Dept: FAMILY MEDICINE | Facility: OTHER | Age: 34
End: 2024-01-02
Payer: COMMERCIAL

## 2024-01-02 VITALS
SYSTOLIC BLOOD PRESSURE: 114 MMHG | TEMPERATURE: 96.9 F | RESPIRATION RATE: 14 BRPM | WEIGHT: 145 LBS | BODY MASS INDEX: 24.16 KG/M2 | OXYGEN SATURATION: 97 % | DIASTOLIC BLOOD PRESSURE: 74 MMHG | HEART RATE: 88 BPM | HEIGHT: 65 IN

## 2024-01-02 DIAGNOSIS — J01.00 ACUTE MAXILLARY SINUSITIS, RECURRENCE NOT SPECIFIED: ICD-10-CM

## 2024-01-02 DIAGNOSIS — J04.0 LARYNGITIS: ICD-10-CM

## 2024-01-02 DIAGNOSIS — J20.9 ACUTE BRONCHITIS, UNSPECIFIED ORGANISM: Primary | ICD-10-CM

## 2024-01-02 DIAGNOSIS — Z79.899 HIGH RISK MEDICATION USE: ICD-10-CM

## 2024-01-02 PROCEDURE — 99213 OFFICE O/P EST LOW 20 MIN: CPT | Performed by: NURSE PRACTITIONER

## 2024-01-02 PROCEDURE — 250N000009 HC RX 250: Performed by: NURSE PRACTITIONER

## 2024-01-02 RX ORDER — ALBUTEROL SULFATE 90 UG/1
2 AEROSOL, METERED RESPIRATORY (INHALATION) EVERY 6 HOURS PRN
Qty: 18 G | Refills: 0 | Status: SHIPPED | OUTPATIENT
Start: 2024-01-02 | End: 2024-01-29

## 2024-01-02 RX ORDER — DEXAMETHASONE SODIUM PHOSPHATE 4 MG/ML
10 VIAL (ML) INJECTION ONCE
Status: COMPLETED | OUTPATIENT
Start: 2024-01-02 | End: 2024-01-02

## 2024-01-02 RX ADMIN — DEXAMETHASONE SODIUM PHOSPHATE 10 MG: 4 INJECTION, SOLUTION INTRAMUSCULAR; INTRAVENOUS at 15:29

## 2024-01-02 ASSESSMENT — PAIN SCALES - GENERAL: PAINLEVEL: SEVERE PAIN (7)

## 2024-01-02 NOTE — NURSING NOTE
"Chief Complaint   Patient presents with    Cough    Pharyngitis    Nasal Problem   Patient presents to clinic for symptoms that include a sore throat, cough, and sinus congestion. She thinks it may be a sinus infection. Symptoms started 1 week ago and she lost her voice on Saturday. She did mention having the flu about 2 weeks ago.      Shivani Denis on 1/2/2024 at 2:45 PM        Initial /74   Pulse 88   Temp 96.9  F (36.1  C) (Tympanic)   Resp 14   Ht 1.651 m (5' 5\")   Wt 65.8 kg (145 lb)   LMP 12/01/2023 (Within Days)   SpO2 97%   Breastfeeding No   BMI 24.13 kg/m   Estimated body mass index is 24.13 kg/m  as calculated from the following:    Height as of this encounter: 1.651 m (5' 5\").    Weight as of this encounter: 65.8 kg (145 lb).       FOOD SECURITY SCREENING QUESTIONS:    The next two questions are to help us understand your food security.  If you are feeling you need any assistance in this area, we have resources available to support you today.    Hunger Vital Signs:  Within the past 12 months we worried whether our food would run out before we got money to buy more. Never  Within the past 12 months the food we bought just didn't last and we didn't have money to get more. Never      Shivani Denis     "

## 2024-01-02 NOTE — PROGRESS NOTES
ASSESSMENT/PLAN:    I have reviewed the nursing notes.  I have reviewed the findings, diagnosis, plan and need for follow up with the patient.    1. Acute bronchitis, unspecified organism  - dexAMETHasone (DECADRON) injectable solution used ORALLY 10 mg  - albuterol (PROAIR HFA/PROVENTIL HFA/VENTOLIN HFA) 108 (90 Base) MCG/ACT inhaler; Inhale 2 puffs into the lungs every 6 hours as needed for shortness of breath, wheezing or cough  Dispense: 18 g; Refill: 0    2. Laryngitis  - dexAMETHasone (DECADRON) injectable solution used ORALLY 10 mg    3. Acute maxillary sinusitis, recurrence not specified  4. High risk medication use  (Immune suppressed with enbrel)   Recommend 3 days or so of symptomatic treatment with voice rest, and the below recommendations. If not improving then start antibiotic. Patient receptive to plan.   -Symptomatic treatment - Encouraged fluids, salt water gargles, honey (only if greater than 1 year in age due to risk of botulism), elevation, humidifier, sinus rinse/netti pot, lozenges, tea, topical vapor rub, popsicles, rest, etc   -May use over-the-counter Tylenol or ibuprofen PRN    Follow up if symptoms persist or worsen or concerns    I explained my diagnostic considerations and recommendations to the patient, who voiced understanding and agreement with the treatment plan. All questions were answered. We discussed potential side effects of any prescribed or recommended therapies, as well as expectations for response to treatments.    Katrina Cameron NP  1/2/2024  2:56 PM    HPI:  Nikky Hurt is a 33 year old female who presents to [facility] today for concerns of cough, sore throat, nasal problem, sinus congestion. She thinks it may be a sinus infection. Symptoms started 1 week ago and she lost her voice on Saturday. She did mention having the flu about 2 weeks ago (stomach flu).  2 Mondays ago she had a day of vomiting and diarrhea then recovered from that.  had this as well.  She denies fevers. Denies shortness of breath. Little wheezy at times, can breathe ok. The cough is mostly dry but sometimes productive. Has had pneumonia in the past.     Enbrel for RA - has had sinus infections in the past. Also has had to have steroids for inflammatory coughs in past. No asthma history.     Past Medical History:   Diagnosis Date    Acne, mild 11/18/2010    HEATH positive 7/11/2017    Attention deficit hyperactivity disorder (ADHD), combined type 2/1/2017    Overview:  Diagnosed in elementary age. Used some medications but cannot recall what at that time.     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 Followed at [facility] as a child.  Sees Dr Farmer twice a year    IBS (irritable bowel syndrome) 6/10/2015    Juvenile rheumatoid arthritis (H) 06/09/2014    Diagnosed at age 3 years, followed by [facility] as a child.  Follows Dr Farmer twice a year.    Myopia 3/23/2005    Tendinitis of extensor tendon of right hand 6/12/2018    Tendinitis of right elbow 2/1/2017    Varicella without complication     As a child     Past Surgical History:   Procedure Laterality Date    ARTHROSCOPY KNEE Left 10/2019    EXTRACTION(S) DENTAL       Social History     Tobacco Use    Smoking status: Never    Smokeless tobacco: Former     Types: Chew    Tobacco comments:     Quit smoking: Rarely, with driving, roomate smokes   Substance Use Topics    Alcohol use: Yes     Alcohol/week: 0.0 standard drinks of alcohol     Comment: Alcoholic Drinks/day: occasional     Current Outpatient Medications   Medication Sig Dispense Refill    albuterol (PROAIR HFA/PROVENTIL HFA/VENTOLIN HFA) 108 (90 Base) MCG/ACT inhaler Inhale 2 puffs into the lungs every 6 hours as needed for shortness of breath, wheezing or cough 18 g 0    buPROPion (WELLBUTRIN XL) 300 MG 24 hr tablet Take 1 tablet (300 mg) by mouth every morning 90 tablet 4    Etanercept (ENBREL  "SURECLICK) 50 MG/ML autoinjector Inject 50 mg Subcutaneous      letrozole (FEMARA) 2.5 MG tablet Take 1 tablet (2.5 mg) by mouth daily 5 tablet 0    Prenatal Vit-Fe Fumarate-FA (PRENATAL MULTIVITAMIN  PLUS IRON) 27-1 MG TABS Take by mouth daily      sertraline (ZOLOFT) 25 MG tablet Take 1 tablet (25 mg) by mouth daily Along with 50 milligram for 75 milligram total daily 90 tablet 4    sertraline (ZOLOFT) 50 MG tablet Take 1 tablet (50 mg) by mouth daily 90 tablet 4    sulindac (CLINORIL) 200 MG tablet Take 1 tablet by mouth daily      methylphenidate (RITALIN LA) 20 MG 24 hr capsule Take 20 mg by mouth daily (Patient not taking: Reported on 9/29/2023) 30 capsule 0    methylphenidate (RITALIN LA) 20 MG 24 hr capsule Take 20 mg by mouth daily (Patient not taking: Reported on 9/29/2023) 30 capsule 0     No Known Allergies  Past medical history, past surgical history, current medications and allergies reviewed and accurate to the best of my knowledge.      ROS:  Refer to HPI    /74   Pulse 88   Temp 96.9  F (36.1  C) (Tympanic)   Resp 14   Ht 1.651 m (5' 5\")   Wt 65.8 kg (145 lb)   LMP 12/01/2023 (Within Days)   SpO2 97%   Breastfeeding No   BMI 24.13 kg/m      EXAM:  General Appearance: Well appearing 33 year old female, appropriate appearance for age. No acute distress   Ears: Left TM intact, translucent with bony landmarks appreciated, no erythema, no effusion, no bulging, no purulence.  Right TM intact, translucent with bony landmarks appreciated, no erythema, no effusion, no bulging, no purulence.  Left auditory canal clear.  Right auditory canal clear.  Normal external ears, non tender.  Eyes: conjunctivae normal without erythema or irritation, corneas clear, no drainage or crusting, no eyelid swelling, pupils equal   Oropharynx: moist mucous membranes, posterior pharynx without erythema, tonsils symmetric, no erythema, no exudates or petechiae, no post nasal drip seen, no trismus, voice hoarse, " weak.    Sinuses:  + sinus tenderness upon palpation of the frontal or maxillary sinuses  Nose:  Bilateral nares: no erythema, no edema, no drainage or congestion   Neck: supple without adenopathy  Respiratory: normal chest wall and respirations.  Normal effort.  Clear to auscultation bilaterally, no wheezing, crackles or rhonchi.  No increased work of breathing.  + frequent dry, episodic cough appreciated.  Cardiac: RRR with no murmurs  Musculoskeletal:  Equal movement of bilateral upper extremities.  Equal movement of bilateral lower extremities.  Normal gait.    Neuro: Alert and oriented to person, place, and time.   Psychological: normal affect, alert, oriented, and pleasant.

## 2024-01-04 ENCOUNTER — MYC MEDICAL ADVICE (OUTPATIENT)
Dept: FAMILY MEDICINE | Facility: OTHER | Age: 34
End: 2024-01-04
Payer: COMMERCIAL

## 2024-01-04 DIAGNOSIS — J01.00 ACUTE MAXILLARY SINUSITIS, RECURRENCE NOT SPECIFIED: Primary | ICD-10-CM

## 2024-01-04 NOTE — TELEPHONE ENCOUNTER
Per [facility] note 1/2:    1. Acute bronchitis, unspecified organism  - dexAMETHasone (DECADRON) injectable solution used ORALLY 10 mg  - albuterol (PROAIR HFA/PROVENTIL HFA/VENTOLIN HFA) 108 (90 Base) MCG/ACT inhaler; Inhale 2 puffs into the lungs every 6 hours as needed for shortness of breath, wheezing or cough  Dispense: 18 g; Refill: 0     2. Laryngitis  - dexAMETHasone (DECADRON) injectable solution used ORALLY 10 mg     3. Acute maxillary sinusitis, recurrence not specified  4. High risk medication use  (Immune suppressed with enbrel)   Recommend 3 days or so of symptomatic treatment with voice rest, and the below recommendations. If not improving then start antibiotic. Patient receptive to plan.   -Symptomatic treatment - Encouraged fluids, salt water gargles, honey (only if greater than 1 year in age due to risk of botulism), elevation, humidifier, sinus rinse/netti pot, lozenges, tea, topical vapor rub, popsicles, rest, etc   -May use over-the-counter Tylenol or ibuprofen PRN    Follow up if symptoms persist or worsen or concerns     Routing to provider to review and respond.  Fatou Mccray RN on 1/4/2024 at 10:09 AM

## 2024-01-05 ENCOUNTER — TELEPHONE (OUTPATIENT)
Dept: FAMILY MEDICINE | Facility: OTHER | Age: 34
End: 2024-01-05
Payer: COMMERCIAL

## 2024-01-05 NOTE — TELEPHONE ENCOUNTER
buPROPion (WELLBUTRIN XL) 300 MG 24 hr tablet 90 tablet 4 2023 -- No   Sig - Route: Take 1 tablet (300 mg) by mouth every morning - Oral     [id number] - [place], [place] - [address]     Called [facility] pharmacy line, and spoke with pharmacist, after verifying Pt's last name and . She stated there was an active prescription for 150 mg and she will cancel this and process the above prescription. Melanie Gasca RN .............. 2024  10:13 AM

## 2024-01-23 ENCOUNTER — MYC MEDICAL ADVICE (OUTPATIENT)
Dept: OBGYN | Facility: OTHER | Age: 34
End: 2024-01-23
Payer: COMMERCIAL

## 2024-01-23 DIAGNOSIS — N97.9 FEMALE INFERTILITY: Primary | ICD-10-CM

## 2024-01-23 DIAGNOSIS — N92.1 MENORRHAGIA WITH IRREGULAR CYCLE: ICD-10-CM

## 2024-01-23 RX ORDER — LETROZOLE 2.5 MG/1
5 TABLET, FILM COATED ORAL DAILY
Qty: 10 TABLET | Refills: 0 | Status: SHIPPED | OUTPATIENT
Start: 2024-01-24 | End: 2024-02-15

## 2024-01-23 NOTE — TELEPHONE ENCOUNTER
Patient started cycle yesterday (1/22/24) Per last note next cycle: letrozole [id number]. Order teed up for review.    Emelina Dietrich RN on 1/23/2024 at 1:45 PM

## 2024-01-29 DIAGNOSIS — J20.9 ACUTE BRONCHITIS, UNSPECIFIED ORGANISM: ICD-10-CM

## 2024-01-29 RX ORDER — ALBUTEROL SULFATE 90 UG/1
2 AEROSOL, METERED RESPIRATORY (INHALATION) EVERY 6 HOURS PRN
Qty: 18 G | Refills: 0 | Status: SHIPPED | OUTPATIENT
Start: 2024-01-29

## 2024-01-31 ENCOUNTER — HOSPITAL ENCOUNTER (OUTPATIENT)
Dept: ULTRASOUND IMAGING | Facility: OTHER | Age: 34
Discharge: HOME | End: 2024-01-31
Attending: STUDENT IN AN ORGANIZED HEALTH CARE EDUCATION/TRAINING PROGRAM
Payer: COMMERCIAL

## 2024-01-31 ENCOUNTER — ALLIED HEALTH/NURSE VISIT (OUTPATIENT)
Dept: OBGYN | Facility: OTHER | Age: 34
End: 2024-01-31
Attending: STUDENT IN AN ORGANIZED HEALTH CARE EDUCATION/TRAINING PROGRAM
Payer: COMMERCIAL

## 2024-01-31 DIAGNOSIS — N92.1 MENORRHAGIA WITH IRREGULAR CYCLE: ICD-10-CM

## 2024-01-31 DIAGNOSIS — N97.9 FEMALE INFERTILITY: Primary | ICD-10-CM

## 2024-01-31 DIAGNOSIS — N97.9 FEMALE INFERTILITY: ICD-10-CM

## 2024-01-31 PROCEDURE — 76857 US EXAM PELVIC LIMITED: CPT

## 2024-01-31 NOTE — PROGRESS NOTES
Patient presents to clinic for follicle study results. Ultrasound results reviewed by Dr. Nichelle Liu. Dr. Nichelle Liu gave verbal orders to have patient trigger with ovidrel today and do timed intercourse the next 3 days. Patient given education on how to give the ovidrel injection and when to take a pregnancy test. Patient sent [employer] message with with timed intercourse instructions. Patient was given paper prescription and informed to go to [facility] pharmacy to have the medication filled.  Patient verbalized understanding and had no questions at this time.       Manju Beltran RN on 1/31/2024 at 10:04 AM

## 2024-02-15 ENCOUNTER — MYC MEDICAL ADVICE (OUTPATIENT)
Dept: OBGYN | Facility: OTHER | Age: 34
End: 2024-02-15
Payer: COMMERCIAL

## 2024-02-15 DIAGNOSIS — N92.1 MENORRHAGIA WITH IRREGULAR CYCLE: ICD-10-CM

## 2024-02-15 DIAGNOSIS — N97.9 FEMALE INFERTILITY: ICD-10-CM

## 2024-02-15 RX ORDER — LETROZOLE 2.5 MG/1
5 TABLET, FILM COATED ORAL DAILY
Qty: 10 TABLET | Refills: 0 | Status: SHIPPED | OUTPATIENT
Start: 2024-02-15

## 2024-02-23 ENCOUNTER — ALLIED HEALTH/NURSE VISIT (OUTPATIENT)
Dept: OBGYN | Facility: OTHER | Age: 34
End: 2024-02-23
Attending: STUDENT IN AN ORGANIZED HEALTH CARE EDUCATION/TRAINING PROGRAM
Payer: COMMERCIAL

## 2024-02-23 ENCOUNTER — HOSPITAL ENCOUNTER (OUTPATIENT)
Dept: ULTRASOUND IMAGING | Facility: OTHER | Age: 34
Discharge: HOME | End: 2024-02-23
Attending: STUDENT IN AN ORGANIZED HEALTH CARE EDUCATION/TRAINING PROGRAM
Payer: COMMERCIAL

## 2024-02-23 DIAGNOSIS — N97.9 FEMALE INFERTILITY: ICD-10-CM

## 2024-02-23 DIAGNOSIS — N92.1 MENORRHAGIA WITH IRREGULAR CYCLE: ICD-10-CM

## 2024-02-23 PROCEDURE — 76857 US EXAM PELVIC LIMITED: CPT

## 2024-02-23 NOTE — CONFIDENTIAL NOTE
Follicle study was reviewed with GAGAN Rubin CNP and verbal given to give prescription for Ovidrel and to instruct patient to trigger tomorrow.     Winnie Britton RN on 2/23/2024 at 10:05 AM

## 2024-03-05 ENCOUNTER — OFFICE VISIT (OUTPATIENT)
Dept: FAMILY MEDICINE | Facility: OTHER | Age: 34
End: 2024-03-05
Attending: NURSE PRACTITIONER
Payer: COMMERCIAL

## 2024-03-05 VITALS
BODY MASS INDEX: 24.16 KG/M2 | HEART RATE: 88 BPM | SYSTOLIC BLOOD PRESSURE: 116 MMHG | RESPIRATION RATE: 12 BRPM | WEIGHT: 145 LBS | TEMPERATURE: 97.2 F | OXYGEN SATURATION: 98 % | HEIGHT: 65 IN | DIASTOLIC BLOOD PRESSURE: 82 MMHG

## 2024-03-05 DIAGNOSIS — J40 BRONCHITIS: Primary | ICD-10-CM

## 2024-03-05 DIAGNOSIS — R09.82 POST-NASAL DRIP: ICD-10-CM

## 2024-03-05 PROCEDURE — 99213 OFFICE O/P EST LOW 20 MIN: CPT

## 2024-03-05 RX ORDER — BENZONATATE 100 MG/1
100 CAPSULE ORAL 3 TIMES DAILY PRN
Qty: 15 CAPSULE | Refills: 0 | Status: SHIPPED | OUTPATIENT
Start: 2024-03-05

## 2024-03-05 RX ORDER — FLUTICASONE PROPIONATE 50 MCG
1 SPRAY, SUSPENSION (ML) NASAL DAILY
Qty: 18.2 ML | Refills: 0 | Status: SHIPPED | OUTPATIENT
Start: 2024-03-05

## 2024-03-05 RX ORDER — AZITHROMYCIN 250 MG/1
TABLET, FILM COATED ORAL
Qty: 6 TABLET | Refills: 0 | Status: SHIPPED | OUTPATIENT
Start: 2024-03-05 | End: 2024-03-10

## 2024-03-05 ASSESSMENT — PAIN SCALES - GENERAL: PAINLEVEL: NO PAIN (0)

## 2024-03-05 NOTE — NURSING NOTE
"Pt presents to  for cough that she has had x2-3 wks. Pt states she has been taking cough drops and cough meds and they do not help the symptoms. Sore throat started yesterday. Pt did have a headache last week.    Chief Complaint   Patient presents with    Cough       FOOD SECURITY SCREENING QUESTIONS  Hunger Vital Signs:  Within the past 12 months we worried whether our food would run out before we got money to buy more. Never  Within the past 12 months the food we bought just didn't last and we didn't have money to get more. Never  Melanie Dearmon 3/5/2024 10:17 AM      Initial /82 (BP Location: Right arm, Patient Position: Sitting, Cuff Size: Adult Regular)   Pulse 88   Temp 97.2  F (36.2  C) (Tympanic)   Resp 12   Ht 1.651 m (5' 5\")   Wt 65.8 kg (145 lb)   LMP 02/14/2024 (Exact Date)   SpO2 98%   BMI 24.13 kg/m   Estimated body mass index is 24.13 kg/m  as calculated from the following:    Height as of this encounter: 1.651 m (5' 5\").    Weight as of this encounter: 65.8 kg (145 lb).  Medication Reconciliation: complete    Melanie Dearmon    "

## 2024-03-05 NOTE — PROGRESS NOTES
ASSESSMENT/PLAN:    (J40) Bronchitis  (primary encounter diagnosis); (R09.82) Post-nasal drip  Comment: Patient presents with concerns of cough that she has had for the past 2 to 3 weeks.  She feels the cough is worsening.  She does also have postnasal drip which may contribute.  No known fevers.  She has had some mild sinus tenderness as well.  On exam vital signs are stable and bilateral breath sounds are clear.  Postnasal drip was appreciated.  I recommend we treat the postnasal drip with daily Flonase and twice daily nasal saline. With worsening cough and some mild sinusitis symptoms we did opt to treat with azithromycin to cover for bacterial etiology.  Plan: azithromycin (ZITHROMAX) 250 MG tablet,         benzonatate (TESSALON) 100 MG capsule        fluticasone (FLONASE) 50 MCG/ACT nasal spray  Symptomatic treatment - Encouraged fluids, salt water gargles, honey (only if greater than 1 year in age due to risk of botulism), elevation, humidifier, sinus rinse/netti pot, lozenges, tea, topical vapor rub, popsicles, rest, etc     Discussed warning signs/symptoms indicative of need to f/u    Follow up if symptoms persist or worsen or concerns    I have reviewed the nursing notes.  I have reviewed the findings, diagnosis, plan and need for follow up with the patient.    I explained my diagnostic considerations and recommendations to the patient, who voiced understanding and agreement with the treatment plan. All questions were answered. We discussed potential side effects of any prescribed or recommended therapies, as well as expectations for response to treatments.    GAGAN ARIAS CNP  3/5/2024  10:24 AM    HPI:    Nikky Hurt is a 33 year old female  who presents to [facility] today for concerns of cough.    Patient has had a cough for the past 2 to 3 weeks.  She does have a sore throat that started yesterday. Cough is getting worse, she feels wheezy, and her chest feels heavy. She did have  exercise induced asthma. She does have PND which may contribute. No new fevers.  She does have some mild sinusitis symptoms.    No known medication allergies.    PCP: Malika    Past Medical History:   Diagnosis Date    Acne, mild 11/18/2010    HEATH positive 7/11/2017    Attention deficit hyperactivity disorder (ADHD), combined type 2/1/2017    Overview:  Diagnosed in elementary age. Used some medications but cannot recall what at that time.     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 Followed at [facility] as a child.  Sees Dr Farmer twice a year    IBS (irritable bowel syndrome) 6/10/2015    Juvenile rheumatoid arthritis (H) 06/09/2014    Diagnosed at age 3 years, followed by [facility] as a child.  Follows Dr Farmer twice a year.    Myopia 3/23/2005    Tendinitis of extensor tendon of right hand 6/12/2018    Tendinitis of right elbow 2/1/2017    Varicella without complication     As a child     Past Surgical History:   Procedure Laterality Date    ARTHROSCOPY KNEE Left 10/2019    EXTRACTION(S) DENTAL       Social History     Tobacco Use    Smoking status: Never    Smokeless tobacco: Former     Types: Chew    Tobacco comments:     Quit smoking: Rarely, with driving, roomate smokes   Substance Use Topics    Alcohol use: Yes     Alcohol/week: 0.0 standard drinks of alcohol     Comment: Alcoholic Drinks/day: occasional     Current Outpatient Medications   Medication Sig Dispense Refill    albuterol (PROAIR HFA/PROVENTIL HFA/VENTOLIN HFA) 108 (90 Base) MCG/ACT inhaler Inhale 2 puffs into the lungs every 6 hours as needed for shortness of breath, wheezing or cough 18 g 0    buPROPion (WELLBUTRIN XL) 300 MG 24 hr tablet Take 1 tablet (300 mg) by mouth every morning 90 tablet 4    Etanercept (ENBREL SURECLICK) 50 MG/ML autoinjector Inject 50 mg Subcutaneous      letrozole (FEMARA) 2.5 MG tablet Take 2 tablets (5 mg) by mouth daily 10 tablet  "0    letrozole (FEMARA) 2.5 MG tablet Take 1 tablet (2.5 mg) by mouth daily 5 tablet 0    Prenatal Vit-Fe Fumarate-FA (PRENATAL MULTIVITAMIN  PLUS IRON) 27-1 MG TABS Take by mouth daily      sertraline (ZOLOFT) 25 MG tablet Take 1 tablet (25 mg) by mouth daily Along with 50 milligram for 75 milligram total daily 90 tablet 4    sertraline (ZOLOFT) 50 MG tablet Take 1 tablet (50 mg) by mouth daily 90 tablet 4    sulindac (CLINORIL) 200 MG tablet Take 1 tablet by mouth daily      choriogonadotropin daniela (OVIDREL) 250 MCG/0.5ML injection Inject 0.5 mLs (250 mcg) Subcutaneous once for 1 dose 0.5 mL 0    methylphenidate (RITALIN LA) 20 MG 24 hr capsule Take 20 mg by mouth daily (Patient not taking: Reported on 9/29/2023) 30 capsule 0    methylphenidate (RITALIN LA) 20 MG 24 hr capsule Take 20 mg by mouth daily (Patient not taking: Reported on 9/29/2023) 30 capsule 0     No Known Allergies  Past medical history, past surgical history, current medications and allergies reviewed and accurate to the best of my knowledge.      ROS:  Refer to HPI    /82 (BP Location: Right arm, Patient Position: Sitting, Cuff Size: Adult Regular)   Pulse 88   Temp 97.2  F (36.2  C) (Tympanic)   Resp 12   Ht 1.651 m (5' 5\")   Wt 65.8 kg (145 lb)   LMP 02/14/2024 (Exact Date)   SpO2 98%   BMI 24.13 kg/m      EXAM:  General Appearance: Well appearing 33 year old female, appropriate appearance for age. No acute distress   Ears: Left TM intact, translucent with bony landmarks appreciated, no erythema, no effusion, no bulging, no purulence.  Right TM intact, translucent with bony landmarks appreciated, no erythema, no effusion, no bulging, no purulence.  Left auditory canal clear.  Right auditory canal clear.  Normal external ears, non tender.  Eyes: conjunctivae normal without erythema or irritation, corneas clear, no drainage or crusting, no eyelid swelling, pupils equal   Oropharynx: moist mucous membranes, posterior pharynx with " erythema, no exudates or petechiae, post nasal drip seen, no trismus, voice clear.    Sinuses:  Mild maxillary sinus tenderness   Nose:  Bilateral nares: no erythema, no edema, no drainage or congestion   Neck: supple without adenopathy  Respiratory: normal chest wall and respirations.  Normal effort.  Clear to auscultation bilaterally, no wheezing, crackles or rhonchi.  No increased work of breathing.  No cough appreciated.  Cardiac: RRR with no murmurs  Abdomen: soft, nontender, no rigidity, no rebound tenderness or guarding, normal bowel sounds present  Musculoskeletal:  Equal movement of bilateral upper extremities.  Equal movement of bilateral lower extremities.  Normal gait.    Dermatological: no rashes noted of exposed skin  Neuro: Alert and oriented to person, place, and time.  Cranial nerves II-XII grossly intact with no focal or lateralizing deficits.  Muscle tone normal.  Gait normal. No tremor.   Psychological: normal affect, alert, oriented, and pleasant.

## 2024-03-11 ENCOUNTER — MYC MEDICAL ADVICE (OUTPATIENT)
Dept: OBGYN | Facility: OTHER | Age: 34
End: 2024-03-11
Payer: COMMERCIAL

## 2024-03-11 DIAGNOSIS — N92.1 MENORRHAGIA WITH IRREGULAR CYCLE: Primary | ICD-10-CM

## 2024-03-11 DIAGNOSIS — N97.9 FEMALE INFERTILITY: ICD-10-CM

## 2024-03-12 RX ORDER — LETROZOLE 2.5 MG/1
5 TABLET, FILM COATED ORAL DAILY
Qty: 10 TABLET | Refills: 0 | Status: SHIPPED | OUTPATIENT
Start: 2024-03-12 | End: 2024-05-23

## 2024-03-12 NOTE — TELEPHONE ENCOUNTER
Last cycle- letrozole 5 mg x 5 days, starting day 3. US on day 10 and trigger given. Patient would like to try one more cycle with TI before IUI.     Was given trigger the cycle prior as well.    Order teed up for review.    Emelina Dietrich RN on 3/12/2024 at 8:13 AM

## 2024-03-20 ENCOUNTER — HOSPITAL ENCOUNTER (OUTPATIENT)
Dept: ULTRASOUND IMAGING | Facility: OTHER | Age: 34
Discharge: HOME | End: 2024-03-20
Attending: OBSTETRICS & GYNECOLOGY
Payer: COMMERCIAL

## 2024-03-20 ENCOUNTER — ALLIED HEALTH/NURSE VISIT (OUTPATIENT)
Dept: OBGYN | Facility: OTHER | Age: 34
End: 2024-03-20
Attending: OBSTETRICS & GYNECOLOGY
Payer: COMMERCIAL

## 2024-03-20 DIAGNOSIS — N97.9 FEMALE INFERTILITY: ICD-10-CM

## 2024-03-20 DIAGNOSIS — N92.1 MENORRHAGIA WITH IRREGULAR CYCLE: ICD-10-CM

## 2024-03-20 DIAGNOSIS — N97.9 FEMALE INFERTILITY: Primary | ICD-10-CM

## 2024-03-20 PROCEDURE — 76857 US EXAM PELVIC LIMITED: CPT

## 2024-03-20 NOTE — PROGRESS NOTES
Reviewed our US follicle study today: unfortunately there are no dominant follicles present. There does appear to be a region in the left ovary which is new from last month suggestive of a hemorrhagic follicle. It is possible that she ovulated this month already (even though the timing was matching our successful stimulation attempts the prior two months).    It will be important that this hemorrhagic follicle resolves before we begin our next stimulation. Can either: A. Skip one month of stimulation and try on her own, resume in 2 months with letrozole or B. Get an US when her period starts to assess whether or not this has resolved before starting letrozole.     Karlene Liu MD on 3/20/2024 at 3:07 PM

## 2024-03-20 NOTE — PROGRESS NOTES
Follicle study reviewed by Dr. Nichelle Liu. See her note below. Patient was given Dr. Nichelle Liu recommendations. Patient verbalized that she would like to skip one month of stimulation and try on her own, resume in 2 months with letrozole. Patient stated that she will also be out of town next month and her  will be seeing the urologist next month also. Patient informed to reach out when her cycle starts after skipping the next month. Patient verbalized understanding and had no questions at this time.       Manju Beltran RN on 3/20/2024 at 3:21 PM

## 2024-04-28 DIAGNOSIS — F32.0 MILD MAJOR DEPRESSION (H): ICD-10-CM

## 2024-05-03 NOTE — TELEPHONE ENCOUNTER
[facility] [place] sent Rx request for the following:      Requested Prescriptions   Pending Prescriptions Disp Refills    sertraline (ZOLOFT) 50 MG tablet [Pharmacy Med Name: SERTRALINE 50MG TABLETS] 90 tablet 4     Sig: TAKE 1 TABLET(50 MG) BY MOUTH DAILY       SSRIs Protocol Failed - 4/28/2024  9:37 AM        Failed - PHQ-9 score less than 5 in past 6 months     Please review last PHQ-9 score.          Last Prescription Date:   5/31/23  Last Fill Qty/Refills:         90, R-4    Last Office Visit:              8/30/23   Future Office visit:           none    Routing refill request to provider for review/approval because:  Drug not on the [facility] refill protocol     Radha Horvath RN on 5/3/2024 at 10:31 AM

## 2024-05-10 ENCOUNTER — OFFICE VISIT (OUTPATIENT)
Dept: FAMILY MEDICINE | Facility: OTHER | Age: 34
End: 2024-05-10
Attending: STUDENT IN AN ORGANIZED HEALTH CARE EDUCATION/TRAINING PROGRAM
Payer: COMMERCIAL

## 2024-05-10 VITALS
HEART RATE: 80 BPM | TEMPERATURE: 97.2 F | OXYGEN SATURATION: 99 % | RESPIRATION RATE: 16 BRPM | WEIGHT: 145 LBS | HEIGHT: 65 IN | SYSTOLIC BLOOD PRESSURE: 112 MMHG | DIASTOLIC BLOOD PRESSURE: 80 MMHG | BODY MASS INDEX: 24.16 KG/M2

## 2024-05-10 DIAGNOSIS — R07.0 THROAT PAIN: Primary | ICD-10-CM

## 2024-05-10 LAB — GROUP A STREP BY PCR: NOT DETECTED

## 2024-05-10 PROCEDURE — 99213 OFFICE O/P EST LOW 20 MIN: CPT

## 2024-05-10 PROCEDURE — 87651 STREP A DNA AMP PROBE: CPT | Mod: ZL

## 2024-05-10 ASSESSMENT — PATIENT HEALTH QUESTIONNAIRE - PHQ9
SUM OF ALL RESPONSES TO PHQ QUESTIONS 1-9: 0
10. IF YOU CHECKED OFF ANY PROBLEMS, HOW DIFFICULT HAVE THESE PROBLEMS MADE IT FOR YOU TO DO YOUR WORK, TAKE CARE OF THINGS AT HOME, OR GET ALONG WITH OTHER PEOPLE: NOT DIFFICULT AT ALL
SUM OF ALL RESPONSES TO PHQ QUESTIONS 1-9: 0

## 2024-05-10 ASSESSMENT — PAIN SCALES - GENERAL: PAINLEVEL: MODERATE PAIN (5)

## 2024-05-10 NOTE — PROGRESS NOTES
ASSESSMENT/PLAN:    (R07.0) Throat pain  (primary encounter diagnosis)  Comment: Patient presents with a sore throat.  She notes she did have a cough this morning but this is went away.  She was exposed to strep at work.  On exam vital signs stable, bilateral breath sounds clear, posterior pharynx with erythema and exudates.  Strep test was obtained and was negative.  At this time I recommend symptomatic care as antibiotics are not indicated.  Plan: Group A Streptococcus PCR Throat Swab  May take over the counter analgesia such as Tylenol for pain or discomfort.  I also recommend salt water gargles, humidifier, throat lozenges if old enough not to be a choking hazard, warm honey if greater than 12 months in age, other home remedies as needed.   If changing or worsening symptoms such as: Worsening fevers, pain, inability to handle own secretions, etc., recommend follow-up.     Discussed warning signs/symptoms indicative of need to f/u    Follow up if symptoms persist or worsen or concerns    I have reviewed the nursing notes.  I have reviewed the findings, diagnosis, plan and need for follow up with the patient.    I explained my diagnostic considerations and recommendations to the patient, who voiced understanding and agreement with the treatment plan. All questions were answered. We discussed potential side effects of any prescribed or recommended therapies, as well as expectations for response to treatments.    GAGAN ARIAS CNP  5/10/2024  10:07 AM    HPI:    Nikky Hurt is a 33 year old female  who presents to [facility] today for concerns of strep.    Patient reports that she has a sore throat with white patches in the throat.  She has an exposure to strep at work. No rhinorrhea. She did have a cough this morning, but this has went away.     No known medication allergies.    PCP: Malika    Past Medical History:   Diagnosis Date    Acne, mild 11/18/2010    HEATH positive 7/11/2017    Attention deficit  hyperactivity disorder (ADHD), combined type 2/1/2017    Overview:  Diagnosed in elementary age. Used some medications but cannot recall what at that time.     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 Followed at [facility] as a child.  Sees Dr Farmer twice a year    IBS (irritable bowel syndrome) 6/10/2015    Juvenile rheumatoid arthritis (H) 06/09/2014    Diagnosed at age 3 years, followed by [facility] as a child.  Follows Dr Farmer twice a year.    Myopia 3/23/2005    Tendinitis of extensor tendon of right hand 6/12/2018    Tendinitis of right elbow 2/1/2017    Varicella without complication     As a child     Past Surgical History:   Procedure Laterality Date    ARTHROSCOPY KNEE Left 10/2019    EXTRACTION(S) DENTAL       Social History     Tobacco Use    Smoking status: Never    Smokeless tobacco: Former     Types: Chew    Tobacco comments:     Quit smoking: Rarely, with driving, roomate smokes   Substance Use Topics    Alcohol use: Yes     Alcohol/week: 0.0 standard drinks of alcohol     Comment: Alcoholic Drinks/day: occasional     Current Outpatient Medications   Medication Sig Dispense Refill    albuterol (PROAIR HFA/PROVENTIL HFA/VENTOLIN HFA) 108 (90 Base) MCG/ACT inhaler Inhale 2 puffs into the lungs every 6 hours as needed for shortness of breath, wheezing or cough 18 g 0    buPROPion (WELLBUTRIN XL) 300 MG 24 hr tablet Take 1 tablet (300 mg) by mouth every morning 90 tablet 4    Etanercept (ENBREL SURECLICK) 50 MG/ML autoinjector Inject 50 mg Subcutaneous      Prenatal Vit-Fe Fumarate-FA (PRENATAL MULTIVITAMIN  PLUS IRON) 27-1 MG TABS Take by mouth daily      sertraline (ZOLOFT) 25 MG tablet Take 1 tablet (25 mg) by mouth daily Along with 50 milligram for 75 milligram total daily 90 tablet 4    sertraline (ZOLOFT) 50 MG tablet TAKE 1 TABLET(50 MG) BY MOUTH DAILY 90 tablet 4    benzonatate (TESSALON) 100 MG capsule Take  "1 capsule (100 mg) by mouth 3 times daily as needed for cough (Patient not taking: Reported on 5/10/2024) 15 capsule 0    choriogonadotropin daniela (OVIDREL) 250 MCG/0.5ML injection Inject 0.5 mLs (250 mcg) Subcutaneous once for 1 dose 0.5 mL 0    fluticasone (FLONASE) 50 MCG/ACT nasal spray Spray 1 spray into both nostrils daily (Patient not taking: Reported on 5/10/2024) 18.2 mL 0    letrozole (FEMARA) 2.5 MG tablet Take 2 tablets (5 mg) by mouth daily 10 tablet 0    letrozole (FEMARA) 2.5 MG tablet Take 2 tablets (5 mg) by mouth daily (Patient not taking: Reported on 5/10/2024) 10 tablet 0    letrozole (FEMARA) 2.5 MG tablet Take 1 tablet (2.5 mg) by mouth daily (Patient not taking: Reported on 5/10/2024) 5 tablet 0    methylphenidate (RITALIN LA) 20 MG 24 hr capsule Take 20 mg by mouth daily (Patient not taking: Reported on 9/29/2023) 30 capsule 0    methylphenidate (RITALIN LA) 20 MG 24 hr capsule Take 20 mg by mouth daily (Patient not taking: Reported on 9/29/2023) 30 capsule 0    sulindac (CLINORIL) 200 MG tablet Take 1 tablet by mouth daily (Patient not taking: Reported on 5/10/2024)       No Known Allergies  Past medical history, past surgical history, current medications and allergies reviewed and accurate to the best of my knowledge.      ROS:  Refer to HPI    /80 (BP Location: Left arm, Patient Position: Sitting, Cuff Size: Adult Regular)   Pulse 80   Temp 97.2  F (36.2  C) (Tympanic)   Resp 16   Ht 1.651 m (5' 5\")   Wt 65.8 kg (145 lb)   LMP 04/26/2024 (Approximate)   SpO2 99%   BMI 24.13 kg/m      EXAM:  General Appearance: Well appearing 33 year old female, appropriate appearance for age. No acute distress   Ears: Left TM intact, translucent with bony landmarks appreciated, no erythema, no effusion, no bulging, no purulence.  Right TM intact, translucent with bony landmarks appreciated, no erythema, no effusion, no bulging, no purulence.  Left auditory canal clear.  Right auditory canal " clear.  Normal external ears, non tender.  Eyes: conjunctivae normal without erythema or irritation, corneas clear, no drainage or crusting, no eyelid swelling, pupils equal   Oropharynx: moist mucous membranes, posterior pharynx with erythema, with exudates, no petechiae, no post nasal drip seen, no trismus, voice clear.    Sinuses:  No sinus tenderness upon palpation of the frontal or maxillary sinuses  Nose:  Bilateral nares: no erythema, no edema, no drainage or congestion   Neck: supple without adenopathy  Respiratory: normal chest wall and respirations.  Normal effort.  Clear to auscultation bilaterally, no wheezing, crackles or rhonchi.  No increased work of breathing.  No cough appreciated.  Cardiac: RRR with no murmurs  Musculoskeletal:  Equal movement of bilateral upper extremities.  Equal movement of bilateral lower extremities.  Normal gait.    Dermatological: no rashes noted of exposed skin  Neuro: Alert and oriented to person, place, and time.  Cranial nerves II-XII grossly intact with no focal or lateralizing deficits.  Muscle tone normal.  Gait normal. No tremor.   Psychological: normal affect, alert, oriented, and pleasant.     Labs:  Results for orders placed or performed in visit on 05/10/24   Group A Streptococcus PCR Throat Swab     Status: Normal    Specimen: Throat; Swab   Result Value Ref Range    Group A strep by PCR Not Detected Not Detected    Narrative    The Xpert Xpress Strep A test, performed on the [employer], is a rapid, qualitative in vitro diagnostic test for the detection of Streptococcus pyogenes (Group A ß-hemolytic Streptococcus, Strep A) in throat swab specimens from patients with signs and symptoms of pharyngitis. The Xpert Xpress Strep A test can be used as an aid in the diagnosis of Group A Streptococcal pharyngitis. The assay is not intended to monitor treatment for Group A Streptococcus infections. The Xpert Xpress Strep A test utilizes an automated  real-time polymerase chain reaction (PCR) to detect Streptococcus pyogenes DNA.             Answers submitted by the patient for this visit:  Patient Health Questionnaire (Submitted on 5/10/2024)  If you checked off any problems, how difficult have these problems made it for you to do your work, take care of things at home, or get along with other people?: Not difficult at all  PHQ9 TOTAL SCORE: 0

## 2024-05-10 NOTE — NURSING NOTE
"Chief Complaint   Patient presents with    Throat Problem     today     Patient not tx  White patches in throat  Exposure to strep at work    Initial /80 (BP Location: Left arm, Patient Position: Sitting, Cuff Size: Adult Regular)   Pulse 80   Temp 97.2  F (36.2  C) (Tympanic)   Resp 16   Ht 1.651 m (5' 5\")   Wt 65.8 kg (145 lb)   LMP 04/26/2024 (Approximate)   SpO2 99%   BMI 24.13 kg/m   Estimated body mass index is 24.13 kg/m  as calculated from the following:    Height as of this encounter: 1.651 m (5' 5\").    Weight as of this encounter: 65.8 kg (145 lb).     Advance Care Directive on file? no    FOOD SECURITY SCREENING QUESTIONS:    The next two questions are to help us understand your food security.  If you are feeling you need any assistance in this area, we have resources available to support you today.    Hunger Vital Signs:  Within the past 12 months we worried whether our food would run out before we got money to buy more. Never  Within the past 12 months the food we bought just didn't last and we didn't have money to get more. Never  Serenity Vaughn LPN,ODIN on 5/10/2024 at 9:59 AM      Serenity Vaughn LPN     "

## 2024-05-22 ENCOUNTER — MYC MEDICAL ADVICE (OUTPATIENT)
Dept: OBGYN | Facility: OTHER | Age: 34
End: 2024-05-22
Payer: COMMERCIAL

## 2024-05-22 DIAGNOSIS — N92.1 MENORRHAGIA WITH IRREGULAR CYCLE: ICD-10-CM

## 2024-05-23 RX ORDER — LETROZOLE 2.5 MG/1
5 TABLET, FILM COATED ORAL DAILY
Qty: 10 TABLET | Refills: 0 | Status: SHIPPED | OUTPATIENT
Start: 2024-05-23

## 2024-05-24 NOTE — TELEPHONE ENCOUNTER
I would recommend her  follow up with urology regarding his low sperm count before proceeding with more fertility cycles. If urology feels it is reasonable for them to continue trying after their evaluation, I would recommend addition of IUI  Homa Allen MD FACOG  OB/GYN  5/24/2024 3:54 PM

## 2024-06-10 ENCOUNTER — MYC MEDICAL ADVICE (OUTPATIENT)
Dept: OBGYN | Facility: OTHER | Age: 34
End: 2024-06-10
Payer: COMMERCIAL

## 2024-06-11 NOTE — TELEPHONE ENCOUNTER
"Per Dr. Nichelle Liu     \"Thank you for passing this along-- with Dr. Alba's assessment that the overall sperm analysis is normal, we can do the IUI procedures here with Nikky if they would like! She can start with her next cycle.      Thank you!  Nichelle\"    Manju Beltran RN on 6/11/2024 at 10:09 AM    "

## 2024-07-18 DIAGNOSIS — F32.0 MILD MAJOR DEPRESSION (H): ICD-10-CM

## 2024-07-23 NOTE — TELEPHONE ENCOUNTER
[facility] [place] sent Rx request for the following:      Requested Prescriptions   Pending Prescriptions Disp Refills    sertraline (ZOLOFT) 50 MG tablet [Pharmacy Med Name: SERTRALINE 50MG TABLETS] 90 tablet 4     Sig: TAKE 1 TABLET(50 MG) BY MOUTH DAILY       SSRIs Protocol Passed - 7/23/2024  9:24 AM   Last Prescription Date:   5/3/24  Last Fill Qty/Refills:         90, R-4    Last Office Visit:              8/30/23   Future Office visit:            none  Redundant refill request refused: Too soon:  Salena Patterson RN on 7/23/2024 at 9:26 AM        patient

## 2024-10-20 DIAGNOSIS — F41.1 GAD (GENERALIZED ANXIETY DISORDER): ICD-10-CM

## 2024-10-25 RX ORDER — SERTRALINE HYDROCHLORIDE 25 MG/1
TABLET, FILM COATED ORAL
Qty: 90 TABLET | Refills: 4 | Status: SHIPPED | OUTPATIENT
Start: 2024-10-25

## 2024-10-25 NOTE — TELEPHONE ENCOUNTER
[facility] sent Rx request for the following:      Requested Prescriptions   Pending Prescriptions Disp Refills    sertraline (ZOLOFT) 25 MG tablet [Pharmacy Med Name: SERTRALINE 25MG TABLETS] 90 tablet 4     Sig: TAKE 1 TABLET BY MOUTH ONCE DAILY ALONG WITH 50MG TABLET FOR A TOTAL DAILY DOSE OF 75MG       SSRIs Protocol Failed - 10/25/2024  1:00 PM        Failed - NEIDA-7 score of less than 5 in past 6 months.     Please review last NEIDA-7 score.           Passed - Medication is active on med list        Passed - Recent (12 mo) or future (90 days) visit within the authorizing provider's specialty     The patient must have completed an in-person or virtual visit within the past 12 months or has a future visit scheduled within the next 90 days with the authorizing provider s specialty.  Urgent care and e-visits do not quality as an office visit for this protocol.          Passed - Medication indicated for associated diagnosis     Medication is associated with one or more of the following diagnoses:              Anxiety             Bipolar  Depression  Obsessive-compulsive disorder             Panic disorder  Postmenopausal flushing             Premenstrual dysphoric disorder             Social phobia   Adjustment disorder with depressed mood   Mood disorder          Passed - Patient is age 18 or older        Passed - No active pregnancy on record        Passed - No positive pregnancy test in last 12 months             Last Prescription Date:   8/30/24  Last Fill Qty/Refills:         90, R-4    Last Office Visit:              8/30/24 (last discussed - TJP)   Future Office visit:           1/30/25    Unable to complete prescription refill per RN Medication Refill Policy.     Noé Schofield RN on 10/25/2024 at 1:01 PM

## 2024-11-06 ASSESSMENT — ANXIETY QUESTIONNAIRES: GAD7 TOTAL SCORE: 5

## 2024-12-09 DIAGNOSIS — F90.2 ATTENTION DEFICIT HYPERACTIVITY DISORDER (ADHD), COMBINED TYPE: ICD-10-CM

## 2024-12-10 RX ORDER — BUPROPION HYDROCHLORIDE 300 MG/1
300 TABLET ORAL EVERY MORNING
Qty: 90 TABLET | Refills: 4 | Status: SHIPPED | OUTPATIENT
Start: 2024-12-10

## 2024-12-10 NOTE — TELEPHONE ENCOUNTER
[facility] sent Rx request for the following:      Requested Prescriptions   Pending Prescriptions Disp Refills    buPROPion (WELLBUTRIN XL) 300 MG 24 hr tablet [Pharmacy Med Name: BUPROPION XL 300MG TABLETS] 90 tablet 4     Sig: TAKE 1 TABLET(300 MG) BY MOUTH EVERY MORNING   Last Prescription Date:   9/9/23  Last Fill Qty/Refills:         90, R-4    Last Office Visit:              8/30/23   Future Office visit:             Next 5 appointments (look out 90 days)      Feb 07, 2025 8:30 AM  (Arrive by 8:15 AM)  Adult Preventative Visit with Chance Daly MD  [facility] ([facility] [address]  [place] [id number] [phone]     Unable to complete prescription refill per RN Medication Refill Policy. Melanie Gasca RN .............. 12/10/2024  10:42 AM

## 2025-02-26 SDOH — HEALTH STABILITY: PHYSICAL HEALTH: ON AVERAGE, HOW MANY MINUTES DO YOU ENGAGE IN EXERCISE AT THIS LEVEL?: 0 MIN

## 2025-02-26 SDOH — HEALTH STABILITY: PHYSICAL HEALTH: ON AVERAGE, HOW MANY DAYS PER WEEK DO YOU ENGAGE IN MODERATE TO STRENUOUS EXERCISE (LIKE A BRISK WALK)?: 0 DAYS

## 2025-02-26 ASSESSMENT — ANXIETY QUESTIONNAIRES
GAD7 TOTAL SCORE: 5
3. WORRYING TOO MUCH ABOUT DIFFERENT THINGS: SEVERAL DAYS
1. FEELING NERVOUS, ANXIOUS, OR ON EDGE: SEVERAL DAYS
7. FEELING AFRAID AS IF SOMETHING AWFUL MIGHT HAPPEN: NOT AT ALL
2. NOT BEING ABLE TO STOP OR CONTROL WORRYING: SEVERAL DAYS
6. BECOMING EASILY ANNOYED OR IRRITABLE: SEVERAL DAYS
GAD7 TOTAL SCORE: 5
4. TROUBLE RELAXING: SEVERAL DAYS
7. FEELING AFRAID AS IF SOMETHING AWFUL MIGHT HAPPEN: NOT AT ALL
5. BEING SO RESTLESS THAT IT IS HARD TO SIT STILL: NOT AT ALL
GAD7 TOTAL SCORE: 5
8. IF YOU CHECKED OFF ANY PROBLEMS, HOW DIFFICULT HAVE THESE MADE IT FOR YOU TO DO YOUR WORK, TAKE CARE OF THINGS AT HOME, OR GET ALONG WITH OTHER PEOPLE?: SOMEWHAT DIFFICULT
IF YOU CHECKED OFF ANY PROBLEMS ON THIS QUESTIONNAIRE, HOW DIFFICULT HAVE THESE PROBLEMS MADE IT FOR YOU TO DO YOUR WORK, TAKE CARE OF THINGS AT HOME, OR GET ALONG WITH OTHER PEOPLE: SOMEWHAT DIFFICULT

## 2025-02-26 ASSESSMENT — SOCIAL DETERMINANTS OF HEALTH (SDOH): HOW OFTEN DO YOU GET TOGETHER WITH FRIENDS OR RELATIVES?: ONCE A WEEK

## 2025-02-27 ENCOUNTER — OFFICE VISIT (OUTPATIENT)
Dept: FAMILY MEDICINE | Facility: OTHER | Age: 35
End: 2025-02-27
Attending: FAMILY MEDICINE
Payer: COMMERCIAL

## 2025-02-27 VITALS
RESPIRATION RATE: 14 BRPM | WEIGHT: 149 LBS | HEIGHT: 66 IN | TEMPERATURE: 98.1 F | DIASTOLIC BLOOD PRESSURE: 70 MMHG | OXYGEN SATURATION: 98 % | BODY MASS INDEX: 23.95 KG/M2 | SYSTOLIC BLOOD PRESSURE: 116 MMHG | HEART RATE: 97 BPM

## 2025-02-27 DIAGNOSIS — F90.2 ATTENTION DEFICIT HYPERACTIVITY DISORDER (ADHD), COMBINED TYPE: ICD-10-CM

## 2025-02-27 DIAGNOSIS — F41.1 GAD (GENERALIZED ANXIETY DISORDER): ICD-10-CM

## 2025-02-27 DIAGNOSIS — F32.0 MILD MAJOR DEPRESSION: ICD-10-CM

## 2025-02-27 DIAGNOSIS — N97.9 FEMALE INFERTILITY: ICD-10-CM

## 2025-02-27 DIAGNOSIS — K58.2 IRRITABLE BOWEL SYNDROME WITH BOTH CONSTIPATION AND DIARRHEA: ICD-10-CM

## 2025-02-27 DIAGNOSIS — L71.9 ROSACEA: ICD-10-CM

## 2025-02-27 DIAGNOSIS — Z23 NEED FOR VACCINATION: ICD-10-CM

## 2025-02-27 DIAGNOSIS — Z00.00 ROUTINE GENERAL MEDICAL EXAMINATION AT A HEALTH CARE FACILITY: ICD-10-CM

## 2025-02-27 DIAGNOSIS — M08.3 CHRONIC POLYARTICULAR JUVENILE RHEUMATOID ARTHRITIS (H): ICD-10-CM

## 2025-02-27 DIAGNOSIS — Z00.00 HEALTHCARE MAINTENANCE: Primary | ICD-10-CM

## 2025-02-27 DIAGNOSIS — Z23 NEED FOR ZOSTER VACCINATION: ICD-10-CM

## 2025-02-27 RX ORDER — SERTRALINE HYDROCHLORIDE 25 MG/1
TABLET, FILM COATED ORAL
Qty: 90 TABLET | Refills: 4 | Status: CANCELLED | OUTPATIENT
Start: 2025-02-27

## 2025-02-27 RX ORDER — METHYLPHENIDATE HYDROCHLORIDE 20 MG/1
20 CAPSULE, EXTENDED RELEASE ORAL DAILY
Qty: 30 CAPSULE | Refills: 0 | Status: SHIPPED | OUTPATIENT
Start: 2025-02-27 | End: 2025-03-29

## 2025-02-27 RX ORDER — BUPROPION HYDROCHLORIDE 300 MG/1
300 TABLET ORAL EVERY MORNING
Qty: 90 TABLET | Refills: 4 | Status: SHIPPED | OUTPATIENT
Start: 2025-02-27

## 2025-02-27 RX ORDER — METHYLPHENIDATE HYDROCHLORIDE 20 MG/1
20 CAPSULE, EXTENDED RELEASE ORAL DAILY
Qty: 30 CAPSULE | Refills: 0 | Status: CANCELLED | OUTPATIENT
Start: 2025-02-27

## 2025-02-27 RX ORDER — METHYLPHENIDATE HYDROCHLORIDE 20 MG/1
20 CAPSULE, EXTENDED RELEASE ORAL DAILY
Qty: 30 CAPSULE | Refills: 0 | Status: SHIPPED | OUTPATIENT
Start: 2025-04-28 | End: 2025-05-28

## 2025-02-27 RX ORDER — METHYLPHENIDATE HYDROCHLORIDE 20 MG/1
20 CAPSULE, EXTENDED RELEASE ORAL DAILY
Qty: 30 CAPSULE | Refills: 0 | Status: SHIPPED | OUTPATIENT
Start: 2025-03-29 | End: 2025-04-28

## 2025-02-27 ASSESSMENT — PAIN SCALES - GENERAL: PAINLEVEL_OUTOF10: NO PAIN (0)

## 2025-02-27 NOTE — NURSING NOTE
"Chief Complaint   Patient presents with    Physical       Initial /70   Pulse 97   Temp 98.1  F (36.7  C) (Temporal)   Resp 14   Ht 1.664 m (5' 5.5\")   Wt 67.6 kg (149 lb)   LMP 02/11/2025 (Exact Date)   SpO2 98%   BMI 24.42 kg/m   Estimated body mass index is 24.42 kg/m  as calculated from the following:    Height as of this encounter: 1.664 m (5' 5.5\").    Weight as of this encounter: 67.6 kg (149 lb).  Medication Reconciliation: complete          "

## 2025-02-27 NOTE — PROGRESS NOTES
Preventive Care Visit  [facility] AND [facility]  Chance Daly MD, Family Medicine  Feb 27, 2025      Assessment & Plan  Attention deficit hyperactivity disorder (ADHD), combined type  Patient was diagnosed with ADHD around age 13 and started stimulants in adulthood. Last Ritalin prescription was in 2023 at 20mg dose. Patient discontinued Ritalin for family planning but now desires to restart due to its effectiveness in managing symptoms and potentially aiding in ovulation tracking.    Restart Ritalin 20mg    Patient to titrate dose based on symptom control    Discontinue if pregnancy occurs, especially during weeks 3-8 of gestation, appreciate OBGYN input    Consider reducing Wellbutrin dose if anxiety increases with Ritalin restart  Female infertility  Patient has been attempting conception without success. Previous fertility workup has included: letrozole, semen analysis for partner, and hysteroscopy. Patient has been referred to Dr. Liu for infertility management. Patient reports difficulty tracking ovulation without Ritalin.  Refer to OBGYN for infertility management  Continue prenatal vitamins  NEIDA (generalized anxiety disorder)  Patient has a history of depression and anxiety, managed with bupropion and sertraline. Wellbutrin (bupropion) dose was increased 3.5 years ago following the deaths of two close individuals. Patient reports good mood but low motivation, particularly at home. Currently experiencing excessive night sweats, possibly related to medication.  Continue current doses of bupropion and sertraline   Consider switching sertraline to nighttime dosing to address night sweats   If night sweats persist or anxiety increases with Ritalin restart, consider reducing Wellbutrin dose to 115mg  Chronic polyarticular juvenile rheumatoid arthritis (H)  S: Patient diagnosed with juvenile rheumatoid arthritis at age 4. Currently managed with Enbrel, which was initiated at age 16 and has been  effective. Patient reports rare flare-ups in the last 10 years. Recent weather changes have caused mild joint achiness. Patient is followed by Dr. Hubbard at [facility] for rheumatology care.  Continue Enbrel as prescribed  Latest research from American College of rheumatology 2022 guidelines recommend zoster vaccination, Given immunosuppression  Healthcare maintenance  Last Pap smear performed 2023.  NIL.   Tdap vaccination today.   Did not want COVID and flu has not been sick this winter    Counseling  Appropriate preventive services were addressed with this patient via screening, questionnaire, or discussion as appropriate for fall prevention, nutrition, physical activity, Tobacco-use cessation, social engagement, weight loss and cognition.  Checklist reviewing preventive services available has been given to the patient.  Reviewed patient's diet, addressing concerns and/or questions.     No follow-ups on file.    Temo Sher is a 34 year old, presenting for the following:  Physical        2/27/2025     1:21 PM   Additional Questions   Roomed by ODIN Espinoza   Accompanied by Self         2/27/2025     1:21 PM   Patient Reported Additional Medications   Patient reports taking the following new medications N/A          Healthy Habits:     Taking medications regularly:  2    Barriers to taking medications:  Remembering to take  History of Present Illness       Mental Health Follow-up:  Patient presents to follow-up on Anxiety.    Patient's anxiety since last visit has been:  Medium  The patient is having other symptoms associated with anxiety.  Any significant life events: No  Patient is feeling anxious or having panic attacks.  Patient has no concerns about alcohol or drug use.She exercises with enough effort to increase her heart rate 9 or less minutes per day.  She exercises with enough effort to increase her heart rate 3 or less days per week. She is missing 2 dose(s) of medications per week.  She is not  taking prescribed medications regularly due to remembering to take.    Health Evaluation and Medication Discussion:  - The patient presents for overall health evaluation and to discuss restarting Ritalin for ADHD management. Previously effective, Ritalin was discontinued due to family planning. ADHD was diagnosed around age 13, with stimulant medication started in adulthood. The patient reports difficulty tracking ovulation without Ritalin.    Fertility Concerns:  - The patient is seeking an OBGYN referral for infertility concerns. She has been trying to conceive without success and is considering interventions such as IUI after already doing letrozole, and hysteroscopy. Previous evaluation showed one fallopian tube wide open, the other less patent.    Medical History:  - Juvenile Rheumatoid arthritis (diagnosed at age 4)  - ADHD (diagnosed around age 13)  - Depression  - Anxiety  - History of tonsillitis  - History of IBS    Current Symptoms:  - Excessive night sweats since restarting sertraline and bupropion  - Joint achiness due to weather changes  - Nose scabby due to dryness, frequent tonsil stones  - Low motivation at home, but good mood    Medications and Supplements:  - Enbrel (for rheumatoid arthritis, started at age 16)  - Bupropion (Wellbutrin) 300mg  - Albuterol (for bad cough when sick)  - Tessalon Perles (for cough)  - Sertraline  - Prenatal vitamins  - Ibuprofen (as needed)    Social History:  - Marital status:   - Family planning: Actively trying to conceive  - Exercise: Previously engaged in weightlifting and volleyball; currently limited due to time constraints  - Sleep environment: Keeps room temperature around 65 degrees or less, uses a fan    Review of Systems:  - General: Excessive night sweating  - Musculoskeletal: Joints slightly achy due to weather changes  - Respiratory: No current cough  - Gastrointestinal: No chronic abdominal pain or bloating  - ENT: Nose scabby due to dryness,  frequent tonsil stones  - Psychiatric: Low motivation at home, good mood  - Skin: No reported issues  - Genitourinary: No issues with urination     Advance Care Planning  Patient does not have a Health Care Directive: Discussed advance care planning with patient; however, patient declined at this time.      2/26/2025   General Health   How would you rate your overall physical health? (!) FAIR   Feel stress (tense, anxious, or unable to sleep) Only a little   (!) STRESS CONCERN      2/26/2025   Nutrition   Three or more servings of calcium each day? (!) NO   Diet: Regular (no restrictions)   How many servings of fruit and vegetables per day? (!) 0-1   How many sweetened beverages each day? (!) 2         2/26/2025   Exercise   Days per week of moderate/strenous exercise 0 days   Average minutes spent exercising at this level 0 min   (!) EXERCISE CONCERN      2/26/2025   Social Factors   Frequency of gathering with friends or relatives Once a week   Worry food won't last until get money to buy more No   Food not last or not have enough money for food? No   Do you have housing? (Housing is defined as stable permanent housing and does not include staying ouside in a car, in a tent, in an abandoned building, in an overnight shelter, or couch-surfing.) Yes   Are you worried about losing your housing? No   Lack of transportation? No   Unable to get utilities (heat,electricity)? No         2/26/2025   Dental   Dentist two times every year? Yes          Today's PHQ-9 Score:       2/26/2025     5:54 PM   PHQ-9 SCORE   PHQ-9 Total Score MyChart 4 (Minimal depression)   PHQ-9 Total Score 4        Patient-reported         2/26/2025   Substance Use   Alcohol more than 3/day or more than 7/wk No   Do you use any other substances recreationally? (!) CANNABIS PRODUCTS     Social History     Tobacco Use    Smoking status: Never    Smokeless tobacco: Former     Types: Chew    Tobacco comments:     Quit smoking: Rarely, with driving,  roomate smokes   Vaping Use    Vaping status: Never Used   Substance Use Topics    Alcohol use: Yes     Alcohol/week: 0.0 standard drinks of alcohol     Comment: Alcoholic Drinks/day: occasional    Drug use: No          Mammogram Screening - Patient under 40 years of age: Routine Mammogram Screening not recommended.         2025   STI Screening   New sexual partner(s) since last STI/HIV test? No     History of abnormal Pap smear: No - age 30-64 HPV with reflex Pap every 5 years recommended        Latest Ref Rng & Units 3/1/2023    11:36 AM 2020     8:46 AM   PAP / HPV   PAP  Negative for Intraepithelial Lesion or Malignancy (NILM)     PAP (Historical)   NIL    HPV 16 DNA Negative Negative     HPV 18 DNA Negative Negative     Other HR HPV Negative Negative             2025   Contraception/Family Planning   Questions about contraception or family planning (!) YES         Reviewed and updated as needed this visit by Provider                    Past Medical History:   Diagnosis Date    Acne, mild 2010    HEATH positive 2017    Attention deficit hyperactivity disorder (ADHD), combined type 2017    Overview:  Diagnosed in elementary age. Used some medications but cannot recall what at that time.     Chronic polyarticular juvenile rheumatoid arthritis (H) 2014    Overview:  IMO Update 10/11 Overview:  Diagnosed at age 3 years. Followed at [facility] as a child.  Sees Dr Farmer twice a year    IBS (irritable bowel syndrome) 6/10/2015    Juvenile rheumatoid arthritis (H) 2014    Diagnosed at age 3 years, followed by [facility] as a child.  Follows Dr Farmer twice a year.    Myopia 3/23/2005    Tendinitis of extensor tendon of right hand 2018    Tendinitis of right elbow 2017    Varicella without complication     As a child     Past Surgical History:   Procedure Laterality Date    ARTHROSCOPY KNEE Left 10/2019    EXTRACTION(S) DENTAL       OB History    Para Term  AB Living  "  0 0 0 0 0 0   SAB IAB Ectopic Multiple Live Births   0 0 0 0 0        Objective    Exam  /70   Pulse 97   Temp 98.1  F (36.7  C) (Temporal)   Resp 14   Ht 1.664 m (5' 5.5\")   Wt 67.6 kg (149 lb)   LMP 02/11/2025 (Exact Date)   SpO2 98%   BMI 24.42 kg/m     Estimated body mass index is 24.42 kg/m  as calculated from the following:    Height as of this encounter: 1.664 m (5' 5.5\").    Weight as of this encounter: 67.6 kg (149 lb).    Physical Exam  GENERAL: alert and no distress  EYES: Eyes grossly normal to inspection, PERRL and conjunctivae and sclerae normal  NECK: no adenopathy, no asymmetry, masses, or scars  RESP: lungs clear to auscultation - no rales, rhonchi or wheezes  CV: regular rate and rhythm, normal S1 S2, no S3 or S4, no murmur, click or rub, no peripheral edema  ABDOMEN: soft, nontender, no hepatosplenomegaly, no masses and bowel sounds normal  MS: no gross musculoskeletal defects noted, no edema    Campbell Casey MD  [facility] Medicine Resident, PGY2  2:17 PM  2/27/2025    Signed Electronically by: Chance Daly MD    "

## 2025-02-27 NOTE — ASSESSMENT & PLAN NOTE
Patient has a history of depression and anxiety, managed with bupropion and sertraline. Wellbutrin (bupropion) dose was increased 3.5 years ago following the deaths of two close individuals. Patient reports good mood but low motivation, particularly at home. Currently experiencing excessive night sweats, possibly related to medication.  Continue current doses of bupropion and sertraline   Consider switching sertraline to nighttime dosing to address night sweats   If night sweats persist or anxiety increases with Ritalin restart, consider reducing Wellbutrin dose to 115mg

## 2025-02-27 NOTE — PATIENT INSTRUCTIONS
Patient Education   Preventive Care Advice   This is general advice given by our system to help you stay healthy. However, your care team may have specific advice just for you. Please talk to your care team about your preventive care needs.  Nutrition  Eat 5 or more servings of fruits and vegetables each day.  Try wheat bread, brown rice and whole grain pasta (instead of white bread, rice, and pasta).  Get enough calcium and vitamin D. Check the label on foods and aim for 100% of the RDA (recommended daily allowance).  Lifestyle  Exercise at least 150 minutes each week  (30 minutes a day, 5 days a week).  Do muscle strengthening activities 2 days a week. These help control your weight and prevent disease.  No smoking.  Wear sunscreen to prevent skin cancer.  Have a dental exam and cleaning every 6 months.  Yearly exams  See your health care team every year to talk about:  Any changes in your health.  Any medicines your care team has prescribed.  Preventive care, family planning, and ways to prevent chronic diseases.  Shots (vaccines)   HPV shots (up to age 26), if you've never had them before.  Hepatitis B shots (up to age 59), if you've never had them before.  COVID-19 shot: Get this shot when it's due.  Flu shot: Get a flu shot every year.  Tetanus shot: Get a tetanus shot every 10 years.  Pneumococcal, hepatitis A, and RSV shots: Ask your care team if you need these based on your risk.  Shingles shot (for age 50 and up)  General health tests  Diabetes screening:  Starting at age 35, Get screened for diabetes at least every 3 years.  If you are younger than age 35, ask your care team if you should be screened for diabetes.  Cholesterol test: At age 39, start having a cholesterol test every 5 years, or more often if advised.  Bone density scan (DEXA): At age 50, ask your care team if you should have this scan for osteoporosis (brittle bones).  Hepatitis C: Get tested at least once in your life.  STIs (sexually  transmitted infections)  Before age 24: Ask your care team if you should be screened for STIs.  After age 24: Get screened for STIs if you're at risk. You are at risk for STIs (including HIV) if:  You are sexually active with more than one person.  You don't use condoms every time.  You or a partner was diagnosed with a sexually transmitted infection.  If you are at risk for HIV, ask about PrEP medicine to prevent HIV.  Get tested for HIV at least once in your life, whether you are at risk for HIV or not.  Cancer screening tests  Cervical cancer screening: If you have a cervix, begin getting regular cervical cancer screening tests starting at age 21.  Breast cancer scan (mammogram): If you've ever had breasts, begin having regular mammograms starting at age 40. This is a scan to check for breast cancer.  Colon cancer screening: It is important to start screening for colon cancer at age 45.  Have a colonoscopy test every 10 years (or more often if you're at risk) Or, ask your provider about stool tests like a FIT test every year or Cologuard test every 3 years.  To learn more about your testing options, visit:   .  For help making a decision, visit:   https://bit.ly/nu15187.  Prostate cancer screening test: If you have a prostate, ask your care team if a prostate cancer screening test (PSA) at age 55 is right for you.  Lung cancer screening: If you are a current or former smoker ages 50 to 80, ask your care team if ongoing lung cancer screenings are right for you.  For informational purposes only. Not to replace the advice of your health care provider. Copyright   2023 [facility]. All rights reserved. Clinically reviewed by the [facility]. travayl [id number] - REV 01/24.  Substance Use Disorder: Care Instructions  Overview     You can improve your life and health by stopping your use of alcohol or drugs. When you don't drink or use drugs, you may feel and sleep better. You may  get along better with your family, friends, and coworkers. There are medicines and programs that can help with substance use disorder.  How can you care for yourself at home?  Here are some ways to help you stay sober and prevent relapse.  If you have been given medicine to help keep you sober or reduce your cravings, be sure to take it exactly as prescribed.  Talk to your doctor about programs that can help you stop using drugs or drinking alcohol.  Do not keep alcohol or drugs in your home.  Plan ahead. Think about what you'll say if other people ask you to drink or use drugs. Try not to spend time with people who drink or use drugs.  Use the time and money spent on drinking or drugs to do something that's important to you.  Preventing a relapse  Have a plan to deal with relapse. Learn to recognize changes in your thinking that lead you to drink or use drugs. Get help before you start to drink or use drugs again.  Try to stay away from situations, friends, or places that may lead you to drink or use drugs.  If you feel the need to drink alcohol or use drugs again, seek help right away. Call a trusted friend or family member. Some people get support from organizations such as Narcotics Anonymous or [employer] or from treatment facilities.  If you relapse, get help as soon as you can. Some people make a plan with another person that outlines what they want that person to do for them if they relapse. The plan usually includes how to handle the relapse and who to notify in case of relapse.  Don't give up. Remember that a relapse doesn't mean that you have failed. Use the experience to learn the triggers that lead you to drink or use drugs. Then quit again. Recovery is a lifelong process. Many people have several relapses before they are able to quit for good.  Follow-up care is a key part of your treatment and safety. Be sure to make and go to all appointments, and call your doctor if you are having problems. It's  "also a good idea to know your test results and keep a list of the medicines you take.  When should you call for help?   Call 911  anytime you think you may need emergency care. For example, call if you or someone else:    Has overdosed or has withdrawal signs. Be sure to tell the emergency workers that you are or someone else is using or trying to quit using drugs. Overdose or withdrawal signs may include:  Losing consciousness.  Seizure.  Seeing or hearing things that aren't there (hallucinations).     Is thinking or talking about suicide or harming others.   Where to get help 24 hours a day, 7 days a week   If you or someone you know talks about suicide, self-harm, a mental health crisis, a substance use crisis, or any other kind of emotional distress, get help right away. You can:    Call the Suicide and Crisis Lifeline at 988.     Call 7-483-544-TALK (1-115.125.5818).     Text HOME to 445476 to access the Crisis Text Line.   Consider saving these numbers in your phone.  Go to [employer] for more information or to chat online.  Call your doctor now or seek immediate medical care if:    You are having withdrawal symptoms. These may include nausea or vomiting, sweating, shakiness, and anxiety.   Watch closely for changes in your health, and be sure to contact your doctor if:    You have a relapse.     You need more help or support to stop.   Where can you learn more?  Go to https://www.[employer].net/patiented  Enter H573 in the search box to learn more about \"Substance Use Disorder: Care Instructions.\"  Current as of: November 15, 2023  Content Version: 14.3    2024 [employer].   Care instructions adapted under license by your healthcare professional. If you have questions about a medical condition or this instruction, always ask your healthcare professional. [employer] disclaims any warranty or liability for your use of this information.       "

## 2025-02-27 NOTE — ASSESSMENT & PLAN NOTE
S: Patient diagnosed with juvenile rheumatoid arthritis at age 4. Currently managed with Enbrel, which was initiated at age 16 and has been effective. Patient reports rare flare-ups in the last 10 years. Recent weather changes have caused mild joint achiness. Patient is followed by Dr. Hubbard at [facility] for rheumatology care.  Continue Enbrel as prescribed  Latest research from American College of rheumatology 2022 guidelines recommend zoster vaccination, Given immunosuppression

## 2025-02-27 NOTE — ASSESSMENT & PLAN NOTE
Patient was diagnosed with ADHD around age 13 and started stimulants in adulthood. Last Ritalin prescription was in 2023 at 20mg dose. Patient discontinued Ritalin for family planning but now desires to restart due to its effectiveness in managing symptoms and potentially aiding in ovulation tracking.    Restart Ritalin 20mg    Patient to titrate dose based on symptom control    Discontinue if pregnancy occurs, especially during weeks 3-8 of gestation, appreciate OBGYN input    Consider reducing Wellbutrin dose if anxiety increases with Ritalin restart

## 2025-03-16 ENCOUNTER — HOSPITAL ENCOUNTER (EMERGENCY)
Facility: OTHER | Age: 35
Discharge: HOME | End: 2025-03-16
Attending: PHYSICIAN ASSISTANT
Payer: COMMERCIAL

## 2025-03-16 VITALS
WEIGHT: 150 LBS | BODY MASS INDEX: 24.99 KG/M2 | HEIGHT: 65 IN | TEMPERATURE: 97.3 F | RESPIRATION RATE: 20 BRPM | HEART RATE: 109 BPM | SYSTOLIC BLOOD PRESSURE: 133 MMHG | OXYGEN SATURATION: 100 % | DIASTOLIC BLOOD PRESSURE: 90 MMHG

## 2025-03-16 DIAGNOSIS — N92.1 METRORRHAGIA: ICD-10-CM

## 2025-03-16 DIAGNOSIS — E87.6 HYPOKALEMIA: ICD-10-CM

## 2025-03-16 LAB
ABO + RH BLD: NORMAL
ALBUMIN UR-MCNC: 200 MG/DL
ANION GAP SERPL CALCULATED.3IONS-SCNC: 12 MMOL/L (ref 7–15)
APPEARANCE UR: ABNORMAL
APTT PPP: 29 SECONDS (ref 22–38)
BACTERIA #/AREA URNS HPF: ABNORMAL /HPF
BASOPHILS # BLD AUTO: 0 10E3/UL (ref 0–0.2)
BASOPHILS NFR BLD AUTO: 1 %
BILIRUB UR QL STRIP: NEGATIVE
BLD GP AB SCN SERPL QL: NEGATIVE
BUN SERPL-MCNC: 9.1 MG/DL (ref 6–20)
CALCIUM SERPL-MCNC: 9 MG/DL (ref 8.8–10.4)
CHLORIDE SERPL-SCNC: 101 MMOL/L (ref 98–107)
COLOR UR AUTO: ABNORMAL
CREAT SERPL-MCNC: 0.88 MG/DL (ref 0.51–0.95)
EGFRCR SERPLBLD CKD-EPI 2021: 88 ML/MIN/1.73M2
EOSINOPHIL # BLD AUTO: 0.1 10E3/UL (ref 0–0.7)
EOSINOPHIL NFR BLD AUTO: 1 %
ERYTHROCYTE [DISTWIDTH] IN BLOOD BY AUTOMATED COUNT: 11.5 % (ref 10–15)
GLUCOSE SERPL-MCNC: 86 MG/DL (ref 70–99)
GLUCOSE UR STRIP-MCNC: NEGATIVE MG/DL
HCG SERPL QL: NEGATIVE
HCO3 SERPL-SCNC: 24 MMOL/L (ref 22–29)
HCT VFR BLD AUTO: 39.5 % (ref 35–47)
HGB BLD-MCNC: 13.6 G/DL (ref 11.7–15.7)
HGB UR QL STRIP: ABNORMAL
HOLD SPECIMEN: NORMAL
HOLD SPECIMEN: NORMAL
IMM GRANULOCYTES # BLD: 0 10E3/UL
IMM GRANULOCYTES NFR BLD: 0 %
INR PPP: 1.05 (ref 0.85–1.15)
KETONES UR STRIP-MCNC: ABNORMAL MG/DL
LEUKOCYTE ESTERASE UR QL STRIP: ABNORMAL
LYMPHOCYTES # BLD AUTO: 2.1 10E3/UL (ref 0.8–5.3)
LYMPHOCYTES NFR BLD AUTO: 36 %
MAGNESIUM SERPL-MCNC: 2.1 MG/DL (ref 1.7–2.3)
MCH RBC QN AUTO: 31.1 PG (ref 26.5–33)
MCHC RBC AUTO-ENTMCNC: 34.4 G/DL (ref 31.5–36.5)
MCV RBC AUTO: 90 FL (ref 78–100)
MONOCYTES # BLD AUTO: 0.5 10E3/UL (ref 0–1.3)
MONOCYTES NFR BLD AUTO: 9 %
MUCOUS THREADS #/AREA URNS LPF: PRESENT /LPF
NEUTROPHILS # BLD AUTO: 3.2 10E3/UL (ref 1.6–8.3)
NEUTROPHILS NFR BLD AUTO: 54 %
NITRATE UR QL: NEGATIVE
NRBC # BLD AUTO: 0 10E3/UL
NRBC BLD AUTO-RTO: 0 /100
PH UR STRIP: 7 [PH] (ref 5–9)
PLATELET # BLD AUTO: 287 10E3/UL (ref 150–450)
POTASSIUM SERPL-SCNC: 3.1 MMOL/L (ref 3.4–5.3)
RBC # BLD AUTO: 4.38 10E6/UL (ref 3.8–5.2)
RBC URINE: 115 /HPF
SODIUM SERPL-SCNC: 137 MMOL/L (ref 135–145)
SP GR UR STRIP: 1 (ref 1–1.03)
SPECIMEN EXP DATE BLD: NORMAL
UROBILINOGEN UR STRIP-MCNC: NORMAL MG/DL
WBC # BLD AUTO: 6 10E3/UL (ref 4–11)
WBC CLUMPS #/AREA URNS HPF: PRESENT /HPF
WBC URINE: 13 /HPF

## 2025-03-16 PROCEDURE — 81003 URINALYSIS AUTO W/O SCOPE: CPT | Performed by: PHYSICIAN ASSISTANT

## 2025-03-16 PROCEDURE — 99283 EMERGENCY DEPT VISIT LOW MDM: CPT | Performed by: PHYSICIAN ASSISTANT

## 2025-03-16 PROCEDURE — 86850 RBC ANTIBODY SCREEN: CPT | Performed by: PHYSICIAN ASSISTANT

## 2025-03-16 PROCEDURE — 87086 URINE CULTURE/COLONY COUNT: CPT | Performed by: PHYSICIAN ASSISTANT

## 2025-03-16 PROCEDURE — 82310 ASSAY OF CALCIUM: CPT | Performed by: PHYSICIAN ASSISTANT

## 2025-03-16 PROCEDURE — 85730 THROMBOPLASTIN TIME PARTIAL: CPT | Performed by: PHYSICIAN ASSISTANT

## 2025-03-16 PROCEDURE — 36415 COLL VENOUS BLD VENIPUNCTURE: CPT | Performed by: PHYSICIAN ASSISTANT

## 2025-03-16 PROCEDURE — 80048 BASIC METABOLIC PNL TOTAL CA: CPT | Performed by: PHYSICIAN ASSISTANT

## 2025-03-16 PROCEDURE — 85610 PROTHROMBIN TIME: CPT | Performed by: PHYSICIAN ASSISTANT

## 2025-03-16 PROCEDURE — 84703 CHORIONIC GONADOTROPIN ASSAY: CPT | Performed by: PHYSICIAN ASSISTANT

## 2025-03-16 PROCEDURE — 83735 ASSAY OF MAGNESIUM: CPT | Performed by: PHYSICIAN ASSISTANT

## 2025-03-16 PROCEDURE — 85025 COMPLETE CBC W/AUTO DIFF WBC: CPT | Performed by: PHYSICIAN ASSISTANT

## 2025-03-16 PROCEDURE — 86900 BLOOD TYPING SEROLOGIC ABO: CPT | Performed by: PHYSICIAN ASSISTANT

## 2025-03-16 ASSESSMENT — ENCOUNTER SYMPTOMS
DYSURIA: 0
COUGH: 0
FEVER: 0
ABDOMINAL PAIN: 0
LIGHT-HEADEDNESS: 0
SHORTNESS OF BREATH: 0

## 2025-03-16 ASSESSMENT — COLUMBIA-SUICIDE SEVERITY RATING SCALE - C-SSRS
6. HAVE YOU EVER DONE ANYTHING, STARTED TO DO ANYTHING, OR PREPARED TO DO ANYTHING TO END YOUR LIFE?: NO
2. HAVE YOU ACTUALLY HAD ANY THOUGHTS OF KILLING YOURSELF IN THE PAST MONTH?: NO
1. IN THE PAST MONTH, HAVE YOU WISHED YOU WERE DEAD OR WISHED YOU COULD GO TO SLEEP AND NOT WAKE UP?: NO

## 2025-03-16 ASSESSMENT — ACTIVITIES OF DAILY LIVING (ADL)
ADLS_ACUITY_SCORE: 41
ADLS_ACUITY_SCORE: 41

## 2025-03-16 NOTE — ED PROVIDER NOTES
History     Chief Complaint   Patient presents with    Vaginal Bleeding     HPI  Nikky Hurt is a 34 year old female who presents to the ED for evaluation of vaginal bleeding. Patient arrives today with sister in law with heavy vaginal bleeding and large clots.  This started about 1245 today.  She had her regular cycle last week.  Could possibly be pregnant hasn't tested.       Allergies:  No Known Allergies    Problem List:    Patient Active Problem List    Diagnosis Date Noted    NEIDA (generalized anxiety disorder) 09/17/2021     Priority: Medium    Right elbow pain 08/06/2020     Priority: Medium    Rosacea 12/19/2019     Priority: Medium    Chronic pain of left knee 10/15/2019     Priority: Medium    Tendinitis of extensor tendon of right hand 06/12/2018     Priority: Medium    HEATH positive 07/11/2017     Priority: Medium    High risk medication use 07/11/2017     Priority: Medium    Attention deficit hyperactivity disorder (ADHD), combined type 02/01/2017     Priority: Medium     Overview:   Diagnosed in elementary age. Used some medications but cannot recall what at that time.       Tendinitis of right elbow 02/01/2017     Priority: Medium    IBS (irritable bowel syndrome) 06/10/2015     Priority: Medium    Chronic polyarticular juvenile rheumatoid arthritis (H) 06/09/2014     Priority: Medium     Overview:   IMO Update 10/11  Overview:   Diagnosed at age 3 years. Followed at [facility] as a child.   Sees Dr Farmer twice a year      Myopia 03/23/2005     Priority: Medium        Past Medical History:    Past Medical History:   Diagnosis Date    Acne, mild 11/18/2010    HEATH positive 7/11/2017    Attention deficit hyperactivity disorder (ADHD), combined type 2/1/2017    Chronic polyarticular juvenile rheumatoid arthritis (H) 6/9/2014    IBS (irritable bowel syndrome) 6/10/2015    Juvenile rheumatoid arthritis (H) 06/09/2014    Myopia 3/23/2005    Tendinitis of extensor tendon of right hand 6/12/2018     Tendinitis of right elbow 2/1/2017    Varicella without complication        Past Surgical History:    Past Surgical History:   Procedure Laterality Date    ARTHROSCOPY KNEE Left 10/2019    EXTRACTION(S) DENTAL         Family History:    Family History   Problem Relation Age of Onset    Family History Negative Mother         Good Health    Family History Negative Father         Good Health    Breast Cancer Maternal Grandmother         Cancer-breast    Ankylosing Spondylitis Paternal Grandmother         Ankylosing spondylitis    Glaucoma Paternal Grandfather         Glaucoma    Other - See Comments Maternal Aunt         Kidney stones    Other - See Comments Maternal Uncle         Kidney stones       Social History:  Marital Status:   [2]  Social History     Tobacco Use    Smoking status: Never    Smokeless tobacco: Former     Types: Chew    Tobacco comments:     Quit smoking: Rarely, with driving, roomate smokes   Vaping Use    Vaping status: Never Used   Substance Use Topics    Alcohol use: Yes     Alcohol/week: 0.0 standard drinks of alcohol     Comment: Alcoholic Drinks/day: occasional    Drug use: No        Medications:    buPROPion (WELLBUTRIN XL) 300 MG 24 hr tablet  Etanercept (ENBREL SURECLICK) 50 MG/ML autoinjector  fluticasone (FLONASE) 50 MCG/ACT nasal spray  methylphenidate (RITALIN LA) 20 MG 24 hr capsule  [START ON 3/29/2025] methylphenidate (RITALIN LA) 20 MG 24 hr capsule  [START ON 4/28/2025] methylphenidate (RITALIN LA) 20 MG 24 hr capsule  methylphenidate (RITALIN LA) 20 MG 24 hr capsule  methylphenidate (RITALIN LA) 20 MG 24 hr capsule  Prenatal Vit-Fe Fumarate-FA (PRENATAL MULTIVITAMIN  PLUS IRON) 27-1 MG TABS  sertraline (ZOLOFT) 25 MG tablet  sertraline (ZOLOFT) 50 MG tablet          Review of Systems   Constitutional:  Negative for fever.   Respiratory:  Negative for cough and shortness of breath.    Cardiovascular:  Negative for chest pain.   Gastrointestinal:  Negative for abdominal  "pain.   Genitourinary:  Positive for vaginal bleeding. Negative for dysuria and vaginal discharge.   Neurological:  Negative for syncope and light-headedness.       Physical Exam   BP: 133/90  Pulse: 109  Temp: 97.3  F (36.3  C)  Resp: 20  Height: 165.1 cm (5' 5\")  Weight: 68 kg (150 lb)  SpO2: 100 %      Physical Exam  Constitutional:       General: She is not in acute distress.     Appearance: She is well-developed. She is not ill-appearing or diaphoretic.   HENT:      Head: Normocephalic and atraumatic.   Cardiovascular:      Rate and Rhythm: Normal rate and regular rhythm.   Pulmonary:      Effort: Pulmonary effort is normal.      Breath sounds: Normal breath sounds.   Abdominal:      Palpations: Abdomen is soft.      Tenderness: There is no abdominal tenderness.   Neurological:      Mental Status: She is alert. Mental status is at baseline.   Psychiatric:         Mood and Affect: Mood normal.         Behavior: Behavior normal.         ED Course        Procedures              Critical Care time:  none              No results found for this or any previous visit (from the past 24 hours).      Medications - No data to display    Assessments & Plan (with Medical Decision Making)   Pt nontoxic in NAD. Heart, lung, bowel sounds normal, abd soft, nontender to palpation, nondistended. VSS, afebrile.     She has mild hypokalemia, otherwise very stable lab findings. Neg HCG.     We discussed obtaining pelvic US, she declined at this time. Presentation consistent with metrorrhagia, however, She does not appear to have life threatening vaginal bleeding.     From discharge:  As discussed, all of your vital signs, physical exam and lab studies appear well today.  We discussed obtaining ultrasound today versus outpatient, it was decided to continue as an outpatient.  You can always try some ibuprofen, 400 mg every 6 hours that can sometimes help constrict the uterus and slow the bleeding.  I did place a referral for you to " follow-up with OB/GYN.  You should return to the ED if you are having symptomatic bleeding including shortness of breath, lightheadedness or bleeding through 1 full pad per hour for 2 consecutive hours.    Strict return precautions are given to the pt, they will return if symptoms are worsening or concerning. The pt understands and agrees with the plan and they are discharged.     Renaldo Tobias PA-C    I have reviewed the nursing notes.    I have reviewed the findings, diagnosis, plan and need for follow up with the patient.        Discharge Medication List as of 3/16/2025  4:32 PM          Final diagnoses:   Metrorrhagia   Hypokalemia       3/16/2025   [facility] [place]       Renaldo Tobias PA  03/19/25 1006

## 2025-03-16 NOTE — DISCHARGE INSTRUCTIONS
Get plenty of fluids and rest.  As discussed, all of your vital signs, physical exam and lab studies appear well today.  We discussed obtaining ultrasound today versus outpatient, it was decided to continue as an outpatient.  You can always try some ibuprofen, 400 mg every 6 hours that can sometimes help constrict the uterus and slow the bleeding.  I did place a referral for you to follow-up with OB/GYN.  You should return to the ED if you are having symptomatic bleeding including shortness of breath, lightheadedness or bleeding through 1 full pad per hour for 2 consecutive hours.

## 2025-03-16 NOTE — ED TRIAGE NOTES
Patient arrives today with sister in law with heavy vaginal bleeding and large clots.  This started about 1245 today.  She had her regular cycle last week.  Could possibly be pregnant hasn't tested.     Triage Assessment (Adult)       Row Name 03/16/25 1410          Triage Assessment    Airway WDL WDL        Respiratory WDL    Respiratory WDL WDL        Cardiac WDL    Cardiac WDL X;rhythm     Pulse Rate & Regularity tachycardic        Peripheral/Neurovascular WDL    Peripheral Neurovascular WDL WDL        Cognitive/Neuro/Behavioral WDL    Cognitive/Neuro/Behavioral WDL WDL

## 2025-03-17 ENCOUNTER — HOSPITAL ENCOUNTER (OUTPATIENT)
Dept: ULTRASOUND IMAGING | Facility: OTHER | Age: 35
Discharge: HOME | End: 2025-03-17
Attending: PHYSICIAN ASSISTANT | Admitting: PHYSICIAN ASSISTANT
Payer: COMMERCIAL

## 2025-03-17 DIAGNOSIS — N92.1 METRORRHAGIA: ICD-10-CM

## 2025-03-17 PROCEDURE — 76830 TRANSVAGINAL US NON-OB: CPT

## 2025-03-18 LAB — BACTERIA UR CULT: NO GROWTH

## 2025-03-19 ASSESSMENT — PATIENT HEALTH QUESTIONNAIRE - PHQ9
10. IF YOU CHECKED OFF ANY PROBLEMS, HOW DIFFICULT HAVE THESE PROBLEMS MADE IT FOR YOU TO DO YOUR WORK, TAKE CARE OF THINGS AT HOME, OR GET ALONG WITH OTHER PEOPLE: NOT DIFFICULT AT ALL
SUM OF ALL RESPONSES TO PHQ QUESTIONS 1-9: 3
SUM OF ALL RESPONSES TO PHQ QUESTIONS 1-9: 3

## 2025-03-20 ENCOUNTER — OFFICE VISIT (OUTPATIENT)
Dept: OBGYN | Facility: OTHER | Age: 35
End: 2025-03-20
Attending: OBSTETRICS & GYNECOLOGY
Payer: COMMERCIAL

## 2025-03-20 VITALS
DIASTOLIC BLOOD PRESSURE: 86 MMHG | SYSTOLIC BLOOD PRESSURE: 120 MMHG | OXYGEN SATURATION: 98 % | WEIGHT: 147.7 LBS | TEMPERATURE: 98.6 F | BODY MASS INDEX: 24.58 KG/M2 | RESPIRATION RATE: 16 BRPM | HEART RATE: 94 BPM

## 2025-03-20 DIAGNOSIS — N92.1 METRORRHAGIA: ICD-10-CM

## 2025-03-20 LAB — TSH SERPL DL<=0.005 MIU/L-ACNC: 3.98 UIU/ML (ref 0.3–4.2)

## 2025-03-20 ASSESSMENT — PAIN SCALES - GENERAL: PAINLEVEL_OUTOF10: NO PAIN (0)

## 2025-03-20 NOTE — PROGRESS NOTES
CC:Heavy menses for 3 hours with clots   HPI:  Nikky is a 34 year old female who presents for heavy menses with clots last  lasting two hours.  She presented to the ED due to the really heavy nature and was evaluated. She had Pelvic Us which was WNL She also had clotting studies done which were within normal limits. Bleeding slowly slowed down then returned to normal then eventually stopped few days later.  She has a menstrual history that is very irregular.  She will have bleeding that lasts up approximately few days will start for few days then restart again for another few days.  She reports the flow is mild to moderate during these days and is not heavy.  She does not usually have clots.  She reports that this happens every few weeks.  She has not had a regular period for quite some time.  They have been trying to achieve pregnancy for approximately last 3 years.  They were undergoing fertility evaluation and treatment with Dr. Karlene Liu in .  They did however stop this due to some financial and insurance concerns.  They are going to be pursuing this again with Dr. Glenda Vital.  Patient reports that she has no family history of any bleeding disorders.  No abnormal bruising.  She is not on any contraceptive methods as they are trying to achieve pregnancy.  She has not had a thyroid done in approximately 2 years.  Hormonal testing has not been completed in platelet.  She is unsure if she is ovulating regularly as when she takes test she does not ever get an ovulation postpositive.  No concerns for infection or STI.  Pregnancy testing was negative.  Patient's last menstrual period was 2025 (exact date).      Pap Smears:3/1/23- due 2028  Mammograms:NA    OB History    Para Term  AB Living   0 0 0 0 0 0   SAB IAB Ectopic Multiple Live Births   0 0 0 0 0     Past Medical History:   Diagnosis Date    Acne, mild 2010    HEATH positive 2017    Attention deficit hyperactivity  disorder (ADHD), combined type 2/1/2017    Overview:  Diagnosed in elementary age. Used some medications but cannot recall what at that time.     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 Followed at [facility] as a child.  Sees Dr Farmer twice a year    IBS (irritable bowel syndrome) 6/10/2015    Juvenile rheumatoid arthritis (H) 06/09/2014    Diagnosed at age 3 years, followed by [facility] as a child.  Follows Dr Farmer twice a year.    Myopia 3/23/2005    Tendinitis of extensor tendon of right hand 6/12/2018    Tendinitis of right elbow 2/1/2017    Varicella without complication     As a child       Current Outpatient Medications   Medication Sig Dispense Refill    buPROPion (WELLBUTRIN XL) 300 MG 24 hr tablet Take 1 tablet (300 mg) by mouth every morning. 90 tablet 4    Etanercept (ENBREL SURECLICK) 50 MG/ML autoinjector Inject 50 mg Subcutaneous      fluticasone (FLONASE) 50 MCG/ACT nasal spray Spray 1 spray into both nostrils daily 18.2 mL 0    methylphenidate (RITALIN LA) 20 MG 24 hr capsule Take 1 capsule (20 mg) by mouth daily. 30 capsule 0    [START ON 3/29/2025] methylphenidate (RITALIN LA) 20 MG 24 hr capsule Take 1 capsule (20 mg) by mouth daily. 30 capsule 0    [START ON 4/28/2025] methylphenidate (RITALIN LA) 20 MG 24 hr capsule Take 1 capsule (20 mg) by mouth daily. 30 capsule 0    Prenatal Vit-Fe Fumarate-FA (PRENATAL MULTIVITAMIN  PLUS IRON) 27-1 MG TABS Take by mouth daily      sertraline (ZOLOFT) 25 MG tablet TAKE 1 TABLET BY MOUTH ONCE DAILY ALONG WITH 50MG TABLET FOR A TOTAL DAILY DOSE OF 75MG 90 tablet 4    sertraline (ZOLOFT) 50 MG tablet Take 1.5 tablets (75 mg) by mouth daily. 90 tablet 4    methylphenidate (RITALIN LA) 20 MG 24 hr capsule Take 20 mg by mouth daily (Patient not taking: Reported on 9/29/2023) 30 capsule 0    methylphenidate (RITALIN LA) 20 MG 24 hr capsule Take 20 mg by mouth daily (Patient  not taking: Reported on 9/29/2023) 30 capsule 0     No current facility-administered medications for this visit.     No Known Allergies  /86   Pulse 94   Temp 98.6  F (37  C) (Tympanic)   Resp 16   Wt 67 kg (147 lb 11.2 oz)   LMP 02/11/2025 (Exact Date)   SpO2 98%   BMI 24.58 kg/m      REVIEW OF SYSTEMS  As Per HPI, Otherwise negative.     Exam:  Constitutional: healthy, alert, and no distress  Psych: normal affect   Pulm: nonlabored, easily talks in full sentences   Pelvic: Normal BUSE, vulva appears normal, vagina and cervix are normal. small discharge. Uterus is normal size, shape position and mobility without adnexal mass or tenderness. Chaperone was present.         Lab: No results found for any visits on 03/20/25.    ASSESSMENT/PLAN :  1. Metrorrhagia    Some reviewed and was within normal limits.  Ovaries do not appear PCOS like.  Recommend thyroid testing today for patient.  Patient CBC as well as INR and PT were within normal limits in the emergency room.  Discussed this is reassuring with patient.  We discussed today that this very likely could have been because due to her actually ovulating prior to this and building a thicker endometrium leading to this increased bleeding that she had.  Also discussed that sometimes irregular thyroid can cause abnormal bleeding like this as well.  We discussed management strategies with this such as starting the ibuprofen a few days prior to her menses to help decrease inflammation utilizing transischemic acid as well as birth control methods.  As patient is attempting to conceive we discussed we could also do watchful waiting and see if her next menstrual cycle is like this as well.  She voices she would like to do some watchful waiting at this time.  Thyroid is completed to rule out abnormalities.  She will follow-up with Dr. Glenda Vital in 18 days for reevaluation of her fertility status.  At this time we can reassess her menses to see if they were  heavier increased.     GAGAN Rubin CNP  3:35 PM 3/20/2025       Dragon dictation is used to format and dictate this note. Any errors are unintentional.

## 2025-03-20 NOTE — NURSING NOTE
"Chief Complaint   Patient presents with    Abnormal Uterine Bleeding     Sunday for 2 hours, heavy bleeding with clots and painful cramps       Initial /86   Pulse 94   Temp 98.6  F (37  C) (Tympanic)   Resp 16   Wt 67 kg (147 lb 11.2 oz)   LMP 02/11/2025 (Exact Date)   SpO2 98%   BMI 24.58 kg/m   Estimated body mass index is 24.58 kg/m  as calculated from the following:    Height as of 3/16/25: 1.651 m (5' 5\").    Weight as of this encounter: 67 kg (147 lb 11.2 oz).  Medication Review: complete    The next two questions are to help us understand your food security.  If you are feeling you need any assistance in this area, we have resources available to support you today.          2/26/2025   SDOH- Food Insecurity   Within the past 12 months, did you worry that your food would run out before you got money to buy more? N   Within the past 12 months, did the food you bought just not last and you didn t have money to get more? N         Health Care Directive:  Patient does not have a Health Care Directive: Discussed advance care planning with patient; however, patient declined at this time.    Norma J. Gosselin, LPN      "

## 2025-04-11 ENCOUNTER — OFFICE VISIT (OUTPATIENT)
Dept: OBGYN | Facility: OTHER | Age: 35
End: 2025-04-11
Attending: FAMILY MEDICINE
Payer: COMMERCIAL

## 2025-04-11 VITALS
SYSTOLIC BLOOD PRESSURE: 124 MMHG | OXYGEN SATURATION: 98 % | WEIGHT: 148.5 LBS | DIASTOLIC BLOOD PRESSURE: 84 MMHG | RESPIRATION RATE: 16 BRPM | HEIGHT: 65 IN | HEART RATE: 90 BPM | BODY MASS INDEX: 24.74 KG/M2

## 2025-04-11 DIAGNOSIS — Z31.49 PROCREATIVE INVESTIGATION AND TESTING: Primary | ICD-10-CM

## 2025-04-11 DIAGNOSIS — N97.9 FEMALE INFERTILITY: ICD-10-CM

## 2025-04-11 LAB
ESTRADIOL SERPL-MCNC: 60 PG/ML
FSH SERPL IRP2-ACNC: 4.9 MIU/ML
LH SERPL-ACNC: 8.6 MIU/ML
PROLACTIN SERPL 3RD IS-MCNC: 21 NG/ML (ref 5–23)
T4 FREE SERPL-MCNC: 1.36 NG/DL (ref 0.9–1.7)
TSH SERPL DL<=0.005 MIU/L-ACNC: 1.96 UIU/ML (ref 0.3–4.2)

## 2025-04-11 PROCEDURE — 1126F AMNT PAIN NOTED NONE PRSNT: CPT | Performed by: OBSTETRICS & GYNECOLOGY

## 2025-04-11 PROCEDURE — 82166 ASSAY ANTI-MULLERIAN HORM: CPT | Mod: ZL | Performed by: OBSTETRICS & GYNECOLOGY

## 2025-04-11 PROCEDURE — 83002 ASSAY OF GONADOTROPIN (LH): CPT | Mod: ZL | Performed by: OBSTETRICS & GYNECOLOGY

## 2025-04-11 PROCEDURE — 83001 ASSAY OF GONADOTROPIN (FSH): CPT | Mod: ZL | Performed by: OBSTETRICS & GYNECOLOGY

## 2025-04-11 PROCEDURE — 3079F DIAST BP 80-89 MM HG: CPT | Performed by: OBSTETRICS & GYNECOLOGY

## 2025-04-11 PROCEDURE — 3074F SYST BP LT 130 MM HG: CPT | Performed by: OBSTETRICS & GYNECOLOGY

## 2025-04-11 PROCEDURE — 84439 ASSAY OF FREE THYROXINE: CPT | Mod: ZL | Performed by: OBSTETRICS & GYNECOLOGY

## 2025-04-11 PROCEDURE — 84443 ASSAY THYROID STIM HORMONE: CPT | Mod: ZL | Performed by: OBSTETRICS & GYNECOLOGY

## 2025-04-11 PROCEDURE — 82670 ASSAY OF TOTAL ESTRADIOL: CPT | Mod: ZL | Performed by: OBSTETRICS & GYNECOLOGY

## 2025-04-11 PROCEDURE — 84146 ASSAY OF PROLACTIN: CPT | Mod: ZL | Performed by: OBSTETRICS & GYNECOLOGY

## 2025-04-11 PROCEDURE — 36415 COLL VENOUS BLD VENIPUNCTURE: CPT | Mod: ZL | Performed by: OBSTETRICS & GYNECOLOGY

## 2025-04-11 PROCEDURE — 99213 OFFICE O/P EST LOW 20 MIN: CPT | Performed by: OBSTETRICS & GYNECOLOGY

## 2025-04-11 ASSESSMENT — ANXIETY QUESTIONNAIRES
4. TROUBLE RELAXING: SEVERAL DAYS
IF YOU CHECKED OFF ANY PROBLEMS ON THIS QUESTIONNAIRE, HOW DIFFICULT HAVE THESE PROBLEMS MADE IT FOR YOU TO DO YOUR WORK, TAKE CARE OF THINGS AT HOME, OR GET ALONG WITH OTHER PEOPLE: SOMEWHAT DIFFICULT
5. BEING SO RESTLESS THAT IT IS HARD TO SIT STILL: SEVERAL DAYS
GAD7 TOTAL SCORE: 5
7. FEELING AFRAID AS IF SOMETHING AWFUL MIGHT HAPPEN: NOT AT ALL
7. FEELING AFRAID AS IF SOMETHING AWFUL MIGHT HAPPEN: NOT AT ALL
6. BECOMING EASILY ANNOYED OR IRRITABLE: SEVERAL DAYS
8. IF YOU CHECKED OFF ANY PROBLEMS, HOW DIFFICULT HAVE THESE MADE IT FOR YOU TO DO YOUR WORK, TAKE CARE OF THINGS AT HOME, OR GET ALONG WITH OTHER PEOPLE?: SOMEWHAT DIFFICULT
GAD7 TOTAL SCORE: 5
GAD7 TOTAL SCORE: 5
2. NOT BEING ABLE TO STOP OR CONTROL WORRYING: NOT AT ALL
1. FEELING NERVOUS, ANXIOUS, OR ON EDGE: SEVERAL DAYS
3. WORRYING TOO MUCH ABOUT DIFFERENT THINGS: SEVERAL DAYS

## 2025-04-11 ASSESSMENT — PATIENT HEALTH QUESTIONNAIRE - PHQ9
10. IF YOU CHECKED OFF ANY PROBLEMS, HOW DIFFICULT HAVE THESE PROBLEMS MADE IT FOR YOU TO DO YOUR WORK, TAKE CARE OF THINGS AT HOME, OR GET ALONG WITH OTHER PEOPLE: NOT DIFFICULT AT ALL
SUM OF ALL RESPONSES TO PHQ QUESTIONS 1-9: 2
SUM OF ALL RESPONSES TO PHQ QUESTIONS 1-9: 2

## 2025-04-11 ASSESSMENT — PAIN SCALES - GENERAL: PAINLEVEL_OUTOF10: NO PAIN (0)

## 2025-04-11 NOTE — PROGRESS NOTES
"[facility]  Gynecology visit  CC: chief complaint       S: Nikky Hurt is a 34 year old  who presents today for evaluation of **.  - 3/9-3/12, 3/15-3/16  -  -   - Has tried OPK in the past, only had 1 positive in the past (usually just gets a faint line)     O: /84   Pulse 90   Resp 16   Ht 1.651 m (5' 5\")   Wt 67.4 kg (148 lb 8 oz)   LMP 2025 (Exact Date)   SpO2 98%   Breastfeeding No   BMI 24.71 kg/m     General: No distress   Resp: breathing comfortably on room air  Neuro: grossly intact  Abdomen: Soft, non-tender, non-distended, no masses   Pelvic Exam:   Vulva: No external lesions, normal hair distribution, normal architecture   Vagina: Moist, pink, no abnormal discharge, well rugated, no lesions   Cervix: smooth, pink, no visible lesions   Uterus: Normal size, anteverted, non-tender, mobile   Adnexa: Non-tender, no masses      A/p: Nikky Hurt is a 34 year old  who presents today for evaluation of ***.     # ***  -     *** minutes was spent reviewing the electronic medical record, face to face time with [unfilled], counseling and coordinating care with the patient, family and/or caregivers and post visit documentation.     Olga Vital MD    " Recommended repeating hormonal testing and US results before proceeding with this. Plan to meet back to discuss plan after these are completed  - Nikky is uncertain if they would consider IVF in the future. Will discuss with Regan Vital MD

## 2025-04-11 NOTE — NURSING NOTE
"Chief Complaint   Patient presents with    Consult For     infertility   She has been in to talk to a provider about infertility in the past and would like to talk to a provider about it again.  Lou Gipson LPN..................4/11/2025   1:33 PM      Initial /84   Pulse 90   Resp 16   Ht 1.651 m (5' 5\")   Wt 67.4 kg (148 lb 8 oz)   LMP 04/07/2025 (Exact Date)   SpO2 98%   Breastfeeding No   BMI 24.71 kg/m   Estimated body mass index is 24.71 kg/m  as calculated from the following:    Height as of this encounter: 1.651 m (5' 5\").    Weight as of this encounter: 67.4 kg (148 lb 8 oz).  Medication Review: complete    The next two questions are to help us understand your food security.  If you are feeling you need any assistance in this area, we have resources available to support you today.          2/26/2025   SDOH- Food Insecurity   Within the past 12 months, did you worry that your food would run out before you got money to buy more? N   Within the past 12 months, did the food you bought just not last and you didn t have money to get more? N         Health Care Directive:  Patient does not have a Health Care Directive: Discussed advance care planning with patient; however, patient declined at this time.    Lou Gipson LPN      "

## 2025-04-12 LAB — MIS SERPL-MCNC: 3.31 NG/ML (ref 0.58–8.1)

## 2025-04-15 ENCOUNTER — MYC REFILL (OUTPATIENT)
Dept: OBGYN | Facility: OTHER | Age: 35
End: 2025-04-15
Payer: COMMERCIAL

## 2025-04-15 DIAGNOSIS — E03.8 SUBCLINICAL HYPOTHYROIDISM: ICD-10-CM

## 2025-04-15 DIAGNOSIS — F90.2 ATTENTION DEFICIT HYPERACTIVITY DISORDER (ADHD), COMBINED TYPE: ICD-10-CM

## 2025-04-15 RX ORDER — LEVOTHYROXINE SODIUM 50 UG/1
50 TABLET ORAL
Qty: 30 TABLET | Refills: 0 | Status: SHIPPED | OUTPATIENT
Start: 2025-04-15

## 2025-04-15 NOTE — TELEPHONE ENCOUNTER
[id number] sent Rx request for the following:      Requested Prescriptions   Pending Prescriptions Disp Refills    levothyroxine (SYNTHROID/LEVOTHROID) 50 MCG tablet 30 tablet 0     Sig: Take 1 tablet (50 mcg) by mouth every morning (before breakfast).       Thyroid Protocol Passed - 4/15/2025 12:32 PM        Passed - Patient is 12 years or older        Passed - Medication is active on med list and the sig matches. RN to manually verify dose and sig if red X/fail.     If the protocol passes (green check), you do not need to verify med dose and sig.    A prescription matches if they are the same clinical intention.    For Example: once daily and every morning are the same.    The protocol can not identify upper and lower case letters as matching and will fail.     For Example: Take 1 tablet (50 mg) by mouth daily     TAKE 1 TABLET (50 MG) BY MOUTH DAILY    For all fails (red x), verify dose and sig.    If the refill does match what is on file, the RN can still proceed to approve the refill request.       If they do not match, route to the appropriate provider.             Passed - Recent (12 mo) or future (90 days) visit within the authorizing provider's specialty     The patient must have completed an in-person or virtual visit within the past 12 months or has a future visit scheduled within the next 90 days with the authorizing provider s specialty.  Urgent care and e-visits do not qualify as an office visit for this protocol.          Passed - Medication indicated for associated diagnosis     Medication is associated with one or more of the following diagnoses:  Hypothyroidism  Thyroid stimulating hormone suppression therapy  Thyroid cancer  Acquired atrophy of thyroid          Passed - Normal TSH on file in past 12 months     Recent Labs   Lab Test 04/11/25  1407   TSH 1.96              Passed - No active pregnancy on record     If patient is pregnant or has had a positive pregnancy test, please check TSH.           Passed - No positive pregnancy test in past 12 months     If patient is pregnant or has had a positive pregnancy test, please check TSH.               Last Prescription Date:   3/21/25  Last Fill Qty/Refills:         30, R-0    Last Office Visit:              4/11/25 VITO   Future Office visit:             Next 5 appointments (look out 90 days)      Apr 28, 2025 4:15 PM  (Arrive by 4:00 PM)  Nurse Visit with  INJECTION NURSE  [facility] ([facility]) [address]  [place] [address]  [phone]     May 02, 2025 2:20 PM  Provider Visit with Olga Vital MD  [facility] ([facility]) [address]  [place] [address]  [phone]     May 23, 2025 4:30 PM  (Arrive by 4:15 PM)  Provider Visit with Chance Daly MD  [facility] ([facility]) [address]  [place] [address]  [phone]           Emelina Dietrich RN on 4/15/2025 at 12:32 PM

## 2025-04-17 RX ORDER — METHYLPHENIDATE HYDROCHLORIDE 20 MG/1
CAPSULE, EXTENDED RELEASE ORAL
Qty: 30 CAPSULE | Refills: 0 | Status: SHIPPED | OUTPATIENT
Start: 2025-04-17

## 2025-04-17 NOTE — TELEPHONE ENCOUNTER
[id number] sent Rx request for the following:     From pharmacy: PATIENT IS REQUESTING REFILLS PLEASE. CLAIMING DOC IS SUPPOSE TO SEND 3?     Per chart review, the following prescriptions are on the active medication list, for the following:  Requested Prescriptions   Pending Prescriptions Disp Refills    methylphenidate (RITALIN LA) 20 MG 24 hr capsule [Pharmacy Med Name: METHYLPHENIDATE LA 20MG ER CAP] 30 capsule 0     Sig:  TAKE 1 CAPSULE BY MOUTH DAILY       Signed 2/27/25: (Listed twice on active medication list)  2/27/25, #30, R-0, [facility]  3/29/25, #30, R-0, [facility]  4/28/25, #30, R-0, [facility]    Signed 2/27/25:  2/27/25, #30, R-0, [facility]  3/29/25, #30, R-0, [facility]  4/28/25, #30, R-0, [facility]    Please send new prescription to [id number]. Melanie Gasca, Refill RN .............. 4/17/2025  2:02 PM

## 2025-04-28 ENCOUNTER — ALLIED HEALTH/NURSE VISIT (OUTPATIENT)
Dept: FAMILY MEDICINE | Facility: OTHER | Age: 35
End: 2025-04-28
Attending: FAMILY MEDICINE
Payer: COMMERCIAL

## 2025-04-28 DIAGNOSIS — Z23 ENCOUNTER FOR IMMUNIZATION: Primary | ICD-10-CM

## 2025-04-28 PROCEDURE — 90750 HZV VACC RECOMBINANT IM: CPT

## 2025-04-28 PROCEDURE — 90471 IMMUNIZATION ADMIN: CPT

## 2025-04-28 NOTE — PROGRESS NOTES
Prior to immunization administration, verified patients identity using patient s name and date of birth. Please see Immunization Activity for additional information.     Is the patient's temperature normal (100.5 or less)? Yes     Patient MEETS CRITERIA. PROCEED with vaccine administration.        4/28/2025   General Questionnaire    Do you have any questions for your care team about the vaccines you will be receiving today? no             4/28/2025   Zoster   Have you had a serious reaction to the shingles vaccine or something in the shingles vaccine? No   Do you have shingles now? No   Are you getting treatment for cancer, organ transplant, or bone marrow transplant? No   Do you have an autoimmune or inflammatory condition? !YES   Is the patient immunocompromised and wanting to complete the Shingles vaccine series in less than 2 months? !YES   Have you had Guillain-Petersham syndrome within 6 weeks of getting a vaccine? No         DO NOT VACCINATE. Patient is NOT eligible for vaccination. Discuss with provider at upcoming appointment if they have questions.      Patient instructed to remain in clinic for 15 minutes afterwards, and to report any adverse reactions.      Link to Ancillary Visit Immunization Standing Orders SmartSet     Screening performed by Daniel Duron RN on 4/28/2025 at 4:22 PM.

## 2025-05-02 ENCOUNTER — OFFICE VISIT (OUTPATIENT)
Dept: OBGYN | Facility: OTHER | Age: 35
End: 2025-05-02
Attending: OBSTETRICS & GYNECOLOGY
Payer: COMMERCIAL

## 2025-05-02 VITALS
WEIGHT: 148.5 LBS | SYSTOLIC BLOOD PRESSURE: 118 MMHG | BODY MASS INDEX: 24.71 KG/M2 | DIASTOLIC BLOOD PRESSURE: 70 MMHG | HEART RATE: 91 BPM

## 2025-05-02 DIAGNOSIS — Z31.49 PROCREATIVE INVESTIGATION AND TESTING: ICD-10-CM

## 2025-05-02 DIAGNOSIS — N93.9 ABNORMAL UTERINE BLEEDING (AUB): Primary | ICD-10-CM

## 2025-05-02 PROCEDURE — 3078F DIAST BP <80 MM HG: CPT | Performed by: OBSTETRICS & GYNECOLOGY

## 2025-05-02 PROCEDURE — 99213 OFFICE O/P EST LOW 20 MIN: CPT | Performed by: OBSTETRICS & GYNECOLOGY

## 2025-05-02 PROCEDURE — 3074F SYST BP LT 130 MM HG: CPT | Performed by: OBSTETRICS & GYNECOLOGY

## 2025-05-02 PROCEDURE — 1126F AMNT PAIN NOTED NONE PRSNT: CPT | Performed by: OBSTETRICS & GYNECOLOGY

## 2025-05-02 ASSESSMENT — PAIN SCALES - GENERAL: PAINLEVEL_OUTOF10: NO PAIN (0)

## 2025-05-02 NOTE — PROGRESS NOTES
[facility]  Gynecology visit  CC: infertility      S: Nikky Hurt is a 34 year old  who presents today to discuss infertility further      Prior cycle review  3/20/24 pelvic US: EMS 8.7 mm, no follicles, ? Hemorrhagic cyst, no trigger  24 pelvic US: EMS 6 mm, 15 mm follicle, HCG trigger next day and TI  24 pelvic US: EMS 8 mm, trilaminar 21 mm follicle. letrozole 5 mg, HCG trigger + TI  12/15/23 US: POC US, 11 mm follicle, plan repeat US in 72 hours. Did letyrozole 2.5 mg CD#3-7     O: /70   Pulse 91   Wt 67.4 kg (148 lb 8 oz)   LMP 2025 (Exact Date)   BMI 24.71 kg/m     General: No distress   Resp: breathing comfortably on room air  Neuro: grossly intact  Abdomen: Soft, non-tender, non-distended, no masses   Pelvic Exam:   Vulva: No external lesions, normal hair distribution, normal architecture   Vagina: Moist, pink, no abnormal discharge, well rugated, no lesions   Cervix: smooth, pink, no visible lesions   Uterus: Normal size, anteverted, non-tender, mobile   Adnexa: Non-tender, no masses     HSG: no spillage from right fallopain tube, spillage from left fallopian tube    Regan Hurt's SA results  Component      Latest Ref Rng 2024  12:30 PM   Semen Volume      >=1.4 mL 2.6    Semen pH      >7.1  8.5    Semen Liquifaction      <60 minutes after collection  <60 minutes after collection    Semen Color      Gray opalescent  Sanchez opalescent    Semen Viscosity      Falls in droplets  Falls in droplets    Semen Count      >38.9 million/mL 13.0 (L)    Semen Motility      >=40 % 37 (L)       Legend:  (L) Low     A/p: Nikky Hurt is a 34 year old  who presents today for evaluation of ***.     # ***  -     *** minutes was spent reviewing the electronic medical record, face to face time with [unfilled], counseling and coordinating care with the patient, family and/or caregivers and post visit documentation.     Olga Vital MD     second bleeding episode started. Document if you have a positive OPK so we know what day you had your positive on (relative to bleeding episodes)  - Would plan to do letrozole 5 mg with the cycle after that. Would plan to do a mid cycle ultrasound to confirm timing and number of follicles  - With the cycle after that, would plan to move forward with IUI  - She will reach out with a cycle update on CD#1 and we will plan to review all her information at a clinic visit in 4 weeks     Olga Vital MD

## 2025-05-02 NOTE — NURSING NOTE
"Chief Complaint   Patient presents with    Fertility   Patient is here for fertility consult. Patient have been trying x 3 years.     Initial /70   Pulse 91   Wt 67.4 kg (148 lb 8 oz)   LMP 04/07/2025 (Exact Date)   BMI 24.71 kg/m   Estimated body mass index is 24.71 kg/m  as calculated from the following:    Height as of 4/11/25: 1.651 m (5' 5\").    Weight as of this encounter: 67.4 kg (148 lb 8 oz).  Medication Reconciliation: complete      Karlene Davis LPN    "

## 2025-05-02 NOTE — PATIENT INSTRUCTIONS
- We will consider the first day that you have bleeding CD#1 (for now)  - Start doing OPK on CD#10 and do every morning until 20 days after your second bleeding episode started. Document if you have a positive OPK so we know what day you had your positive on  - Would plan to do letrozole 5 mg with the cycle after that. Would plan to do a mid cycle ultrasound to confirm timing and number of follicles  - With the cycle after that, would plan to move forward with IUI

## 2025-05-15 DIAGNOSIS — F90.2 ATTENTION DEFICIT HYPERACTIVITY DISORDER (ADHD), COMBINED TYPE: ICD-10-CM

## 2025-05-19 RX ORDER — METHYLPHENIDATE HYDROCHLORIDE 20 MG/1
CAPSULE, EXTENDED RELEASE ORAL
Qty: 30 CAPSULE | Refills: 0 | OUTPATIENT
Start: 2025-05-19

## 2025-05-19 NOTE — TELEPHONE ENCOUNTER
[id number] sent Rx request for the following:      Requested Prescriptions   Pending Prescriptions Disp Refills    methylphenidate (RITALIN LA) 20 MG 24 hr capsule [Pharmacy Med Name: METHYLPHENIDATE LA 20MG ER CAP] 30 capsule 0     Si25 TAKE 1 CAPSULE BY MOUTH DAILY       Rx Protocol Controlled Substance Failed - 2025  1:45 PM        Failed - Medication is active on med list and the sig matches        Failed - Urine drug screeen results on file in past 12 months     [unfilled]           Failed - Controlled Substance Agreement on file in last 12 months     Please review last Controlled Substance Pain agreement document.   CSA -- Encounter Level:    CSA: None found at the encounter level.       CSA -- Patient Level:    CSA: None found at the patient level.             Failed - Auto Fail - Please forward to Provider     Last Prescription Date:   25  Last Fill Qty/Refills:         30, R-0 (End: 25)    Last Office Visit:              25 (Px)   Future Office visit:           25    Per LOV note:    Return in about 53 weeks (around 3/5/2026) for Annual Wellness Visit.    If taking as directed, Pt shouldn't run out until 25. Pt has appointment 25. Pt can discuss refills at upcoming appointment. Pharmacy notified. Melanie Gasca Refdorian KHAN .............. 2025  1:46 PM

## 2025-05-23 ENCOUNTER — OFFICE VISIT (OUTPATIENT)
Dept: FAMILY MEDICINE | Facility: OTHER | Age: 35
End: 2025-05-23
Attending: FAMILY MEDICINE
Payer: COMMERCIAL

## 2025-05-23 VITALS
WEIGHT: 148.2 LBS | OXYGEN SATURATION: 97 % | SYSTOLIC BLOOD PRESSURE: 130 MMHG | RESPIRATION RATE: 16 BRPM | TEMPERATURE: 98.1 F | HEART RATE: 81 BPM | DIASTOLIC BLOOD PRESSURE: 82 MMHG | BODY MASS INDEX: 24.66 KG/M2

## 2025-05-23 DIAGNOSIS — F90.2 ATTENTION DEFICIT HYPERACTIVITY DISORDER (ADHD), COMBINED TYPE: Primary | ICD-10-CM

## 2025-05-23 PROCEDURE — 3079F DIAST BP 80-89 MM HG: CPT | Performed by: FAMILY MEDICINE

## 2025-05-23 PROCEDURE — 1126F AMNT PAIN NOTED NONE PRSNT: CPT | Performed by: FAMILY MEDICINE

## 2025-05-23 PROCEDURE — 99213 OFFICE O/P EST LOW 20 MIN: CPT | Performed by: FAMILY MEDICINE

## 2025-05-23 PROCEDURE — 3075F SYST BP GE 130 - 139MM HG: CPT | Performed by: FAMILY MEDICINE

## 2025-05-23 RX ORDER — METHYLPHENIDATE HYDROCHLORIDE 20 MG/1
20 CAPSULE, EXTENDED RELEASE ORAL DAILY
Qty: 30 CAPSULE | Refills: 0 | Status: SHIPPED | OUTPATIENT
Start: 2025-06-22 | End: 2025-07-22

## 2025-05-23 RX ORDER — METHYLPHENIDATE HYDROCHLORIDE 20 MG/1
20 CAPSULE, EXTENDED RELEASE ORAL DAILY
Qty: 30 CAPSULE | Refills: 0 | Status: SHIPPED | OUTPATIENT
Start: 2025-07-22 | End: 2025-08-21

## 2025-05-23 RX ORDER — METHYLPHENIDATE HYDROCHLORIDE 20 MG/1
CAPSULE, EXTENDED RELEASE ORAL
Qty: 30 CAPSULE | Refills: 0 | Status: CANCELLED | OUTPATIENT
Start: 2025-05-23

## 2025-05-23 RX ORDER — METHYLPHENIDATE HYDROCHLORIDE 20 MG/1
20 CAPSULE, EXTENDED RELEASE ORAL DAILY
Qty: 30 CAPSULE | Refills: 0 | Status: SHIPPED | OUTPATIENT
Start: 2025-05-23 | End: 2025-06-22

## 2025-05-23 RX ORDER — METHYLPHENIDATE HYDROCHLORIDE 20 MG/1
20 CAPSULE, EXTENDED RELEASE ORAL DAILY
Qty: 30 CAPSULE | Refills: 0 | Status: CANCELLED | OUTPATIENT
Start: 2025-05-23

## 2025-05-23 ASSESSMENT — PAIN SCALES - GENERAL: PAINLEVEL_OUTOF10: NO PAIN (0)

## 2025-05-23 NOTE — NURSING NOTE
"Chief Complaint   Patient presents with    Recheck Medication       Initial /82   Pulse 81   Temp 98.1  F (36.7  C) (Temporal)   Resp 16   Wt 67.2 kg (148 lb 3.2 oz)   LMP 05/16/2025 (Exact Date)   SpO2 97%   BMI 24.66 kg/m   Estimated body mass index is 24.66 kg/m  as calculated from the following:    Height as of 4/11/25: 1.651 m (5' 5\").    Weight as of this encounter: 67.2 kg (148 lb 3.2 oz).  Medication Reconciliation: complete      Olga Brito LPN      " Carac Counseling:  I discussed with the patient the risks of Carac including but not limited to erythema, scaling, itching, weeping, crusting, and pain.

## 2025-05-23 NOTE — PROGRESS NOTES
"  Assessment & Plan     Attention deficit hyperactivity disorder (ADHD), combined type  At current dose of 20mg methylphenidate per day, has noticed improvement in her focus and motivation. Has increased ability to keep on her schedule and take meds as prescribed. No adverse side effects; no weight loss, heart palpitations, or insomnia. Plan to continue current dose and regimen.   - methylphenidate (RITALIN LA) 20 MG 24 hr capsule, methylphenidate (RITALIN LA) 20 MG 24 hr capsule, methylphenidate (RITALIN LA) 20 MG 24 hr capsule    Temo Sher is a 34 year old, presenting for the following health issues:  Recheck Medication      5/23/2025     4:18 PM   Additional Questions   Roomed by ODIN Espinoza   Accompanied by Self         5/23/2025     4:18 PM   Patient Reported Additional Medications   Patient reports taking the following new medications N/A     History of Present Illness       Reason for visit:  Adhd medication    She eats 0-1 servings of fruits and vegetables daily.She consumes 2 sweetened beverage(s) daily.She exercises with enough effort to increase her heart rate 10 to 19 minutes per day.  She exercises with enough effort to increase her heart rate 3 or less days per week. She is missing 2 dose(s) of medications per week.  She is not taking prescribed medications regularly due to remembering to take.      Restarted ridalin after 2 years without. Had been on it for 1 year prior. Most improvement in motivation, \"adulting things\" such as chores. Has been better about taking pills as prescribed. No insomnia or heart palpitations. No weight loss. Does feel that it is easier to follow her ovulation tracking since restarting it.         Objective    /82   Pulse 81   Temp 98.1  F (36.7  C) (Temporal)   Resp 16   Wt 67.2 kg (148 lb 3.2 oz)   LMP 05/16/2025 (Exact Date)   SpO2 97%   BMI 24.66 kg/m    Body mass index is 24.66 kg/m .  Physical Exam  Constitutional:       General: She is not in " acute distress.     Appearance: She is not ill-appearing.   HENT:      Head: Normocephalic and atraumatic.   Cardiovascular:      Rate and Rhythm: Normal rate and regular rhythm.      Heart sounds: No murmur heard.     No friction rub. No gallop.   Pulmonary:      Effort: No respiratory distress.      Breath sounds: No stridor. No wheezing, rhonchi or rales.   Neurological:      General: No focal deficit present.      Mental Status: She is alert and oriented to person, place, and time.   Psychiatric:         Mood and Affect: Mood normal.         Behavior: Behavior normal.          Jojo Guy MS4  [facility] Medical Student    Pt was also seen and examined by me.    Signed Electronically by: Chance Daly MD

## 2025-06-18 DIAGNOSIS — F90.2 ATTENTION DEFICIT HYPERACTIVITY DISORDER (ADHD), COMBINED TYPE: ICD-10-CM

## 2025-06-19 RX ORDER — METHYLPHENIDATE HYDROCHLORIDE 20 MG/1
20 CAPSULE, EXTENDED RELEASE ORAL DAILY
Qty: 30 CAPSULE | Refills: 0 | OUTPATIENT
Start: 2025-06-19

## 2025-06-19 NOTE — TELEPHONE ENCOUNTER
[id number] sent Rx request for the following:      Requested Prescriptions   Pending Prescriptions Disp Refills    methylphenidate (RITALIN LA) 20 MG 24 hr capsule 30 capsule 0     Sig: Take 1 capsule (20 mg) by mouth daily.       Rx Protocol Controlled Substance Failed - 6/19/2025  6:16 AM        Failed - Urine drug screeen results on file in past 12 months     [unfilled]           Failed - Controlled Substance Agreement on file in last 12 months     Please review last Controlled Substance Pain agreement document.   CSA -- Encounter Level:    CSA: None found at the encounter level.       CSA -- Patient Level:    CSA: None found at the patient level.               Failed - Auto Fail - Please forward to Provider     Attention deficit hyperactivity disorder (ADHD), combined type [F90.2]  - Primary     Last Prescription Date:   5/23/25-6/22/25  Last Fill Qty/Refills:         30, R-0    Last Office Visit:              5/23/25   Future Office visit:             Next 5 appointments (look out 90 days)      Jun 23, 2025 11:00 AM  Nurse Only with  Ob Nurse  [facility] ([facility]) [address]  [place] [address]  [phone]     Aug 13, 2025 8:30 AM  (Arrive by 8:10 AM)  Provider Visit with Chance Daly MD  [facility] ([facility]) [address]  [place] [address]  [phone]           Unable to complete prescription refill per RN Medication Refill Policy.     Noé Schofield RN on 6/19/2025 at 6:16 AM

## 2025-06-23 ENCOUNTER — ALLIED HEALTH/NURSE VISIT (OUTPATIENT)
Dept: OBGYN | Facility: OTHER | Age: 35
End: 2025-06-23
Attending: OBSTETRICS & GYNECOLOGY
Payer: COMMERCIAL

## 2025-06-23 ENCOUNTER — HOSPITAL ENCOUNTER (OUTPATIENT)
Dept: ULTRASOUND IMAGING | Facility: OTHER | Age: 35
Discharge: HOME | End: 2025-06-23
Attending: OBSTETRICS & GYNECOLOGY
Payer: COMMERCIAL

## 2025-06-23 DIAGNOSIS — N97.9 FEMALE INFERTILITY: ICD-10-CM

## 2025-06-23 DIAGNOSIS — N97.9 FEMALE INFERTILITY: Primary | ICD-10-CM

## 2025-06-23 PROCEDURE — 76857 US EXAM PELVIC LIMITED: CPT

## 2025-06-23 PROCEDURE — 76857 US EXAM PELVIC LIMITED: CPT | Mod: 26 | Performed by: RADIOLOGY

## 2025-06-23 RX ORDER — CHORIOGONADOTROPIN ALFA 250 UG/.5ML
250 INJECTION, SOLUTION SUBCUTANEOUS ONCE
Qty: 0.5 ML | Refills: 0 | Status: CANCELLED | OUTPATIENT
Start: 2025-06-23 | End: 2025-06-23

## 2025-06-23 NOTE — NURSING NOTE
Patient here for follicle study.     Plan for this cycle:  Pt presents to clinic for follicle study. Ultrasound review by Dr. Vital regarding cycle. Dr. Vital was able to consult patient today and advised the following plan: To reduce Letrazole to 2.5mg and to message us with day 1 of cycle.    Liliya Tony RN

## 2025-06-26 ENCOUNTER — RESULTS FOLLOW-UP (OUTPATIENT)
Dept: OBGYN | Facility: CLINIC | Age: 35
End: 2025-06-26

## 2025-07-21 ENCOUNTER — HOSPITAL ENCOUNTER (OUTPATIENT)
Dept: ULTRASOUND IMAGING | Facility: OTHER | Age: 35
Discharge: HOME | End: 2025-07-21
Attending: OBSTETRICS & GYNECOLOGY
Payer: COMMERCIAL

## 2025-07-21 ENCOUNTER — OFFICE VISIT (OUTPATIENT)
Dept: OBGYN | Facility: OTHER | Age: 35
End: 2025-07-21
Attending: OBSTETRICS & GYNECOLOGY
Payer: COMMERCIAL

## 2025-07-21 VITALS
HEART RATE: 100 BPM | SYSTOLIC BLOOD PRESSURE: 120 MMHG | WEIGHT: 145.7 LBS | BODY MASS INDEX: 24.28 KG/M2 | HEIGHT: 65 IN | RESPIRATION RATE: 18 BRPM | DIASTOLIC BLOOD PRESSURE: 60 MMHG | OXYGEN SATURATION: 97 %

## 2025-07-21 DIAGNOSIS — N97.9 FEMALE INFERTILITY: ICD-10-CM

## 2025-07-21 DIAGNOSIS — N97.9 FEMALE INFERTILITY: Primary | ICD-10-CM

## 2025-07-21 PROCEDURE — 76857 US EXAM PELVIC LIMITED: CPT | Mod: 26 | Performed by: RADIOLOGY

## 2025-07-21 PROCEDURE — 76857 US EXAM PELVIC LIMITED: CPT

## 2025-07-21 NOTE — NURSING NOTE
"Chief Complaint   Patient presents with    Follow Up     Follicle study results       Initial /60   Pulse 100   Resp 18   Ht 1.651 m (5' 5\")   Wt 66.1 kg (145 lb 11.2 oz)   LMP 07/10/2025   SpO2 97%   BMI 24.25 kg/m   Estimated body mass index is 24.25 kg/m  as calculated from the following:    Height as of this encounter: 1.651 m (5' 5\").    Weight as of this encounter: 66.1 kg (145 lb 11.2 oz).  Medication Reconciliation: complete    Geeta Chun LPN    "

## 2025-07-21 NOTE — PROGRESS NOTES
"Follow-Up Fertility Visit    S: Ms. Nikky Hurt is a 34 year old  here for ultrasound follow-up related to fertility.  She had an ultrasound today which showed 2 follicle/s at or above 16 mm.  Uterine lining today is 99 millimeters.  Her fertility plan is ovulation induction with TI.     O:  /60   Pulse 100   Resp 18   Ht 1.651 m (5' 5\")   Wt 66.1 kg (145 lb 11.2 oz)   LMP 07/10/2025   SpO2 97%   BMI 24.25 kg/m    Gen: Well-appearing, NAD  Pulm: nonlabored    A/P:  Ms. Nikky Hurt is a 34 year old  here for follow-up follow-up.  Ultrasound reviewed today.  Today she is to trigger with Ovidrel.  Will plan to have patient complete Timed intercourse.  Instruction provided.     MIGUEL ANGEL Connolly-C  2025 10:09 AM      "

## 2025-08-13 ENCOUNTER — VIRTUAL VISIT (OUTPATIENT)
Dept: FAMILY MEDICINE | Facility: OTHER | Age: 35
End: 2025-08-13
Attending: FAMILY MEDICINE
Payer: COMMERCIAL

## 2025-08-13 DIAGNOSIS — F90.2 ATTENTION DEFICIT HYPERACTIVITY DISORDER (ADHD), COMBINED TYPE: Primary | ICD-10-CM

## 2025-08-13 RX ORDER — METHYLPHENIDATE HYDROCHLORIDE 20 MG/1
20 CAPSULE, EXTENDED RELEASE ORAL DAILY
Qty: 30 CAPSULE | Refills: 0 | Status: SHIPPED | OUTPATIENT
Start: 2025-10-12 | End: 2025-11-11

## 2025-08-13 RX ORDER — METHYLPHENIDATE HYDROCHLORIDE 20 MG/1
20 CAPSULE, EXTENDED RELEASE ORAL DAILY
Qty: 30 CAPSULE | Refills: 0 | Status: SHIPPED | OUTPATIENT
Start: 2025-09-12 | End: 2025-10-12

## 2025-08-13 RX ORDER — METHYLPHENIDATE HYDROCHLORIDE 20 MG/1
20 CAPSULE, EXTENDED RELEASE ORAL DAILY
Qty: 30 CAPSULE | Refills: 0 | Status: SHIPPED | OUTPATIENT
Start: 2025-08-13 | End: 2025-09-12

## (undated) RX ORDER — DEXAMETHASONE SODIUM PHOSPHATE 4 MG/ML
INJECTION, SOLUTION INTRA-ARTICULAR; INTRALESIONAL; INTRAMUSCULAR; INTRAVENOUS; SOFT TISSUE
Status: DISPENSED
Start: 2024-01-02